# Patient Record
Sex: FEMALE | Race: BLACK OR AFRICAN AMERICAN | NOT HISPANIC OR LATINO
[De-identification: names, ages, dates, MRNs, and addresses within clinical notes are randomized per-mention and may not be internally consistent; named-entity substitution may affect disease eponyms.]

---

## 2017-02-28 ENCOUNTER — APPOINTMENT (OUTPATIENT)
Dept: CARDIOLOGY | Facility: CLINIC | Age: 72
End: 2017-02-28

## 2017-02-28 ENCOUNTER — NON-APPOINTMENT (OUTPATIENT)
Age: 72
End: 2017-02-28

## 2017-02-28 VITALS
SYSTOLIC BLOOD PRESSURE: 128 MMHG | DIASTOLIC BLOOD PRESSURE: 80 MMHG | BODY MASS INDEX: 34.23 KG/M2 | HEIGHT: 62 IN | HEART RATE: 64 BPM | WEIGHT: 186 LBS

## 2017-06-29 ENCOUNTER — APPOINTMENT (OUTPATIENT)
Dept: CARDIOLOGY | Facility: CLINIC | Age: 72
End: 2017-06-29
Payer: MEDICARE

## 2017-06-29 ENCOUNTER — NON-APPOINTMENT (OUTPATIENT)
Age: 72
End: 2017-06-29

## 2017-06-29 VITALS
BODY MASS INDEX: 34.57 KG/M2 | DIASTOLIC BLOOD PRESSURE: 80 MMHG | HEART RATE: 73 BPM | OXYGEN SATURATION: 99 % | WEIGHT: 189 LBS | SYSTOLIC BLOOD PRESSURE: 122 MMHG

## 2017-06-29 PROCEDURE — 93000 ELECTROCARDIOGRAM COMPLETE: CPT

## 2017-06-29 PROCEDURE — 99214 OFFICE O/P EST MOD 30 MIN: CPT

## 2017-06-29 PROCEDURE — 93306 TTE W/DOPPLER COMPLETE: CPT

## 2017-10-19 ENCOUNTER — OUTPATIENT (OUTPATIENT)
Dept: OUTPATIENT SERVICES | Facility: HOSPITAL | Age: 72
LOS: 1 days | End: 2017-10-19
Payer: COMMERCIAL

## 2017-10-19 VITALS
HEIGHT: 62 IN | RESPIRATION RATE: 17 BRPM | OXYGEN SATURATION: 98 % | DIASTOLIC BLOOD PRESSURE: 80 MMHG | WEIGHT: 186.07 LBS | TEMPERATURE: 98 F | SYSTOLIC BLOOD PRESSURE: 144 MMHG | HEART RATE: 80 BPM

## 2017-10-19 DIAGNOSIS — I10 ESSENTIAL (PRIMARY) HYPERTENSION: ICD-10-CM

## 2017-10-19 DIAGNOSIS — Z01.818 ENCOUNTER FOR OTHER PREPROCEDURAL EXAMINATION: ICD-10-CM

## 2017-10-19 DIAGNOSIS — E11.9 TYPE 2 DIABETES MELLITUS WITHOUT COMPLICATIONS: ICD-10-CM

## 2017-10-19 DIAGNOSIS — M75.122 COMPLETE ROTATOR CUFF TEAR OR RUPTURE OF LEFT SHOULDER, NOT SPECIFIED AS TRAUMATIC: ICD-10-CM

## 2017-10-19 DIAGNOSIS — M75.102 UNSPECIFIED ROTATOR CUFF TEAR OR RUPTURE OF LEFT SHOULDER, NOT SPECIFIED AS TRAUMATIC: ICD-10-CM

## 2017-10-19 DIAGNOSIS — Z90.710 ACQUIRED ABSENCE OF BOTH CERVIX AND UTERUS: Chronic | ICD-10-CM

## 2017-10-19 PROCEDURE — G0463: CPT

## 2017-10-19 RX ORDER — SODIUM CHLORIDE 9 MG/ML
3 INJECTION INTRAMUSCULAR; INTRAVENOUS; SUBCUTANEOUS EVERY 8 HOURS
Qty: 0 | Refills: 0 | Status: DISCONTINUED | OUTPATIENT
Start: 2017-10-31 | End: 2017-11-10

## 2017-10-19 NOTE — H&P PST ADULT - HISTORY OF PRESENT ILLNESS
72 yr old black female with history of HTN, DM, HLD, osteoporosis presents with complete tear of left rotator cuff. Pt states about 4 months ago she was unable to extend her left arm up and had pain. She received injection into the shoulder with some relief and than started with physical therapy but not too much improvement. Presents here for left shoulder arthroscopy and rotator cuff repair

## 2017-10-19 NOTE — H&P PST ADULT - NSANTHOSAYNRD_GEN_A_CORE
No. INDER screening performed.  STOP BANG Legend: 0-2 = LOW Risk; 3-4 = INTERMEDIATE Risk; 5-8 = HIGH Risk

## 2017-10-19 NOTE — H&P PST ADULT - PMH
DM (Diabetes Mellitus)    Generalized Osteoarthritis    Hypercholesteremia    Hypertension    Rotator cuff tear, non-traumatic, left

## 2017-10-19 NOTE — H&P PST ADULT - FAMILY HISTORY
Father  Still living? Unknown  History of hypertension, Age at diagnosis: Age Unknown  DM (diabetes mellitus), Age at diagnosis: Age Unknown     Mother  Still living? No  History of hypertension, Age at diagnosis: Age Unknown  DM (diabetes mellitus), Age at diagnosis: Age Unknown

## 2017-10-24 ENCOUNTER — APPOINTMENT (OUTPATIENT)
Dept: CARDIOLOGY | Facility: CLINIC | Age: 72
End: 2017-10-24
Payer: MEDICARE

## 2017-10-24 VITALS
HEIGHT: 62 IN | HEART RATE: 70 BPM | BODY MASS INDEX: 34.78 KG/M2 | WEIGHT: 189 LBS | SYSTOLIC BLOOD PRESSURE: 120 MMHG | DIASTOLIC BLOOD PRESSURE: 70 MMHG

## 2017-10-24 PROCEDURE — 99214 OFFICE O/P EST MOD 30 MIN: CPT

## 2017-10-31 ENCOUNTER — RESULT REVIEW (OUTPATIENT)
Age: 72
End: 2017-10-31

## 2017-10-31 ENCOUNTER — TRANSCRIPTION ENCOUNTER (OUTPATIENT)
Age: 72
End: 2017-10-31

## 2017-10-31 ENCOUNTER — OUTPATIENT (OUTPATIENT)
Dept: OUTPATIENT SERVICES | Facility: HOSPITAL | Age: 72
LOS: 1 days | Discharge: HOME | End: 2017-10-31
Payer: COMMERCIAL

## 2017-10-31 VITALS
SYSTOLIC BLOOD PRESSURE: 115 MMHG | TEMPERATURE: 98 F | RESPIRATION RATE: 16 BRPM | DIASTOLIC BLOOD PRESSURE: 57 MMHG | OXYGEN SATURATION: 100 % | HEART RATE: 74 BPM

## 2017-10-31 VITALS
TEMPERATURE: 98 F | HEART RATE: 65 BPM | RESPIRATION RATE: 14 BRPM | OXYGEN SATURATION: 100 % | DIASTOLIC BLOOD PRESSURE: 65 MMHG | WEIGHT: 186.07 LBS | SYSTOLIC BLOOD PRESSURE: 133 MMHG | HEIGHT: 62 IN

## 2017-10-31 DIAGNOSIS — Z90.710 ACQUIRED ABSENCE OF BOTH CERVIX AND UTERUS: Chronic | ICD-10-CM

## 2017-10-31 DIAGNOSIS — M75.122 COMPLETE ROTATOR CUFF TEAR OR RUPTURE OF LEFT SHOULDER, NOT SPECIFIED AS TRAUMATIC: ICD-10-CM

## 2017-10-31 LAB
GLUCOSE BLDC GLUCOMTR-MCNC: 139 MG/DL — HIGH (ref 70–99)
GLUCOSE BLDC GLUCOMTR-MCNC: 227 MG/DL — HIGH (ref 70–99)

## 2017-10-31 PROCEDURE — 29828 SHO ARTHRS SRG BICP TENODSIS: CPT | Mod: LT

## 2017-10-31 PROCEDURE — 29826 SHO ARTHRS SRG DECOMPRESSION: CPT | Mod: AS,59

## 2017-10-31 PROCEDURE — 88304 TISSUE EXAM BY PATHOLOGIST: CPT

## 2017-10-31 PROCEDURE — 29807 SHO ARTHRS SRG RPR SLAP LES: CPT | Mod: LT

## 2017-10-31 PROCEDURE — 29826 SHO ARTHRS SRG DECOMPRESSION: CPT | Mod: LT

## 2017-10-31 PROCEDURE — 23440 REMOVE/TRANSPLANT TENDON: CPT | Mod: AS,59

## 2017-10-31 PROCEDURE — 82962 GLUCOSE BLOOD TEST: CPT

## 2017-10-31 PROCEDURE — 29827 SHO ARTHRS SRG RT8TR CUF RPR: CPT | Mod: AS,LT

## 2017-10-31 PROCEDURE — 29824 SHO ARTHRS SRG DSTL CLAVICLC: CPT | Mod: AS,59

## 2017-10-31 PROCEDURE — 29823 SHO ARTHRS SRG XTNSV DBRDMT: CPT | Mod: AS,59

## 2017-10-31 PROCEDURE — 29824 SHO ARTHRS SRG DSTL CLAVICLC: CPT | Mod: LT

## 2017-10-31 PROCEDURE — 29827 SHO ARTHRS SRG RT8TR CUF RPR: CPT | Mod: LT

## 2017-10-31 PROCEDURE — C1713: CPT

## 2017-10-31 PROCEDURE — 88304 TISSUE EXAM BY PATHOLOGIST: CPT | Mod: 26

## 2017-10-31 RX ORDER — ONDANSETRON 8 MG/1
4 TABLET, FILM COATED ORAL EVERY 6 HOURS
Qty: 0 | Refills: 0 | Status: DISCONTINUED | OUTPATIENT
Start: 2017-10-31 | End: 2017-11-10

## 2017-10-31 RX ORDER — OXYCODONE AND ACETAMINOPHEN 5; 325 MG/1; MG/1
2 TABLET ORAL EVERY 6 HOURS
Qty: 0 | Refills: 0 | Status: DISCONTINUED | OUTPATIENT
Start: 2017-10-31 | End: 2017-10-31

## 2017-10-31 RX ORDER — OXYCODONE AND ACETAMINOPHEN 5; 325 MG/1; MG/1
1 TABLET ORAL EVERY 4 HOURS
Qty: 0 | Refills: 0 | Status: DISCONTINUED | OUTPATIENT
Start: 2017-10-31 | End: 2017-10-31

## 2017-10-31 RX ORDER — SODIUM CHLORIDE 9 MG/ML
1000 INJECTION, SOLUTION INTRAVENOUS
Qty: 0 | Refills: 0 | Status: DISCONTINUED | OUTPATIENT
Start: 2017-10-31 | End: 2017-11-10

## 2017-10-31 RX ORDER — HYDROMORPHONE HYDROCHLORIDE 2 MG/ML
0.3 INJECTION INTRAMUSCULAR; INTRAVENOUS; SUBCUTANEOUS
Qty: 0 | Refills: 0 | Status: DISCONTINUED | OUTPATIENT
Start: 2017-10-31 | End: 2017-10-31

## 2017-10-31 RX ADMIN — SODIUM CHLORIDE 3 MILLILITER(S): 9 INJECTION INTRAMUSCULAR; INTRAVENOUS; SUBCUTANEOUS at 07:29

## 2017-10-31 NOTE — ASU DISCHARGE PLAN (ADULT/PEDIATRIC). - NOTIFY
Unable to Urinate/Persistent Nausea and Vomiting/Pain not relieved by Medications/Swelling that continues/Bleeding that does not stop/Fever greater than 101/Numbness, tingling/Numbness, color, or temperature change to extremity

## 2017-10-31 NOTE — ASU DISCHARGE PLAN (ADULT/PEDIATRIC). - YOU WERE IN THE HOSPITAL FOR:
s/p left shoulder arthroscopy with PRP injection s/p left shoulder arthroscopy Rotator cuff repair, with PRP injection

## 2017-10-31 NOTE — BRIEF OPERATIVE NOTE - OPERATION/FINDINGS
PROCEDURE:  LEFT SHOULDER ARTHROSCOPIC RTC REPAIR, DEBRIDEMENT OF LABRUM,  SYNOVECTOMY, CHONDROPLASTY, SUBACROMIAL DECOMPRESSION, ACROMIOPLASTY  DISTAL CLAVICLE RESECTION, WITH INJECTION OF AUTOLOGOUS PRP INJECTION

## 2017-10-31 NOTE — ASU PREOP CHECKLIST - HEIGHT IN FEET
Spinal Block    Patient location during procedure: pre-op  Start Time: 4/14/2017 9:54 AM  Preanesthetic Checklist  Completed: patient identified, surgical consent, pre-op evaluation, timeout performed, IV checked, risks and benefits discussed and monitors and equipment checked  Spinal Block Prep:  Patient Position:left lateral decubitus  Sterile Tech:cap, gloves, mask and sterile barriers  Prep:Betadine  Patient Monitoring:blood pressure monitoring, continuous pulse oximetry and EKG  Spinal Block Procedure  Approach:midline  Guidance:landmark technique  Location:L2-L3  Needle Type:Pencan  Needle Gauge:27 G  Placement of Spinal needle event:cerebrospinal fluid aspirated  Paresthesia: no  Fluid Appearance:clear  Post Assessment  Patient Tolerance:patient tolerated the procedure well with no apparent complications  Complications no            
5

## 2017-10-31 NOTE — ASU DISCHARGE PLAN (ADULT/PEDIATRIC). - SPECIAL INSTRUCTIONS
Keep wound/bandage clean, dry and intact. Return to ER if Fever of 101 F or greater develops Keep wound/bandage clean, dry and intact. Return to ER if Fever of 101 F or greater develops    Please use ice packs for 30 minutes on then 30 minutes off as much as possible until post-operative appointment. Make sure to protect your skin by placing a towel between the ice pack and your skin.

## 2017-10-31 NOTE — ASU DISCHARGE PLAN (ADULT/PEDIATRIC). - MEDICATION SUMMARY - MEDICATIONS TO TAKE
I will START or STAY ON the medications listed below when I get home from the hospital:    Percocet 5/325 oral tablet  -- 1-2  tab(s) by mouth every 6 hours, As Needed -for moderate pain MDD:6   -- Caution federal law prohibits the transfer of this drug to any person other  than the person for whom it was prescribed.  May cause drowsiness.  Alcohol may intensify this effect.  Use care when operating dangerous machinery.  This prescription cannot be refilled.  This product contains acetaminophen.  Do not use  with any other product containing acetaminophen to prevent possible liver damage.  Using more of this medication than prescribed may cause serious breathing problems.    -- Indication: For pain    metFORMIN 1000 mg oral tablet  -- 1 tab(s) by mouth 2 times a day  -- Indication: For dm    Lantus 100 units/mL subcutaneous solution  -- 20 unit(s) subcutaneous once (at bedtime)  -- Indication: For dm    NovoLOG 100 units/mL subcutaneous solution  -- 15  subcutaneous 2 times a day  -- Indication: For dm    pravastatin 20 mg oral tablet  -- 1 tab(s) by mouth once a day  -- Indication: For hld    valsartan-hydrochlorothiazide 320mg-25mg oral tablet  -- 1 tab(s) by mouth once a day  -- Indication: For htn    raloxifene 60 mg oral tablet  -- 1 tab(s) by mouth once a day  -- Indication: For as per md    atenolol 50 mg oral tablet  -- 1 tab(s) by mouth once a day  -- Indication: For htn    timolol hemihydrate 0.5% ophthalmic solution  -- 1  to each affected eye once a day  -- Indication: For as per md

## 2017-10-31 NOTE — BRIEF OPERATIVE NOTE - PROCEDURE
<<-----Click on this checkbox to enter Procedure Rotator cuff repair  10/31/2017    Active  LILIANA  Shoulder arthroscopy  10/31/2017    Active  LILIANA

## 2017-11-03 LAB — SURGICAL PATHOLOGY FINAL REPORT - CH: SIGNIFICANT CHANGE UP

## 2017-11-08 DIAGNOSIS — S43.432A SUPERIOR GLENOID LABRUM LESION OF LEFT SHOULDER, INITIAL ENCOUNTER: ICD-10-CM
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
## 2017-11-08 DIAGNOSIS — M24.012 LOOSE BODY IN LEFT SHOULDER: ICD-10-CM
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
## 2017-11-08 DIAGNOSIS — M75.42 IMPINGEMENT SYNDROME OF LEFT SHOULDER: ICD-10-CM

## 2017-11-08 DIAGNOSIS — M75.102 UNSPECIFIED ROTATOR CUFF TEAR OR RUPTURE OF LEFT SHOULDER, NOT SPECIFIED AS TRAUMATIC: ICD-10-CM
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
## 2018-01-23 ENCOUNTER — APPOINTMENT (OUTPATIENT)
Dept: CARDIOLOGY | Facility: CLINIC | Age: 73
End: 2018-01-23
Payer: MEDICARE

## 2018-01-23 ENCOUNTER — NON-APPOINTMENT (OUTPATIENT)
Age: 73
End: 2018-01-23

## 2018-01-23 VITALS
OXYGEN SATURATION: 98 % | BODY MASS INDEX: 34.6 KG/M2 | HEART RATE: 82 BPM | SYSTOLIC BLOOD PRESSURE: 136 MMHG | DIASTOLIC BLOOD PRESSURE: 78 MMHG | WEIGHT: 188 LBS | HEIGHT: 62 IN | RESPIRATION RATE: 16 BRPM

## 2018-01-23 PROCEDURE — 99214 OFFICE O/P EST MOD 30 MIN: CPT

## 2018-01-23 PROCEDURE — 93000 ELECTROCARDIOGRAM COMPLETE: CPT

## 2018-04-18 ENCOUNTER — APPOINTMENT (OUTPATIENT)
Dept: CARDIOLOGY | Facility: CLINIC | Age: 73
End: 2018-04-18
Payer: MEDICARE

## 2018-04-18 VITALS
HEIGHT: 62 IN | DIASTOLIC BLOOD PRESSURE: 60 MMHG | BODY MASS INDEX: 34.41 KG/M2 | HEART RATE: 73 BPM | SYSTOLIC BLOOD PRESSURE: 102 MMHG | WEIGHT: 187 LBS | OXYGEN SATURATION: 98 %

## 2018-04-18 PROCEDURE — 99214 OFFICE O/P EST MOD 30 MIN: CPT

## 2018-06-05 ENCOUNTER — APPOINTMENT (OUTPATIENT)
Dept: CARDIOLOGY | Facility: CLINIC | Age: 73
End: 2018-06-05
Payer: MEDICARE

## 2018-06-05 ENCOUNTER — NON-APPOINTMENT (OUTPATIENT)
Age: 73
End: 2018-06-05

## 2018-06-05 VITALS
HEIGHT: 62 IN | HEART RATE: 81 BPM | BODY MASS INDEX: 34.96 KG/M2 | WEIGHT: 190 LBS | OXYGEN SATURATION: 99 % | DIASTOLIC BLOOD PRESSURE: 68 MMHG | SYSTOLIC BLOOD PRESSURE: 110 MMHG

## 2018-06-05 PROCEDURE — 93000 ELECTROCARDIOGRAM COMPLETE: CPT

## 2018-06-05 PROCEDURE — 99214 OFFICE O/P EST MOD 30 MIN: CPT

## 2018-06-21 ENCOUNTER — OUTPATIENT (OUTPATIENT)
Dept: OUTPATIENT SERVICES | Facility: HOSPITAL | Age: 73
LOS: 1 days | End: 2018-06-21
Payer: COMMERCIAL

## 2018-06-21 VITALS
TEMPERATURE: 98 F | WEIGHT: 190.04 LBS | DIASTOLIC BLOOD PRESSURE: 60 MMHG | RESPIRATION RATE: 18 BRPM | HEART RATE: 64 BPM | SYSTOLIC BLOOD PRESSURE: 120 MMHG | HEIGHT: 59 IN

## 2018-06-21 DIAGNOSIS — M75.122 COMPLETE ROTATOR CUFF TEAR OR RUPTURE OF LEFT SHOULDER, NOT SPECIFIED AS TRAUMATIC: ICD-10-CM

## 2018-06-21 DIAGNOSIS — Z01.818 ENCOUNTER FOR OTHER PREPROCEDURAL EXAMINATION: ICD-10-CM

## 2018-06-21 DIAGNOSIS — Z98.890 OTHER SPECIFIED POSTPROCEDURAL STATES: Chronic | ICD-10-CM

## 2018-06-21 DIAGNOSIS — M75.102 UNSPECIFIED ROTATOR CUFF TEAR OR RUPTURE OF LEFT SHOULDER, NOT SPECIFIED AS TRAUMATIC: ICD-10-CM

## 2018-06-21 DIAGNOSIS — Z90.710 ACQUIRED ABSENCE OF BOTH CERVIX AND UTERUS: Chronic | ICD-10-CM

## 2018-06-21 LAB
HBA1C BLD-MCNC: 7.5 % — HIGH (ref 4–5.6)
MRSA PCR RESULT.: SIGNIFICANT CHANGE UP
S AUREUS DNA NOSE QL NAA+PROBE: SIGNIFICANT CHANGE UP

## 2018-06-21 PROCEDURE — G0463: CPT

## 2018-06-21 PROCEDURE — 86901 BLOOD TYPING SEROLOGIC RH(D): CPT

## 2018-06-21 PROCEDURE — 87640 STAPH A DNA AMP PROBE: CPT

## 2018-06-21 PROCEDURE — 86850 RBC ANTIBODY SCREEN: CPT

## 2018-06-21 PROCEDURE — 83036 HEMOGLOBIN GLYCOSYLATED A1C: CPT

## 2018-06-21 PROCEDURE — 86900 BLOOD TYPING SEROLOGIC ABO: CPT

## 2018-06-21 PROCEDURE — 87641 MR-STAPH DNA AMP PROBE: CPT

## 2018-06-21 RX ORDER — GABAPENTIN 400 MG/1
100 CAPSULE ORAL ONCE
Qty: 0 | Refills: 0 | Status: COMPLETED | OUTPATIENT
Start: 2018-06-26 | End: 2018-06-26

## 2018-06-21 RX ORDER — RALOXIFENE HYDROCHLORIDE 60 MG/1
1 TABLET, COATED ORAL
Qty: 0 | Refills: 0 | COMMUNITY

## 2018-06-21 RX ORDER — TRAMADOL HYDROCHLORIDE 50 MG/1
50 TABLET ORAL ONCE
Qty: 0 | Refills: 0 | Status: DISCONTINUED | OUTPATIENT
Start: 2018-06-26 | End: 2018-06-26

## 2018-06-21 NOTE — H&P PST ADULT - ANESTHESIA, PREVIOUS REACTION, PROFILE
Left detailed message for pt that no referral is needed    She will need to check to be sure she can be seen by [facility]
Pt is HMO and GYN is in network but with Practice: [facility]   No referrals are needed if insurance approves  Pt last saw in 2015, don't know what insurance was at that time    Pt needs to call and ask insurance, otherwis
Reason for the order/referral: Dr. Brian Thorne   PCP: [email or web address] Dr. Kenrick Allen   Refer to Provider (first and last name): Dr. Josr Dinero   Patient Insurance: Payor: P [id number]/ADVCAP / Plan: [id number] / Product Type: HMO /   Duration/Summar
Shouldn't need a referral for routine gyne.
none

## 2018-06-21 NOTE — H&P PST ADULT - HISTORY OF PRESENT ILLNESS
Patient reports left shoulder pain increasing over the years interfering  with activities of daily living.

## 2018-06-21 NOTE — H&P PST ADULT - ASSESSMENT
73-year-old female with history of hypertension, hyperlipidemia, osteoarthritis, obstructive  sleep apnea, not using CPAP comes to PST with reports of increasing left shoulder pain.

## 2018-06-25 ENCOUNTER — TRANSCRIPTION ENCOUNTER (OUTPATIENT)
Age: 73
End: 2018-06-25

## 2018-06-26 ENCOUNTER — RESULT REVIEW (OUTPATIENT)
Age: 73
End: 2018-06-26

## 2018-06-26 ENCOUNTER — INPATIENT (INPATIENT)
Facility: HOSPITAL | Age: 73
LOS: 2 days | Discharge: HOME | DRG: 483 | End: 2018-06-29
Attending: ORTHOPAEDIC SURGERY | Admitting: ORTHOPAEDIC SURGERY
Payer: COMMERCIAL

## 2018-06-26 VITALS
TEMPERATURE: 98 F | WEIGHT: 190.04 LBS | HEART RATE: 70 BPM | SYSTOLIC BLOOD PRESSURE: 135 MMHG | HEIGHT: 59 IN | DIASTOLIC BLOOD PRESSURE: 76 MMHG | RESPIRATION RATE: 15 BRPM | OXYGEN SATURATION: 100 %

## 2018-06-26 DIAGNOSIS — Z01.818 ENCOUNTER FOR OTHER PREPROCEDURAL EXAMINATION: ICD-10-CM

## 2018-06-26 DIAGNOSIS — Z90.710 ACQUIRED ABSENCE OF BOTH CERVIX AND UTERUS: Chronic | ICD-10-CM

## 2018-06-26 DIAGNOSIS — Z98.890 OTHER SPECIFIED POSTPROCEDURAL STATES: Chronic | ICD-10-CM

## 2018-06-26 DIAGNOSIS — M75.122 COMPLETE ROTATOR CUFF TEAR OR RUPTURE OF LEFT SHOULDER, NOT SPECIFIED AS TRAUMATIC: ICD-10-CM

## 2018-06-26 LAB
ANION GAP SERPL CALC-SCNC: 8 MMOL/L — SIGNIFICANT CHANGE UP (ref 5–17)
BUN SERPL-MCNC: 15 MG/DL — SIGNIFICANT CHANGE UP (ref 7–18)
CALCIUM SERPL-MCNC: 8.9 MG/DL — SIGNIFICANT CHANGE UP (ref 8.4–10.5)
CHLORIDE SERPL-SCNC: 105 MMOL/L — SIGNIFICANT CHANGE UP (ref 96–108)
CO2 SERPL-SCNC: 27 MMOL/L — SIGNIFICANT CHANGE UP (ref 22–31)
CREAT SERPL-MCNC: 1.11 MG/DL — SIGNIFICANT CHANGE UP (ref 0.5–1.3)
GLUCOSE BLDC GLUCOMTR-MCNC: 131 MG/DL — HIGH (ref 70–99)
GLUCOSE BLDC GLUCOMTR-MCNC: 227 MG/DL — HIGH (ref 70–99)
GLUCOSE BLDC GLUCOMTR-MCNC: 237 MG/DL — HIGH (ref 70–99)
GLUCOSE BLDC GLUCOMTR-MCNC: 262 MG/DL — HIGH (ref 70–99)
GLUCOSE SERPL-MCNC: 231 MG/DL — HIGH (ref 70–99)
HCT VFR BLD CALC: 37 % — SIGNIFICANT CHANGE UP (ref 34.5–45)
HGB BLD-MCNC: 12.2 G/DL — SIGNIFICANT CHANGE UP (ref 11.5–15.5)
MCHC RBC-ENTMCNC: 30.4 PG — SIGNIFICANT CHANGE UP (ref 27–34)
MCHC RBC-ENTMCNC: 32.9 GM/DL — SIGNIFICANT CHANGE UP (ref 32–36)
MCV RBC AUTO: 92.5 FL — SIGNIFICANT CHANGE UP (ref 80–100)
PLATELET # BLD AUTO: 202 K/UL — SIGNIFICANT CHANGE UP (ref 150–400)
POTASSIUM SERPL-MCNC: 3.3 MMOL/L — LOW (ref 3.5–5.3)
POTASSIUM SERPL-SCNC: 3.3 MMOL/L — LOW (ref 3.5–5.3)
RBC # BLD: 4 M/UL — SIGNIFICANT CHANGE UP (ref 3.8–5.2)
RBC # FLD: 12.6 % — SIGNIFICANT CHANGE UP (ref 10.3–14.5)
SODIUM SERPL-SCNC: 140 MMOL/L — SIGNIFICANT CHANGE UP (ref 135–145)
WBC # BLD: 14.2 K/UL — HIGH (ref 3.8–10.5)
WBC # FLD AUTO: 14.2 K/UL — HIGH (ref 3.8–10.5)

## 2018-06-26 PROCEDURE — 73020 X-RAY EXAM OF SHOULDER: CPT | Mod: 26

## 2018-06-26 PROCEDURE — 88305 TISSUE EXAM BY PATHOLOGIST: CPT | Mod: 26

## 2018-06-26 PROCEDURE — 23412 REPAIR ROTATOR CUFF CHRONIC: CPT | Mod: AS,59,LT

## 2018-06-26 PROCEDURE — 88311 DECALCIFY TISSUE: CPT | Mod: 26

## 2018-06-26 PROCEDURE — 73030 X-RAY EXAM OF SHOULDER: CPT | Mod: 26,76,LT

## 2018-06-26 PROCEDURE — 23145 REMOVAL OF BONE LESION: CPT | Mod: AS,59,LT

## 2018-06-26 PROCEDURE — 23073 EXC SHOULDER TUM DEEP 5 CM/>: CPT | Mod: AS,59

## 2018-06-26 PROCEDURE — 23472 RECONSTRUCT SHOULDER JOINT: CPT | Mod: AS,LT

## 2018-06-26 RX ORDER — ATORVASTATIN CALCIUM 80 MG/1
10 TABLET, FILM COATED ORAL AT BEDTIME
Qty: 0 | Refills: 0 | Status: DISCONTINUED | OUTPATIENT
Start: 2018-06-26 | End: 2018-06-29

## 2018-06-26 RX ORDER — GLUCAGON INJECTION, SOLUTION 0.5 MG/.1ML
1 INJECTION, SOLUTION SUBCUTANEOUS ONCE
Qty: 0 | Refills: 0 | Status: DISCONTINUED | OUTPATIENT
Start: 2018-06-26 | End: 2018-06-29

## 2018-06-26 RX ORDER — DEXTROSE 50 % IN WATER 50 %
15 SYRINGE (ML) INTRAVENOUS ONCE
Qty: 0 | Refills: 0 | Status: DISCONTINUED | OUTPATIENT
Start: 2018-06-26 | End: 2018-06-29

## 2018-06-26 RX ORDER — ACETAMINOPHEN 500 MG
1000 TABLET ORAL ONCE
Qty: 0 | Refills: 0 | Status: COMPLETED | OUTPATIENT
Start: 2018-06-26 | End: 2018-06-26

## 2018-06-26 RX ORDER — ATENOLOL 25 MG/1
1 TABLET ORAL
Qty: 0 | Refills: 0 | COMMUNITY

## 2018-06-26 RX ORDER — DEXTROSE 50 % IN WATER 50 %
25 SYRINGE (ML) INTRAVENOUS ONCE
Qty: 0 | Refills: 0 | Status: DISCONTINUED | OUTPATIENT
Start: 2018-06-26 | End: 2018-06-29

## 2018-06-26 RX ORDER — AMLODIPINE BESYLATE 2.5 MG/1
1 TABLET ORAL
Qty: 0 | Refills: 0 | COMMUNITY

## 2018-06-26 RX ORDER — TIMOLOL 0.5 %
1 DROPS OPHTHALMIC (EYE) DAILY
Qty: 0 | Refills: 0 | Status: DISCONTINUED | OUTPATIENT
Start: 2018-06-26 | End: 2018-06-29

## 2018-06-26 RX ORDER — SODIUM CHLORIDE 9 MG/ML
1000 INJECTION, SOLUTION INTRAVENOUS
Qty: 0 | Refills: 0 | Status: DISCONTINUED | OUTPATIENT
Start: 2018-06-26 | End: 2018-06-29

## 2018-06-26 RX ORDER — FERROUS SULFATE 325(65) MG
325 TABLET ORAL
Qty: 0 | Refills: 0 | Status: DISCONTINUED | OUTPATIENT
Start: 2018-06-26 | End: 2018-06-29

## 2018-06-26 RX ORDER — RALOXIFENE HYDROCHLORIDE 60 MG/1
1 TABLET, COATED ORAL
Qty: 0 | Refills: 0 | COMMUNITY

## 2018-06-26 RX ORDER — INSULIN GLARGINE 100 [IU]/ML
20 INJECTION, SOLUTION SUBCUTANEOUS
Qty: 0 | Refills: 0 | COMMUNITY

## 2018-06-26 RX ORDER — CHOLECALCIFEROL (VITAMIN D3) 125 MCG
1 CAPSULE ORAL
Qty: 0 | Refills: 0 | COMMUNITY

## 2018-06-26 RX ORDER — ONDANSETRON 8 MG/1
4 TABLET, FILM COATED ORAL EVERY 6 HOURS
Qty: 0 | Refills: 0 | Status: DISCONTINUED | OUTPATIENT
Start: 2018-06-26 | End: 2018-06-29

## 2018-06-26 RX ORDER — ATENOLOL 25 MG/1
50 TABLET ORAL DAILY
Qty: 0 | Refills: 0 | Status: DISCONTINUED | OUTPATIENT
Start: 2018-06-26 | End: 2018-06-29

## 2018-06-26 RX ORDER — INSULIN GLARGINE 100 [IU]/ML
20 INJECTION, SOLUTION SUBCUTANEOUS AT BEDTIME
Qty: 0 | Refills: 0 | Status: DISCONTINUED | OUTPATIENT
Start: 2018-06-26 | End: 2018-06-28

## 2018-06-26 RX ORDER — BUPIVACAINE 13.3 MG/ML
20 INJECTION, SUSPENSION, LIPOSOMAL INFILTRATION ONCE
Qty: 0 | Refills: 0 | Status: DISCONTINUED | OUTPATIENT
Start: 2018-06-26 | End: 2018-06-26

## 2018-06-26 RX ORDER — POLYETHYLENE GLYCOL 3350 17 G/17G
17 POWDER, FOR SOLUTION ORAL DAILY
Qty: 0 | Refills: 0 | Status: DISCONTINUED | OUTPATIENT
Start: 2018-06-26 | End: 2018-06-29

## 2018-06-26 RX ORDER — FAMOTIDINE 10 MG/ML
20 INJECTION INTRAVENOUS EVERY 12 HOURS
Qty: 0 | Refills: 0 | Status: DISCONTINUED | OUTPATIENT
Start: 2018-06-26 | End: 2018-06-29

## 2018-06-26 RX ORDER — VALSARTAN 80 MG/1
320 TABLET ORAL DAILY
Qty: 0 | Refills: 0 | Status: DISCONTINUED | OUTPATIENT
Start: 2018-06-26 | End: 2018-06-29

## 2018-06-26 RX ORDER — OXYCODONE HYDROCHLORIDE 5 MG/1
5 TABLET ORAL EVERY 6 HOURS
Qty: 0 | Refills: 0 | Status: DISCONTINUED | OUTPATIENT
Start: 2018-06-26 | End: 2018-06-27

## 2018-06-26 RX ORDER — ASCORBIC ACID 60 MG
500 TABLET,CHEWABLE ORAL
Qty: 0 | Refills: 0 | Status: DISCONTINUED | OUTPATIENT
Start: 2018-06-26 | End: 2018-06-29

## 2018-06-26 RX ORDER — METFORMIN HYDROCHLORIDE 850 MG/1
1 TABLET ORAL
Qty: 0 | Refills: 0 | COMMUNITY

## 2018-06-26 RX ORDER — DOCUSATE SODIUM 100 MG
100 CAPSULE ORAL THREE TIMES A DAY
Qty: 0 | Refills: 0 | Status: DISCONTINUED | OUTPATIENT
Start: 2018-06-26 | End: 2018-06-29

## 2018-06-26 RX ORDER — AMLODIPINE BESYLATE 2.5 MG/1
5 TABLET ORAL DAILY
Qty: 0 | Refills: 0 | Status: DISCONTINUED | OUTPATIENT
Start: 2018-06-26 | End: 2018-06-29

## 2018-06-26 RX ORDER — TRANEXAMIC ACID 100 MG/ML
1000 INJECTION, SOLUTION INTRAVENOUS ONCE
Qty: 0 | Refills: 0 | Status: DISCONTINUED | OUTPATIENT
Start: 2018-06-26 | End: 2018-06-26

## 2018-06-26 RX ORDER — INSULIN LISPRO 100/ML
15 VIAL (ML) SUBCUTANEOUS
Qty: 0 | Refills: 0 | Status: DISCONTINUED | OUTPATIENT
Start: 2018-06-26 | End: 2018-06-27

## 2018-06-26 RX ORDER — RALOXIFENE HYDROCHLORIDE 60 MG/1
60 TABLET, COATED ORAL DAILY
Qty: 0 | Refills: 0 | Status: DISCONTINUED | OUTPATIENT
Start: 2018-06-29 | End: 2018-06-29

## 2018-06-26 RX ORDER — INSULIN LISPRO 100/ML
VIAL (ML) SUBCUTANEOUS
Qty: 0 | Refills: 0 | Status: DISCONTINUED | OUTPATIENT
Start: 2018-06-26 | End: 2018-06-29

## 2018-06-26 RX ORDER — SODIUM CHLORIDE 9 MG/ML
1000 INJECTION, SOLUTION INTRAVENOUS
Qty: 0 | Refills: 0 | Status: DISCONTINUED | OUTPATIENT
Start: 2018-06-26 | End: 2018-06-26

## 2018-06-26 RX ORDER — HYDROMORPHONE HYDROCHLORIDE 2 MG/ML
0.5 INJECTION INTRAMUSCULAR; INTRAVENOUS; SUBCUTANEOUS
Qty: 0 | Refills: 0 | Status: DISCONTINUED | OUTPATIENT
Start: 2018-06-26 | End: 2018-06-26

## 2018-06-26 RX ORDER — GABAPENTIN 400 MG/1
1 CAPSULE ORAL
Qty: 0 | Refills: 0 | COMMUNITY

## 2018-06-26 RX ORDER — ENOXAPARIN SODIUM 100 MG/ML
40 INJECTION SUBCUTANEOUS DAILY
Qty: 0 | Refills: 0 | Status: DISCONTINUED | OUTPATIENT
Start: 2018-06-27 | End: 2018-06-29

## 2018-06-26 RX ORDER — GABAPENTIN 400 MG/1
300 CAPSULE ORAL THREE TIMES A DAY
Qty: 0 | Refills: 0 | Status: DISCONTINUED | OUTPATIENT
Start: 2018-06-26 | End: 2018-06-29

## 2018-06-26 RX ORDER — TRAMADOL HYDROCHLORIDE 50 MG/1
50 TABLET ORAL EVERY 8 HOURS
Qty: 0 | Refills: 0 | Status: DISCONTINUED | OUTPATIENT
Start: 2018-06-26 | End: 2018-06-27

## 2018-06-26 RX ORDER — ACETAMINOPHEN 500 MG
1000 TABLET ORAL ONCE
Qty: 0 | Refills: 0 | Status: COMPLETED | OUTPATIENT
Start: 2018-06-27 | End: 2018-06-27

## 2018-06-26 RX ORDER — INSULIN ASPART 100 [IU]/ML
15 INJECTION, SOLUTION SUBCUTANEOUS
Qty: 0 | Refills: 0 | COMMUNITY

## 2018-06-26 RX ORDER — SODIUM CHLORIDE 9 MG/ML
1000 INJECTION INTRAMUSCULAR; INTRAVENOUS; SUBCUTANEOUS
Qty: 0 | Refills: 0 | Status: DISCONTINUED | OUTPATIENT
Start: 2018-06-26 | End: 2018-06-29

## 2018-06-26 RX ORDER — TIMOLOL 0.5 %
1 DROPS OPHTHALMIC (EYE) DAILY
Qty: 0 | Refills: 0 | Status: DISCONTINUED | OUTPATIENT
Start: 2018-06-26 | End: 2018-06-26

## 2018-06-26 RX ORDER — KETOROLAC TROMETHAMINE 30 MG/ML
15 SYRINGE (ML) INJECTION EVERY 6 HOURS
Qty: 0 | Refills: 0 | Status: DISCONTINUED | OUTPATIENT
Start: 2018-06-26 | End: 2018-06-28

## 2018-06-26 RX ORDER — SODIUM CHLORIDE 9 MG/ML
3 INJECTION INTRAMUSCULAR; INTRAVENOUS; SUBCUTANEOUS EVERY 8 HOURS
Qty: 0 | Refills: 0 | Status: DISCONTINUED | OUTPATIENT
Start: 2018-06-26 | End: 2018-06-26

## 2018-06-26 RX ORDER — CEFAZOLIN SODIUM 1 G
1000 VIAL (EA) INJECTION EVERY 8 HOURS
Qty: 0 | Refills: 0 | Status: COMPLETED | OUTPATIENT
Start: 2018-06-26 | End: 2018-06-27

## 2018-06-26 RX ORDER — HYDROCHLOROTHIAZIDE 25 MG
25 TABLET ORAL DAILY
Qty: 0 | Refills: 0 | Status: DISCONTINUED | OUTPATIENT
Start: 2018-06-26 | End: 2018-06-29

## 2018-06-26 RX ORDER — PREGABALIN 225 MG/1
2000 CAPSULE ORAL
Qty: 0 | Refills: 0 | COMMUNITY

## 2018-06-26 RX ORDER — ACETAMINOPHEN 500 MG
650 TABLET ORAL EVERY 6 HOURS
Qty: 0 | Refills: 0 | Status: DISCONTINUED | OUTPATIENT
Start: 2018-06-26 | End: 2018-06-29

## 2018-06-26 RX ORDER — SENNA PLUS 8.6 MG/1
2 TABLET ORAL AT BEDTIME
Qty: 0 | Refills: 0 | Status: DISCONTINUED | OUTPATIENT
Start: 2018-06-26 | End: 2018-06-29

## 2018-06-26 RX ORDER — ASPIRIN/CALCIUM CARB/MAGNESIUM 324 MG
81 TABLET ORAL DAILY
Qty: 0 | Refills: 0 | Status: DISCONTINUED | OUTPATIENT
Start: 2018-06-27 | End: 2018-06-29

## 2018-06-26 RX ORDER — MAGNESIUM HYDROXIDE 400 MG/1
30 TABLET, CHEWABLE ORAL DAILY
Qty: 0 | Refills: 0 | Status: DISCONTINUED | OUTPATIENT
Start: 2018-06-26 | End: 2018-06-29

## 2018-06-26 RX ORDER — GABAPENTIN 400 MG/1
100 CAPSULE ORAL EVERY 8 HOURS
Qty: 0 | Refills: 0 | Status: DISCONTINUED | OUTPATIENT
Start: 2018-06-26 | End: 2018-06-28

## 2018-06-26 RX ORDER — FOLIC ACID 0.8 MG
1 TABLET ORAL DAILY
Qty: 0 | Refills: 0 | Status: DISCONTINUED | OUTPATIENT
Start: 2018-06-26 | End: 2018-06-29

## 2018-06-26 RX ORDER — TIMOLOL 0.5 %
1 DROPS OPHTHALMIC (EYE)
Qty: 0 | Refills: 0 | COMMUNITY

## 2018-06-26 RX ADMIN — Medication 100 MILLIGRAM(S): at 22:15

## 2018-06-26 RX ADMIN — ONDANSETRON 4 MILLIGRAM(S): 8 TABLET, FILM COATED ORAL at 18:05

## 2018-06-26 RX ADMIN — GABAPENTIN 300 MILLIGRAM(S): 400 CAPSULE ORAL at 22:16

## 2018-06-26 RX ADMIN — Medication 15 UNIT(S): at 18:05

## 2018-06-26 RX ADMIN — FAMOTIDINE 20 MILLIGRAM(S): 10 INJECTION INTRAVENOUS at 22:15

## 2018-06-26 RX ADMIN — Medication 100 MILLIGRAM(S): at 22:55

## 2018-06-26 RX ADMIN — ATORVASTATIN CALCIUM 10 MILLIGRAM(S): 80 TABLET, FILM COATED ORAL at 22:15

## 2018-06-26 RX ADMIN — Medication 400 MILLIGRAM(S): at 22:14

## 2018-06-26 RX ADMIN — GABAPENTIN 100 MILLIGRAM(S): 400 CAPSULE ORAL at 22:15

## 2018-06-26 RX ADMIN — TRAMADOL HYDROCHLORIDE 50 MILLIGRAM(S): 50 TABLET ORAL at 22:15

## 2018-06-26 RX ADMIN — TRAMADOL HYDROCHLORIDE 50 MILLIGRAM(S): 50 TABLET ORAL at 08:18

## 2018-06-26 RX ADMIN — Medication 6: at 18:04

## 2018-06-26 RX ADMIN — Medication 1000 MILLIGRAM(S): at 16:34

## 2018-06-26 RX ADMIN — GABAPENTIN 100 MILLIGRAM(S): 400 CAPSULE ORAL at 08:18

## 2018-06-26 RX ADMIN — Medication 1000 MILLIGRAM(S): at 22:30

## 2018-06-26 RX ADMIN — Medication 500 MILLIGRAM(S): at 22:15

## 2018-06-26 RX ADMIN — Medication 400 MILLIGRAM(S): at 15:33

## 2018-06-26 RX ADMIN — INSULIN GLARGINE 20 UNIT(S): 100 INJECTION, SOLUTION SUBCUTANEOUS at 22:14

## 2018-06-26 RX ADMIN — TRAMADOL HYDROCHLORIDE 50 MILLIGRAM(S): 50 TABLET ORAL at 23:15

## 2018-06-27 DIAGNOSIS — M75.102 UNSPECIFIED ROTATOR CUFF TEAR OR RUPTURE OF LEFT SHOULDER, NOT SPECIFIED AS TRAUMATIC: ICD-10-CM

## 2018-06-27 DIAGNOSIS — Z96.612 PRESENCE OF LEFT ARTIFICIAL SHOULDER JOINT: ICD-10-CM

## 2018-06-27 DIAGNOSIS — M25.512 PAIN IN LEFT SHOULDER: ICD-10-CM

## 2018-06-27 DIAGNOSIS — I10 ESSENTIAL (PRIMARY) HYPERTENSION: ICD-10-CM

## 2018-06-27 DIAGNOSIS — E78.00 PURE HYPERCHOLESTEROLEMIA, UNSPECIFIED: ICD-10-CM

## 2018-06-27 DIAGNOSIS — E11.9 TYPE 2 DIABETES MELLITUS WITHOUT COMPLICATIONS: ICD-10-CM

## 2018-06-27 LAB
ANION GAP SERPL CALC-SCNC: 9 MMOL/L — SIGNIFICANT CHANGE UP (ref 5–17)
BUN SERPL-MCNC: 15 MG/DL — SIGNIFICANT CHANGE UP (ref 7–18)
CALCIUM SERPL-MCNC: 8.4 MG/DL — SIGNIFICANT CHANGE UP (ref 8.4–10.5)
CHLORIDE SERPL-SCNC: 103 MMOL/L — SIGNIFICANT CHANGE UP (ref 96–108)
CO2 SERPL-SCNC: 27 MMOL/L — SIGNIFICANT CHANGE UP (ref 22–31)
CREAT SERPL-MCNC: 1.04 MG/DL — SIGNIFICANT CHANGE UP (ref 0.5–1.3)
GLUCOSE BLDC GLUCOMTR-MCNC: 144 MG/DL — HIGH (ref 70–99)
GLUCOSE BLDC GLUCOMTR-MCNC: 180 MG/DL — HIGH (ref 70–99)
GLUCOSE BLDC GLUCOMTR-MCNC: 261 MG/DL — HIGH (ref 70–99)
GLUCOSE BLDC GLUCOMTR-MCNC: 294 MG/DL — HIGH (ref 70–99)
GLUCOSE SERPL-MCNC: 255 MG/DL — HIGH (ref 70–99)
HCT VFR BLD CALC: 32.7 % — LOW (ref 34.5–45)
HGB BLD-MCNC: 11 G/DL — LOW (ref 11.5–15.5)
MCHC RBC-ENTMCNC: 30.8 PG — SIGNIFICANT CHANGE UP (ref 27–34)
MCHC RBC-ENTMCNC: 33.6 GM/DL — SIGNIFICANT CHANGE UP (ref 32–36)
MCV RBC AUTO: 91.8 FL — SIGNIFICANT CHANGE UP (ref 80–100)
PLATELET # BLD AUTO: 172 K/UL — SIGNIFICANT CHANGE UP (ref 150–400)
POTASSIUM SERPL-MCNC: 3.7 MMOL/L — SIGNIFICANT CHANGE UP (ref 3.5–5.3)
POTASSIUM SERPL-SCNC: 3.7 MMOL/L — SIGNIFICANT CHANGE UP (ref 3.5–5.3)
RBC # BLD: 3.56 M/UL — LOW (ref 3.8–5.2)
RBC # FLD: 12.7 % — SIGNIFICANT CHANGE UP (ref 10.3–14.5)
SODIUM SERPL-SCNC: 139 MMOL/L — SIGNIFICANT CHANGE UP (ref 135–145)
WBC # BLD: 12 K/UL — HIGH (ref 3.8–10.5)
WBC # FLD AUTO: 12 K/UL — HIGH (ref 3.8–10.5)

## 2018-06-27 PROCEDURE — 99223 1ST HOSP IP/OBS HIGH 75: CPT

## 2018-06-27 RX ORDER — INSULIN LISPRO 100/ML
15 VIAL (ML) SUBCUTANEOUS
Qty: 0 | Refills: 0 | Status: DISCONTINUED | OUTPATIENT
Start: 2018-06-27 | End: 2018-06-29

## 2018-06-27 RX ORDER — OXYCODONE HYDROCHLORIDE 5 MG/1
10 TABLET ORAL EVERY 4 HOURS
Qty: 0 | Refills: 0 | Status: DISCONTINUED | OUTPATIENT
Start: 2018-06-27 | End: 2018-06-28

## 2018-06-27 RX ADMIN — Medication 500 MILLIGRAM(S): at 06:05

## 2018-06-27 RX ADMIN — OXYCODONE HYDROCHLORIDE 10 MILLIGRAM(S): 5 TABLET ORAL at 13:30

## 2018-06-27 RX ADMIN — Medication 2: at 17:25

## 2018-06-27 RX ADMIN — Medication 1000 MILLIGRAM(S): at 09:38

## 2018-06-27 RX ADMIN — AMLODIPINE BESYLATE 5 MILLIGRAM(S): 2.5 TABLET ORAL at 05:39

## 2018-06-27 RX ADMIN — Medication 100 MILLIGRAM(S): at 15:01

## 2018-06-27 RX ADMIN — Medication 100 MILLIGRAM(S): at 22:31

## 2018-06-27 RX ADMIN — Medication 400 MILLIGRAM(S): at 08:25

## 2018-06-27 RX ADMIN — Medication 81 MILLIGRAM(S): at 12:37

## 2018-06-27 RX ADMIN — SODIUM CHLORIDE 126 MILLILITER(S): 9 INJECTION INTRAMUSCULAR; INTRAVENOUS; SUBCUTANEOUS at 07:58

## 2018-06-27 RX ADMIN — Medication 15 UNIT(S): at 12:37

## 2018-06-27 RX ADMIN — ENOXAPARIN SODIUM 40 MILLIGRAM(S): 100 INJECTION SUBCUTANEOUS at 12:37

## 2018-06-27 RX ADMIN — Medication 325 MILLIGRAM(S): at 12:37

## 2018-06-27 RX ADMIN — Medication 15 UNIT(S): at 07:57

## 2018-06-27 RX ADMIN — OXYCODONE HYDROCHLORIDE 10 MILLIGRAM(S): 5 TABLET ORAL at 12:44

## 2018-06-27 RX ADMIN — Medication 15 UNIT(S): at 17:26

## 2018-06-27 RX ADMIN — Medication 325 MILLIGRAM(S): at 17:26

## 2018-06-27 RX ADMIN — Medication 500 MILLIGRAM(S): at 17:26

## 2018-06-27 RX ADMIN — Medication 6: at 07:57

## 2018-06-27 RX ADMIN — FAMOTIDINE 20 MILLIGRAM(S): 10 INJECTION INTRAVENOUS at 17:35

## 2018-06-27 RX ADMIN — Medication 100 MILLIGRAM(S): at 05:40

## 2018-06-27 RX ADMIN — Medication 325 MILLIGRAM(S): at 07:57

## 2018-06-27 RX ADMIN — GABAPENTIN 100 MILLIGRAM(S): 400 CAPSULE ORAL at 05:40

## 2018-06-27 RX ADMIN — Medication 25 MILLIGRAM(S): at 05:40

## 2018-06-27 RX ADMIN — Medication 1 TABLET(S): at 12:37

## 2018-06-27 RX ADMIN — GABAPENTIN 300 MILLIGRAM(S): 400 CAPSULE ORAL at 05:39

## 2018-06-27 RX ADMIN — VALSARTAN 320 MILLIGRAM(S): 80 TABLET ORAL at 05:40

## 2018-06-27 RX ADMIN — ATENOLOL 50 MILLIGRAM(S): 25 TABLET ORAL at 05:40

## 2018-06-27 RX ADMIN — ONDANSETRON 4 MILLIGRAM(S): 8 TABLET, FILM COATED ORAL at 09:38

## 2018-06-27 RX ADMIN — Medication 6: at 12:37

## 2018-06-27 RX ADMIN — FAMOTIDINE 20 MILLIGRAM(S): 10 INJECTION INTRAVENOUS at 05:40

## 2018-06-27 RX ADMIN — TRAMADOL HYDROCHLORIDE 50 MILLIGRAM(S): 50 TABLET ORAL at 05:39

## 2018-06-27 RX ADMIN — ATORVASTATIN CALCIUM 10 MILLIGRAM(S): 80 TABLET, FILM COATED ORAL at 22:31

## 2018-06-27 RX ADMIN — POLYETHYLENE GLYCOL 3350 17 GRAM(S): 17 POWDER, FOR SOLUTION ORAL at 12:38

## 2018-06-27 RX ADMIN — Medication 1 MILLIGRAM(S): at 12:37

## 2018-06-27 NOTE — CONSULT NOTE ADULT - SUBJECTIVE AND OBJECTIVE BOX
LUIS MANUELJANEMACK  73y  Female      Patient is a 73y old  Female who presents with a chief complaint of left shoulder pain. Pt is s/p left shoulder total replacement, pod#1.  Pt lying in bed, complaining of left shoulder pain.  Pain is 8/10.  Pain radiates down left arm.  No chest pain or sob.        PAST MEDICAL/SURGICAL HISTORY  PAST MEDICAL & SURGICAL HISTORY:  Rotator cuff tear, non-traumatic, left  Generalized Osteoarthritis  DM (Diabetes Mellitus)  Hypertension  Hypercholesteremia  History of arthroplasty of left shoulder  H/O total hysterectomy  S/P Cholecystectomy      REVIEW OF SYSTEMS:  CONSTITUTIONAL: No fever, weight loss, or fatigue  EYES: No eye pain, visual disturbances, or discharge  ENMT:  No difficulty hearing, tinnitus, vertigo; No sinus or throat pain  NECK: No pain or stiffness  BREASTS: No pain, masses, or nipple discharge  RESPIRATORY: No cough, wheezing, chills or hemoptysis; No shortness of breath  CARDIOVASCULAR: No chest pain, palpitations, dizziness, or leg swelling  GASTROINTESTINAL: No abdominal or epigastric pain. No nausea, vomiting, or hematemesis; No diarrhea or constipation. No melena or hematochezia.  GENITOURINARY: No dysuria, frequency, hematuria, or incontinence  NEUROLOGICAL: No headaches, memory loss, loss of strength, numbness, or tremors  SKIN: No itching, burning, rashes, or lesions   LYMPH NODES: No enlarged glands  MUSCULOSKELETAL: +left shoulder pain, decreased rom    T(C): 36.9 (06-27-18 @ 04:48), Max: 37.1 (06-27-18 @ 00:06)  HR: 85 (06-27-18 @ 04:48) (67 - 97)  BP: 125/57 (06-27-18 @ 04:48) (117/71 - 142/65)  RR: 16 (06-27-18 @ 04:48) (11 - 19)  SpO2: 100% (06-27-18 @ 04:48) (94% - 100%)  Wt(kg): --Vital Signs Last 24 Hrs  T(C): 36.9 (27 Jun 2018 04:48), Max: 37.1 (27 Jun 2018 00:06)  T(F): 98.5 (27 Jun 2018 04:48), Max: 98.8 (27 Jun 2018 00:06)  HR: 85 (27 Jun 2018 04:48) (67 - 97)  BP: 125/57 (27 Jun 2018 04:48) (117/71 - 142/65)  BP(mean): 86 (26 Jun 2018 16:30) (86 - 91)  RR: 16 (27 Jun 2018 04:48) (11 - 19)  SpO2: 100% (27 Jun 2018 04:48) (94% - 100%)    PHYSICAL EXAM:  GENERAL: NAD, well-groomed, well-developed  HEAD:  Atraumatic, Normocephalic  NECK: Supple, No JVD, Normal thyroid  NERVOUS SYSTEM:  Alert & Oriented X3, Good concentration  CHEST/LUNG: Clear to percussion bilaterally; No rales, rhonchi, wheezing, or rubs  HEART: Regular rate and rhythm; No murmurs, rubs, or gallops  ABDOMEN: Soft, Nontender, Nondistended; Bowel sounds present  EXTREMITIES:  2+ Peripheral Pulses, No clubbing, cyanosis, or edema  SKIN: No rashes or lesions  MUSCULOSKELETAL: + left shoulder pain, decreased rom    Consultant(s) Notes Reviewed:  [x ] YES  [ ] NO  Care Discussed with Consultants/Other Providers [ x] YES  [ ] NO    LABS:  CBC   06-27-18 @ 08:21  Hematcorit 32.7  Hemoglobin 11.0  Mean Cell Hemoglobin 30.8  Platelet Count-Automated 172  RBC Count 3.56  Red Cell Distrib Width 12.7  Wbc Count 12.0  06-26-18 @ 16:09  Hematcorit 37.0  Hemoglobin 12.2  Mean Cell Hemoglobin 30.4  Platelet Count-Automated 202  RBC Count 4.00  Red Cell Distrib Width 12.6  Wbc Count 14.2      BMP  06-27-18 @ 08:21  Anion Gap. Serum 9  Blood Urea Nitrogen,Serm 15  Calcium, Total Serum 8.4  Carbon Dioxide, Serum 27  Chloride, Serum 103  Creatinine, Serum 1.04  eGFR in  61  eGFR in Non Afican American 53  Gloucose, serum 255  Potassium, Serum 3.7  Sodium, Serum 139              06-26-18 @ 16:09  Anion Gap. Serum 8  Blood Urea Nitrogen,Serm 15  Calcium, Total Serum 8.9  Carbon Dioxide, Serum 27  Chloride, Serum 105  Creatinine, Serum 1.11  eGFR in  56  eGFR in Non Afican American 49  Gloucose, serum 231  Potassium, Serum 3.3  Sodium, Serum 140                  CMP  06-27-18 @ 08:21  Elsa Aminotransferase(ALT/SGPT)--  Albumin, Serum --  Alkaline Phosphatase, Serum --  Anion Gap, Serum 9  Aspartate Aminotransferase (AST/SGOT)--  Bilirubin Total, Serum --  Blood Urea Nitrogen, Serum 15  Calcium,Total Serum 8.4  Carbon Dioxide, Serum 27  Chloride, Serum 103  Creatinine, Serum 1.04  eGFR if  61  eGFR if Non African American 53  Glucose, Serum 255  Potassium, Serum 3.7  Protein Total, Serum --  Sodium, Serum 139                      06-26-18 @ 16:09  Elsa Aminotransferase(ALT/SGPT)--  Albumin, Serum --  Alkaline Phosphatase, Serum --  Anion Gap, Serum 8  Aspartate Aminotransferase (AST/SGOT)--  Bilirubin Total, Serum --  Blood Urea Nitrogen, Serum 15  Calcium,Total Serum 8.9  Carbon Dioxide, Serum 27  Chloride, Serum 105  Creatinine, Serum 1.11  eGFR if  56  eGFR if Non African American 49  Glucose, Serum 231  Potassium, Serum 3.3  Protein Total, Serum --  Sodium, Serum 140                          PT/INR      Amylase/Lipase            RADIOLOGY & ADDITIONAL TESTS:    Imaging Personally Reviewed:  [ ] YES  [ ] NO

## 2018-06-27 NOTE — PHYSICAL THERAPY INITIAL EVALUATION ADULT - RANGE OF MOTION EXAMINATION, REHAB EVAL
no ROM deficits were identified/no ROM on left shoulder; keep NWB and inside arm sling/deficits as listed below

## 2018-06-27 NOTE — PHYSICAL THERAPY INITIAL EVALUATION ADULT - PASSIVE RANGE OF MOTION EXAMINATION, REHAB EVAL
no Passive ROM deficits were identified/deficits as listed below/no ROM on left shoulder; keep NWB and inside arm sling

## 2018-06-27 NOTE — PROGRESS NOTE ADULT - SUBJECTIVE AND OBJECTIVE BOX
73yFemale    Diagnosis:  S/p L Reverse Total Shoulder Replacement POD# 1    Patient was seen and evaluated at bedside. Patient with no overnight events or acute complaints.   Pain is controlled.   Denies CP/SOB, dyspnea, N/V/D, palpitations. Pt c/o intermittent tingling to L fingers although states this is her baseline due to a hx of cervical disc herniation.     Vital Signs Last 24 Hrs  T(C): 36.9 (27 Jun 2018 04:48), Max: 37.1 (27 Jun 2018 00:06)  T(F): 98.5 (27 Jun 2018 04:48), Max: 98.8 (27 Jun 2018 00:06)  HR: 85 (27 Jun 2018 04:48) (67 - 97)  BP: 125/57 (27 Jun 2018 04:48) (117/71 - 142/65)  BP(mean): 86 (26 Jun 2018 16:30) (86 - 91)  RR: 16 (27 Jun 2018 04:48) (11 - 19)  SpO2: 100% (27 Jun 2018 04:48) (94% - 100%)  I&O's Detail    26 Jun 2018 07:01  -  27 Jun 2018 07:00  --------------------------------------------------------  IN:    Lactated Ringers IV Bolus: 2000 mL  Total IN: 2000 mL    OUT:  Total OUT: 0 mL    Total NET: 2000 mL    Physical Exam:    General: AAOx3, NAD, resting comfortably in bed.    L Shoulder:  Sling intact- Dressing is C/D/I. Skin is pink and warm. No erythema. SILT.    Upper extremity:  Compartments soft and NT in forearm B/L. 2+pulses. NVI. 4/5  strength to LUE.  Good capillary refill. (+) AIN/PIN/M/R/U intact. SILT to all fingers and arm. Moves all fingers.                           11.0   12.0  )-----------( 172      ( 27 Jun 2018 08:21 )             32.7     06-27    139  |  103  |  15  ----------------------------<  255<H>  3.7   |  27  |  1.04    Ca    8.4      27 Jun 2018 08:21        Impression:  73yFemale S/p L Reverse Total Shoulder Replacement POD# 1  Plan:  -  Pain management  -  Dvt prophylaxis  -  Daily Physical Therapy:  NWB to LUE   -  Keep sling intact to LUE at all times  -  Discharge planning: Home Vs. Rehab pending Physical therapy eval.  -  Incentive spirometer  -  Continue with Post-op Antibiotics x 24hrs  -  Case d/w DR. Bryant

## 2018-06-27 NOTE — PHYSICAL THERAPY INITIAL EVALUATION ADULT - DISCHARGE DISPOSITION, PT EVAL
home/outpatient PT/follow-up with Dr. Bryant in clinic; begin outpatient PT once cleared by orthopedic surgeon

## 2018-06-28 ENCOUNTER — TRANSCRIPTION ENCOUNTER (OUTPATIENT)
Age: 73
End: 2018-06-28

## 2018-06-28 LAB
ANION GAP SERPL CALC-SCNC: 7 MMOL/L — SIGNIFICANT CHANGE UP (ref 5–17)
BUN SERPL-MCNC: 18 MG/DL — SIGNIFICANT CHANGE UP (ref 7–18)
CALCIUM SERPL-MCNC: 8.6 MG/DL — SIGNIFICANT CHANGE UP (ref 8.4–10.5)
CHLORIDE SERPL-SCNC: 100 MMOL/L — SIGNIFICANT CHANGE UP (ref 96–108)
CO2 SERPL-SCNC: 32 MMOL/L — HIGH (ref 22–31)
CREAT SERPL-MCNC: 1.48 MG/DL — HIGH (ref 0.5–1.3)
GLUCOSE BLDC GLUCOMTR-MCNC: 133 MG/DL — HIGH (ref 70–99)
GLUCOSE BLDC GLUCOMTR-MCNC: 229 MG/DL — HIGH (ref 70–99)
GLUCOSE BLDC GLUCOMTR-MCNC: 295 MG/DL — HIGH (ref 70–99)
GLUCOSE BLDC GLUCOMTR-MCNC: 370 MG/DL — HIGH (ref 70–99)
GLUCOSE SERPL-MCNC: 335 MG/DL — HIGH (ref 70–99)
HCT VFR BLD CALC: 32.7 % — LOW (ref 34.5–45)
HGB BLD-MCNC: 10.8 G/DL — LOW (ref 11.5–15.5)
MCHC RBC-ENTMCNC: 30.4 PG — SIGNIFICANT CHANGE UP (ref 27–34)
MCHC RBC-ENTMCNC: 33.1 GM/DL — SIGNIFICANT CHANGE UP (ref 32–36)
MCV RBC AUTO: 92 FL — SIGNIFICANT CHANGE UP (ref 80–100)
PLATELET # BLD AUTO: 181 K/UL — SIGNIFICANT CHANGE UP (ref 150–400)
POTASSIUM SERPL-MCNC: 3.5 MMOL/L — SIGNIFICANT CHANGE UP (ref 3.5–5.3)
POTASSIUM SERPL-SCNC: 3.5 MMOL/L — SIGNIFICANT CHANGE UP (ref 3.5–5.3)
RBC # BLD: 3.56 M/UL — LOW (ref 3.8–5.2)
RBC # FLD: 13 % — SIGNIFICANT CHANGE UP (ref 10.3–14.5)
SODIUM SERPL-SCNC: 139 MMOL/L — SIGNIFICANT CHANGE UP (ref 135–145)
WBC # BLD: 12.3 K/UL — HIGH (ref 3.8–10.5)
WBC # FLD AUTO: 12.3 K/UL — HIGH (ref 3.8–10.5)

## 2018-06-28 PROCEDURE — 99233 SBSQ HOSP IP/OBS HIGH 50: CPT

## 2018-06-28 RX ORDER — INSULIN GLARGINE 100 [IU]/ML
10 INJECTION, SOLUTION SUBCUTANEOUS AT BEDTIME
Qty: 0 | Refills: 0 | Status: DISCONTINUED | OUTPATIENT
Start: 2018-06-28 | End: 2018-06-28

## 2018-06-28 RX ORDER — ASPIRIN/CALCIUM CARB/MAGNESIUM 324 MG
1 TABLET ORAL
Qty: 0 | Refills: 0 | COMMUNITY

## 2018-06-28 RX ORDER — INSULIN GLARGINE 100 [IU]/ML
10 INJECTION, SOLUTION SUBCUTANEOUS AT BEDTIME
Qty: 0 | Refills: 0 | Status: DISCONTINUED | OUTPATIENT
Start: 2018-06-28 | End: 2018-06-29

## 2018-06-28 RX ORDER — FERROUS SULFATE 325(65) MG
1 TABLET ORAL
Qty: 25 | Refills: 0 | OUTPATIENT
Start: 2018-06-28 | End: 2018-07-20

## 2018-06-28 RX ORDER — FERROUS SULFATE 325(65) MG
1 TABLET ORAL
Qty: 30 | Refills: 0 | OUTPATIENT
Start: 2018-06-28

## 2018-06-28 RX ORDER — SODIUM CHLORIDE 9 MG/ML
1000 INJECTION INTRAMUSCULAR; INTRAVENOUS; SUBCUTANEOUS
Qty: 0 | Refills: 0 | Status: DISCONTINUED | OUTPATIENT
Start: 2018-06-28 | End: 2018-06-29

## 2018-06-28 RX ORDER — ASPIRIN/CALCIUM CARB/MAGNESIUM 324 MG
1 TABLET ORAL
Qty: 60 | Refills: 0 | OUTPATIENT
Start: 2018-06-28 | End: 2018-07-27

## 2018-06-28 RX ORDER — TIZANIDINE 4 MG/1
2 TABLET ORAL
Qty: 0 | Refills: 0 | COMMUNITY

## 2018-06-28 RX ORDER — DOCUSATE SODIUM 100 MG
1 CAPSULE ORAL
Qty: 30 | Refills: 0 | OUTPATIENT
Start: 2018-06-28

## 2018-06-28 RX ORDER — TRAMADOL HYDROCHLORIDE 50 MG/1
1 TABLET ORAL
Qty: 40 | Refills: 0 | OUTPATIENT
Start: 2018-06-28

## 2018-06-28 RX ORDER — DOCUSATE SODIUM 100 MG
1 CAPSULE ORAL
Qty: 32 | Refills: 0 | OUTPATIENT
Start: 2018-06-28 | End: 2018-07-13

## 2018-06-28 RX ORDER — TRAMADOL HYDROCHLORIDE 50 MG/1
50 TABLET ORAL EVERY 6 HOURS
Qty: 0 | Refills: 0 | Status: DISCONTINUED | OUTPATIENT
Start: 2018-06-28 | End: 2018-06-29

## 2018-06-28 RX ADMIN — FAMOTIDINE 20 MILLIGRAM(S): 10 INJECTION INTRAVENOUS at 05:23

## 2018-06-28 RX ADMIN — Medication 25 MILLIGRAM(S): at 05:22

## 2018-06-28 RX ADMIN — Medication 100 MILLIGRAM(S): at 05:23

## 2018-06-28 RX ADMIN — Medication 325 MILLIGRAM(S): at 17:21

## 2018-06-28 RX ADMIN — FAMOTIDINE 20 MILLIGRAM(S): 10 INJECTION INTRAVENOUS at 17:22

## 2018-06-28 RX ADMIN — SODIUM CHLORIDE 75 MILLILITER(S): 9 INJECTION INTRAMUSCULAR; INTRAVENOUS; SUBCUTANEOUS at 22:38

## 2018-06-28 RX ADMIN — ENOXAPARIN SODIUM 40 MILLIGRAM(S): 100 INJECTION SUBCUTANEOUS at 12:03

## 2018-06-28 RX ADMIN — INSULIN GLARGINE 10 UNIT(S): 100 INJECTION, SOLUTION SUBCUTANEOUS at 22:34

## 2018-06-28 RX ADMIN — GABAPENTIN 300 MILLIGRAM(S): 400 CAPSULE ORAL at 14:33

## 2018-06-28 RX ADMIN — Medication 10: at 12:01

## 2018-06-28 RX ADMIN — Medication 15 MILLIGRAM(S): at 08:35

## 2018-06-28 RX ADMIN — Medication 500 MILLIGRAM(S): at 05:23

## 2018-06-28 RX ADMIN — ATORVASTATIN CALCIUM 10 MILLIGRAM(S): 80 TABLET, FILM COATED ORAL at 22:34

## 2018-06-28 RX ADMIN — Medication 500 MILLIGRAM(S): at 17:19

## 2018-06-28 RX ADMIN — Medication 81 MILLIGRAM(S): at 12:02

## 2018-06-28 RX ADMIN — GABAPENTIN 300 MILLIGRAM(S): 400 CAPSULE ORAL at 22:35

## 2018-06-28 RX ADMIN — Medication 100 MILLIGRAM(S): at 14:33

## 2018-06-28 RX ADMIN — Medication 325 MILLIGRAM(S): at 12:03

## 2018-06-28 RX ADMIN — Medication 1 MILLIGRAM(S): at 12:02

## 2018-06-28 RX ADMIN — Medication 1 DROP(S): at 12:05

## 2018-06-28 RX ADMIN — Medication 1 TABLET(S): at 12:03

## 2018-06-28 RX ADMIN — Medication 15 MILLIGRAM(S): at 08:20

## 2018-06-28 RX ADMIN — Medication 6: at 08:08

## 2018-06-28 RX ADMIN — POLYETHYLENE GLYCOL 3350 17 GRAM(S): 17 POWDER, FOR SOLUTION ORAL at 12:04

## 2018-06-28 RX ADMIN — Medication 15 UNIT(S): at 08:09

## 2018-06-28 RX ADMIN — ATENOLOL 50 MILLIGRAM(S): 25 TABLET ORAL at 05:23

## 2018-06-28 RX ADMIN — Medication 15 UNIT(S): at 12:01

## 2018-06-28 RX ADMIN — Medication 100 MILLIGRAM(S): at 22:34

## 2018-06-28 RX ADMIN — AMLODIPINE BESYLATE 5 MILLIGRAM(S): 2.5 TABLET ORAL at 05:23

## 2018-06-28 RX ADMIN — Medication 325 MILLIGRAM(S): at 08:09

## 2018-06-28 RX ADMIN — VALSARTAN 320 MILLIGRAM(S): 80 TABLET ORAL at 05:23

## 2018-06-28 RX ADMIN — SODIUM CHLORIDE 126 MILLILITER(S): 9 INJECTION INTRAMUSCULAR; INTRAVENOUS; SUBCUTANEOUS at 17:25

## 2018-06-28 NOTE — PROGRESS NOTE ADULT - PROBLEM SELECTOR PLAN 1
- dc toradol  - tramadol 50mg po q 6 hours prn  - dc oxycodone  - gabapentin 300mg po q 8 - pt on this at home  - stool softeners
73yFemale S/p L Reverse Total Shoulder Replacement POD# 1  - Pain control  - DVT ppx  - NWB to LUE  - Sling to LUE at all times  - C/w 24 Hr IV antibx postop

## 2018-06-28 NOTE — PROGRESS NOTE ADULT - SUBJECTIVE AND OBJECTIVE BOX
NP Note discussed with  Primary Attending    Patient is a 73y old  Female who presents with a chief complaint of S/p Reverse Lt total shoulder replacement (28 Jun 2018 11:40).  Pt s/p reverse Left tsr, pod#2.  Pt complaining of left shoulder pain.  Pain radiates down left arm.  Pt states that oxycodone makes her nauseous.  Toradol dc due to increase in creatinine. Pt for discharge tomorrow.      INTERVAL HPI/OVERNIGHT EVENTS: no new complaints    MEDICATIONS  (STANDING):  amLODIPine   Tablet 5 milliGRAM(s) Oral daily  ascorbic acid 500 milliGRAM(s) Oral two times a day  aspirin  chewable 81 milliGRAM(s) Oral daily  ATENolol  Tablet 50 milliGRAM(s) Oral daily  atorvastatin 10 milliGRAM(s) Oral at bedtime  dextrose 5%. 1000 milliLiter(s) (50 mL/Hr) IV Continuous <Continuous>  dextrose 50% Injectable 25 Gram(s) IV Push once  dextrose 50% Injectable 25 Gram(s) IV Push once  docusate sodium 100 milliGRAM(s) Oral three times a day  enoxaparin Injectable 40 milliGRAM(s) SubCutaneous daily  famotidine    Tablet 20 milliGRAM(s) Oral every 12 hours  ferrous    sulfate 325 milliGRAM(s) Oral three times a day with meals  folic acid 1 milliGRAM(s) Oral daily  gabapentin 300 milliGRAM(s) Oral three times a day  hydrochlorothiazide 25 milliGRAM(s) Oral daily  insulin glargine Injectable (LANTUS) 10 Unit(s) SubCutaneous at bedtime  insulin lispro (HumaLOG) corrective regimen sliding scale   SubCutaneous three times a day before meals  insulin lispro Injectable (HumaLOG) 15 Unit(s) SubCutaneous three times a day before meals  multivitamin 1 Tablet(s) Oral daily  polyethylene glycol 3350 17 Gram(s) Oral daily  sodium chloride 0.9%. 1000 milliLiter(s) (126 mL/Hr) IV Continuous <Continuous>  sodium chloride 0.9%. 1000 milliLiter(s) (75 mL/Hr) IV Continuous <Continuous>  timolol XE 0.25% Solution 1 Drop(s) Both EYES daily  valsartan 320 milliGRAM(s) Oral daily    MEDICATIONS  (PRN):  acetaminophen   Tablet 650 milliGRAM(s) Oral every 6 hours PRN For Temp over 38.3 C (100.94 F)  aluminum hydroxide/magnesium hydroxide/simethicone Suspension 30 milliLiter(s) Oral four times a day PRN Indigestion  bisacodyl Suppository 10 milliGRAM(s) Rectal daily PRN If no bowel movement by postoperative day #2  dextrose 40% Gel 15 Gram(s) Oral once PRN Blood Glucose LESS THAN 70 milliGRAM(s)/deciliter  glucagon  Injectable 1 milliGRAM(s) IntraMuscular once PRN Glucose LESS THAN 70 milligrams/deciliter  magnesium hydroxide Suspension 30 milliLiter(s) Oral daily PRN Constipation  ondansetron Injectable 4 milliGRAM(s) IV Push every 6 hours PRN Nausea and/or Vomiting  senna 2 Tablet(s) Oral at bedtime PRN Constipation  traMADol 50 milliGRAM(s) Oral every 6 hours PRN Severe Pain (7 - 10)      __________________________________________________  REVIEW OF SYSTEMS:    CONSTITUTIONAL: No fever,   NECK: No pain or stiffness  RESPIRATORY: No cough; No shortness of breath  CARDIOVASCULAR: No chest pain, no palpitations  GASTROINTESTINAL: No pain. No nausea or vomiting; No diarrhea   NEUROLOGICAL: No headache or numbness, no tremors  MUSCULOSKELETAL: + left shoulder pian  GENITOURINARY: no dysuria, no frequency, no hesitancy  PSYCHIATRY: no depression , no anxiety  ALL OTHER  ROS negative        Vital Signs Last 24 Hrs  T(C): 36.9 (28 Jun 2018 13:18), Max: 37.3 (28 Jun 2018 05:01)  T(F): 98.4 (28 Jun 2018 13:18), Max: 99.1 (28 Jun 2018 05:01)  HR: 72 (28 Jun 2018 13:18) (72 - 79)  BP: 112/53 (28 Jun 2018 13:18) (105/51 - 126/60)  BP(mean): --  RR: 16 (28 Jun 2018 13:18) (16 - 16)  SpO2: 98% (28 Jun 2018 13:18) (97% - 98%)    ________________________________________________  PHYSICAL EXAM:  GENERAL: NAD  HEENT: Normocephalic;  conjunctivae and sclerae clear; moist mucous membranes;   NECK : supple  CHEST/LUNG: Clear to auscultation bilaterally with good air entry   HEART: S1 S2  regular; no murmurs, gallops or rubs  ABDOMEN: Soft, Nontender, Nondistended; Bowel sounds present  EXTREMITIES: no cyanosis; no edema; no calf tenderness  SKIN: warm and dry; no rash  NERVOUS SYSTEM:  Awake and alert; Oriented  to place, person and time ; no new deficits  MUSCULOSKELETAL: + left shoulder tenderness, decreased rom due to pain    _________________________________________________  LABS:                        10.8   12.3  )-----------( 181      ( 28 Jun 2018 12:55 )             32.7     06-28    139  |  100  |  18  ----------------------------<  335<H>  3.5   |  32<H>  |  1.48<H>    Ca    8.6      28 Jun 2018 12:55          CAPILLARY BLOOD GLUCOSE      POCT Blood Glucose.: 133 mg/dL (28 Jun 2018 16:47)  POCT Blood Glucose.: 370 mg/dL (28 Jun 2018 11:10)  POCT Blood Glucose.: 295 mg/dL (28 Jun 2018 07:32)  POCT Blood Glucose.: 144 mg/dL (27 Jun 2018 22:02)        RADIOLOGY & ADDITIONAL TESTS:    Imaging Personally Reviewed:  YES/NO    Consultant(s) Notes Reviewed:   YES/ No    Care Discussed with Consultants :     Plan of care was discussed with patient and /or primary care giver; all questions and concerns were addressed and care was aligned with patient's wishes.

## 2018-06-28 NOTE — CONSULT NOTE ADULT - ASSESSMENT
73y Female with history of DM, HTN admitted for complete tear of left rotator cuff s/p left shoulder replacement POD 2. Patient is hyperglycemic, BS is 335 medicine consult was called.     Plan:  -Hyperglycemia likely secondary to missed dose of lantus  -Give 10 Lantus now and 10 U at bedtime  -C./w HSS moderate scale  -c/w pre-meal 15 U bid  -continue pain management  -Lovenox for DVT prophylaxis.  -Check HbA1C  -Give gentle hydration for 24 hours  -Discharge on home dose of lantus, pre meal and metformin

## 2018-06-28 NOTE — PROGRESS NOTE ADULT - SUBJECTIVE AND OBJECTIVE BOX
73yFemale    Diagnosis:  S/p Left Total Shoulder Replacement POD#2    Patient was seen and evaluated at bedside. Patient with no acute complaints.   Pain is mild, controlled with PO analgesics.  Denies CP/SOB, dyspnea, paresthesias, N/V/D, palpitations.     Vital Signs Last 24 Hrs  T(C): 37.3 (28 Jun 2018 05:01), Max: 37.3 (28 Jun 2018 05:01)  T(F): 99.1 (28 Jun 2018 05:01), Max: 99.1 (28 Jun 2018 05:01)  HR: 76 (28 Jun 2018 05:01) (72 - 79)  BP: 126/60 (28 Jun 2018 05:01) (90/46 - 126/60)  BP(mean): 73 (27 Jun 2018 11:54) (73 - 73)  RR: 16 (28 Jun 2018 05:01) (16 - 16)  SpO2: 98% (28 Jun 2018 05:01) (96% - 100%)  I&O's Detail      Physical Exam:    General: AAOx3, NAD, resting comfortably in bed.    Left Shoulder:  Dressing is C/D/I. Skin is pink and warm. Staples intact. No erythema. SILT.  Wound with no drainage, healing well.   Upper extremity:  Compartments soft and NT in forearm B/L. 2+pulses. NVI. 5/5  strength B/L.  Good capillary refill.                           11.0   12.0  )-----------( 172      ( 27 Jun 2018 08:21 )             32.7     06-27    139  |  103  |  15  ----------------------------<  255<H>  3.7   |  27  |  1.04    Ca    8.4      27 Jun 2018 08:21        Impression:  73yFemale S/p Left Total Shoulder Replacement POD#2  Plan:  -  Shoulder immobilizer Left Arm.  -  Pain management PRN  -  Daily Physical Theapy:  NWB  of the Left upper extremity. ROM exercises of Left hand encouraged.  -  Discharge planning: Home today.  -  Follow up with Dr. Julio in 2 weeks.  -  Incentive spirometer encouraged  -  Case d/w Dr. Bryant

## 2018-06-28 NOTE — DISCHARGE NOTE ADULT - CARE PLAN
Principal Discharge DX:	Acute pain of left shoulder  Goal:	improve rom  Assessment and plan of treatment:	improve pain  Secondary Diagnosis:	DM (Diabetes Mellitus)  Secondary Diagnosis:	Hypertension

## 2018-06-28 NOTE — CHART NOTE - NSCHARTNOTEFT_GEN_A_CORE
Ortho addendum:    s/p Reverse Left TSA POD#2    Discharge post-poned until tomorrow for elevated Creatinine level.  Pt is asymptomatic with no acute complaints.   Toradol IV was discontinued. Pain well controlled.  Pt denies Chest pain, SOB, dyspnea, paresthesias, N/V/D, abdominal pain, syncope, or pain anywhere else.     Vital Signs Last 24 Hrs  T(C): 36.9 (28 Jun 2018 13:18), Max: 37.3 (28 Jun 2018 05:01)  T(F): 98.4 (28 Jun 2018 13:18), Max: 99.1 (28 Jun 2018 05:01)  HR: 72 (28 Jun 2018 13:18) (72 - 79)  BP: 112/53 (28 Jun 2018 13:18) (105/51 - 126/60)  BP(mean): --  RR: 16 (28 Jun 2018 13:18) (16 - 16)  SpO2: 98% (28 Jun 2018 13:18) (96% - 98%)    PE:   Lt shoulder: dressing c/d/i. no drainage. silt. skin pink.warm.  NVI  LUE: rad/uln/med nerves intact. no edema. NVI SILT.    A: 74 y/o F s/p Reverse Lt TSA POD#2 with elevated Creatinine level.  P:   - Medical consult called. Dr Heredia made aware.   - pain control. d/c toradol  - daily pt: nwb of the LUE  - case d/w dr thomas.  - c/w dvt ppx

## 2018-06-28 NOTE — CONSULT NOTE ADULT - SUBJECTIVE AND OBJECTIVE BOX
Hematology Oncology Consult Note ( )  Discussed with  and recommendations reviewed with the primary team.    The patient was seen and examined    MACK MARAVILLA is a 73y Female with history of Complete tear of left rotator cuff  .      The patient denies chest pain or SOB.  No nausea/vomiting/fevers/chills/night sweats.  No fatigue, headaches/dizziness.  Appetite is stable without weight loss.  No abdominal pain/diarrhea/constipation.  No melena or hematochezia.    No dysuria/hematuria.  No history of easy bruising/bleeding.  No gingival bleeding or epistaxis.  No leg pain or leg swelling.    ROS is otherwise negative.    HEALTH MAINTENANCE:  Breast: last mammogram was performed on ....    Cervical: last pap smear was performed on ....  Colon: last colonoscopy was performed on ....  Prostate: last PSA was ....  Lung: a low dose helical CT was performed on....for RF's including x ppy cigs and current smoker/quit <15 yrs ago       PAST MEDICAL & SURGICAL HISTORY:  Rotator cuff tear, non-traumatic, left  Generalized Osteoarthritis  DM (Diabetes Mellitus)  Hypertension  Hypercholesteremia  History of arthroplasty of left shoulder  H/O total hysterectomy  S/P Cholecystectomy      Allergies:      Medications:  aspirin  chewable 81 milliGRAM(s) Oral daily  enoxaparin Injectable 40 milliGRAM(s) SubCutaneous daily        Social History:    FAMILY HISTORY:  DM (diabetes mellitus)  History of hypertension      PHYSICAL EXAM:    T(F): 98.4 (06-28-18 @ 13:18), Max: 99.1 (06-28-18 @ 05:01)  HR: 72 (06-28-18 @ 13:18) (72 - 79)  BP: 112/53 (06-28-18 @ 13:18) (105/51 - 126/60)  RR: 16 (06-28-18 @ 13:18) (16 - 16)  SpO2: 98% (06-28-18 @ 13:18) (96% - 98%)  Wt(kg): --    Daily     Daily     Gen: well developed, well nourished, comfortable  HEENT: normocephalic/atraumatic, no conjunctival pallor, no scleral icterus, no oral thrush/mucosal bleeding/mucositis  Neck: supple, no masses, no JVD  Breasts: deferred  Back: nontender  Cardiovascular: RR, nl S1S2, no murmurs/rubs/gallops  Respiratory: clear air entry b/l  Gastrointestinal: BS+, soft, NT/ND, no masses, no splenomegaly, no hepatomegaly, no evidence for ascites  Genitourinary: deferred  Rectal: deferred  Extremities: no clubbing/cyanosis, no edema, no calf tenderness  Vascular:  DP/PT 2+ b/l  Neurological: CN 2-12 grossly intact, no focal deficits  Skin: no rash on visible skin  Lymph Nodes:  no cervical/supraclavicular LAD, no axillary/groin LAD  Musculoskeletal:  full ROM  Psychiatric:  mood stable            Labs:                          10.8   12.3  )-----------( 181      ( 28 Jun 2018 12:55 )             32.7     CBC Full  -  ( 28 Jun 2018 12:55 )  WBC Count : 12.3 K/uL  Hemoglobin : 10.8 g/dL  Hematocrit : 32.7 %  Platelet Count - Automated : 181 K/uL  Mean Cell Volume : 92.0 fl  Mean Cell Hemoglobin : 30.4 pg  Mean Cell Hemoglobin Concentration : 33.1 gm/dL  Auto Neutrophil # : x  Auto Lymphocyte # : x  Auto Monocyte # : x  Auto Eosinophil # : x  Auto Basophil # : x  Auto Neutrophil % : x  Auto Lymphocyte % : x  Auto Monocyte % : x  Auto Eosinophil % : x  Auto Basophil % : x        06-28    139  |  100  |  18  ----------------------------<  335<H>  3.5   |  32<H>  |  1.48<H>    Ca    8.6      28 Jun 2018 12:55            Other Labs:    Cultures:    Pathology:    Imaging Studies: PGY 3    MACK MARAVILLA is a 73y Female with history of DM, HTN admitted for complete tear of left rotator cuff s/p left shoulder replacement POD 2. Patient BS is 335 and medicine consult was called.    Patient seen at the bedside. She is still complaining of left shoulder pain and decreased ROM. She missed her lantus yesterday as FS was in 100s. Patient received 30 Humalog in last 24 hours.    The patient denies chest pain or SOB.  No nausea/vomiting/fevers/chills/night sweats. Appetite is stable without weight loss. No abdominal pain/diarrhea/constipation.  No melena or hematochezia.  No dysuria/hematuria. No history of easy bruising/bleeding.  No gingival bleeding or epistaxis. No leg pain or leg swelling.    PAST MEDICAL & SURGICAL HISTORY:  Rotator cuff tear, non-traumatic, left  Generalized Osteoarthritis  DM (Diabetes Mellitus)  Hypertension  Hypercholesteremia  History of arthroplasty of left shoulder  H/O total hysterectomy  S/P Cholecystectomy      Allergies: NKA      MEDICATIONS  (STANDING):  amLODIPine   Tablet 5 milliGRAM(s) Oral daily  ascorbic acid 500 milliGRAM(s) Oral two times a day  aspirin  chewable 81 milliGRAM(s) Oral daily  ATENolol  Tablet 50 milliGRAM(s) Oral daily  atorvastatin 10 milliGRAM(s) Oral at bedtime  dextrose 5%. 1000 milliLiter(s) (50 mL/Hr) IV Continuous <Continuous>  dextrose 50% Injectable 25 Gram(s) IV Push once  dextrose 50% Injectable 25 Gram(s) IV Push once  docusate sodium 100 milliGRAM(s) Oral three times a day  enoxaparin Injectable 40 milliGRAM(s) SubCutaneous daily  famotidine    Tablet 20 milliGRAM(s) Oral every 12 hours  ferrous    sulfate 325 milliGRAM(s) Oral three times a day with meals  folic acid 1 milliGRAM(s) Oral daily  gabapentin 300 milliGRAM(s) Oral three times a day  hydrochlorothiazide 25 milliGRAM(s) Oral daily  insulin glargine Injectable (LANTUS) 20 Unit(s) SubCutaneous at bedtime  insulin lispro (HumaLOG) corrective regimen sliding scale   SubCutaneous three times a day before meals  insulin lispro Injectable (HumaLOG) 15 Unit(s) SubCutaneous three times a day before meals  multivitamin 1 Tablet(s) Oral daily  polyethylene glycol 3350 17 Gram(s) Oral daily  sodium chloride 0.9%. 1000 milliLiter(s) (126 mL/Hr) IV Continuous <Continuous>  timolol XE 0.25% Solution 1 Drop(s) Both EYES daily  valsartan 320 milliGRAM(s) Oral daily    MEDICATIONS  (PRN):  acetaminophen   Tablet 650 milliGRAM(s) Oral every 6 hours PRN For Temp over 38.3 C (100.94 F)  aluminum hydroxide/magnesium hydroxide/simethicone Suspension 30 milliLiter(s) Oral four times a day PRN Indigestion  bisacodyl Suppository 10 milliGRAM(s) Rectal daily PRN If no bowel movement by postoperative day #2  dextrose 40% Gel 15 Gram(s) Oral once PRN Blood Glucose LESS THAN 70 milliGRAM(s)/deciliter  glucagon  Injectable 1 milliGRAM(s) IntraMuscular once PRN Glucose LESS THAN 70 milligrams/deciliter  magnesium hydroxide Suspension 30 milliLiter(s) Oral daily PRN Constipation  ondansetron Injectable 4 milliGRAM(s) IV Push every 6 hours PRN Nausea and/or Vomiting  senna 2 Tablet(s) Oral at bedtime PRN Constipation  traMADol 50 milliGRAM(s) Oral every 6 hours PRN Severe Pain (7 - 10)    Social History:    FAMILY HISTORY:  DM (diabetes mellitus)  History of hypertension      PHYSICAL EXAM:    T(F): 98.4 (06-28-18 @ 13:18), Max: 99.1 (06-28-18 @ 05:01)  HR: 72 (06-28-18 @ 13:18) (72 - 79)  BP: 112/53 (06-28-18 @ 13:18) (105/51 - 126/60)  RR: 16 (06-28-18 @ 13:18) (16 - 16)  SpO2: 98% (06-28-18 @ 13:18) (96% - 98%)  Wt(kg): --    Daily     Daily     Gen: well developed, well nourished, comfortable  HEENT: normocephalic/atraumatic, no conjunctival pallor, no scleral icterus, no oral thrush/mucosal bleeding/mucositis  Neck: supple, no masses, no JVD  Cardiovascular: RR, nl S1S2, no murmurs/rubs/gallops  Respiratory: clear air entry b/l  Gastrointestinal: BS+, soft, NT/ND, no masses, no splenomegaly, no hepatomegaly, no evidence for ascites  Genitourinary: deferred  Rectal: deferred  Extremities: no clubbing/cyanosis, no edema, no calf tenderness  Vascular:  DP/PT 2+ b/l  Neurological: CN 2-12 grossly intact, no focal deficits  Skin: no rash on visible skin  Lymph Nodes:  no cervical/supraclavicular LAD, no axillary/groin LAD  Musculoskeletal:  decrease ROM left shoulder.      Labs:                          10.8   12.3  )-----------( 181      ( 28 Jun 2018 12:55 )             32.7     CBC Full  -  ( 28 Jun 2018 12:55 )  WBC Count : 12.3 K/uL  Hemoglobin : 10.8 g/dL  Hematocrit : 32.7 %  Platelet Count - Automated : 181 K/uL  Mean Cell Volume : 92.0 fl  Mean Cell Hemoglobin : 30.4 pg  Mean Cell Hemoglobin Concentration : 33.1 gm/dL  Auto Neutrophil # : x  Auto Lymphocyte # : x  Auto Monocyte # : x  Auto Eosinophil # : x  Auto Basophil # : x  Auto Neutrophil % : x  Auto Lymphocyte % : x  Auto Monocyte % : x  Auto Eosinophil % : x  Auto Basophil % : x        06-28    139  |  100  |  18  ----------------------------<  335<H>  3.5   |  32<H>  |  1.48<H>    Ca    8.6      28 Jun 2018 12:55

## 2018-06-28 NOTE — DISCHARGE NOTE ADULT - ADDITIONAL INSTRUCTIONS
Pain Management- See Attached Medication Reconciliation  Weight Bearing Status: NWB of the Lt shoulder in sling  Equipment needs: Commode, Walker  Dressing: Please keep bandage/dressing Clean, Dry, and Intact.  Incision Site: REMOVE STAPLES on 7/12/2018  STAPLES AND DRESSING TO BE REMOVED BY NURSE  NURSE TO APPLY STERI-STRIPS TO THE WOUND AFTER STAPLE REMOVAL   Dvt prophylaxis: Take Aspirin 325mg po bid x 30days as prescribed  PT/Occupational Therapy are Activities of Daily Living as appropriate  Follow up with Orthopedic Surgeon after STAPLES ARE REMOVED at: dr thomas [phone]

## 2018-06-28 NOTE — DISCHARGE NOTE ADULT - PATIENT PORTAL LINK FT
You can access the [facility] Portal, offered by [facility], by registering with the following website: [email or web address]

## 2018-06-28 NOTE — DISCHARGE NOTE ADULT - CARE PROVIDER_API CALL
Austen Bryant (MD), Orthopaedic Surgery; Sports Medicine  [address] [place] [address]  Phone: [phone]  Fax: [phone]

## 2018-06-28 NOTE — DISCHARGE NOTE ADULT - MEDICATION SUMMARY - MEDICATIONS TO TAKE
I will START or STAY ON the medications listed below when I get home from the hospital:    aspirin 325 mg oral tablet  -- 1 tab(s) by mouth 2 times a day MDD:2  -- Take with food or milk.    -- Indication: For Dvt ppx    Percocet 5/325 oral tablet  -- 1-2  tab(s) by mouth every 6 hours, As Needed MDD:6 - for moderate pain Supervising MD: Dr. Austen Bryant. Lic#[id number] [facility] [id number]   -- Caution federal law prohibits the transfer of this drug to any person other  than the person for whom it was prescribed.  May cause drowsiness.  Alcohol may intensify this effect.  Use care when operating dangerous machinery.  This prescription cannot be refilled.  This product contains acetaminophen.  Do not use  with any other product containing acetaminophen to prevent possible liver damage.  Using more of this medication than prescribed may cause serious breathing problems.    -- Indication: For pain    gabapentin 300 mg oral capsule  -- 1 cap(s) by mouth 3 times a day  -- Indication: For pain    Lantus 100 units/mL subcutaneous solution  -- 20 unit(s) subcutaneous once (at bedtime)  -- Indication: For As per md    NovoLOG 100 units/mL subcutaneous solution  -- 15  subcutaneous 2 times a day  -- Indication: For As per md    metFORMIN 1000 mg oral tablet  -- 1 tab(s) by mouth 2 times a day  -- Indication: For As per md    pravastatin 20 mg oral tablet  -- 1 tab(s) by mouth once a day  -- Indication: For As per md    valsartan-hydrochlorothiazide 320mg-25mg oral tablet  -- 1 tab(s) by mouth once a day  -- Indication: For As per md    Evista 60 mg oral tablet  -- 1 tab(s) by mouth once a day  -- Indication: For As per md    atenolol 50 mg oral tablet  -- 1 tab(s) by mouth once a day  -- Indication: For As per md    amLODIPine 5 mg oral tablet  -- 1 tab(s) by mouth once a day  -- Indication: For Htn    ferrous sulfate 325 mg (65 mg elemental iron) oral tablet  -- 1 tab(s) by mouth 2 times a day MDD:2  -- Check with your doctor before becoming pregnant.  Do not chew, break, or crush.  May discolor urine or feces.    -- Indication: For Anemia    Colace 100 mg oral capsule  -- 1 cap(s) by mouth 3 times a day, As Needed -for constipation MDD:3   -- Medication should be taken with plenty of water.    -- Indication: For Constipation    timolol hemihydrate 0.5% ophthalmic solution  -- 1  to each affected eye once a day  -- Indication: For As per md    Vitamin D3 1000 intl units oral tablet  -- 1 tab(s) by mouth once a day  -- Indication: For As per md    Vitamin B12  -- 2000 microgram(s) by mouth once a day  -- Indication: For As per md I will START or STAY ON the medications listed below when I get home from the hospital:    aspirin 325 mg oral tablet  -- 1 tab(s) by mouth 2 times a day MDD:2  -- Take with food or milk.    -- Indication: For Dvt ppx    traMADol 50 mg oral tablet  -- 1 tab(s) by mouth every 6 hours, As Needed -for anxiety- for moderate pain MDD:4 Supervising MD: Dr. Austen Bryant. Lic#[id number] [facility] [id number]  -- Caution federal law prohibits the transfer of this drug to any person other  than the person for whom it was prescribed.  May cause drowsiness.  Alcohol may intensify this effect.  Use care when operating dangerous machinery.  Obtain medical advice before taking any non-prescription drugs as some may affect the action of this medication.    -- Indication: For prn pain    gabapentin 300 mg oral capsule  -- 1 cap(s) by mouth 3 times a day  -- Indication: For pain    NovoLOG 100 units/mL subcutaneous solution  -- 15  subcutaneous 2 times a day  -- Indication: For As per md    Lantus 100 units/mL subcutaneous solution  -- 20 unit(s) subcutaneous once (at bedtime)  -- Indication: For As per md    metFORMIN 1000 mg oral tablet  -- 1 tab(s) by mouth 2 times a day  -- Indication: For As per md    pravastatin 20 mg oral tablet  -- 1 tab(s) by mouth once a day  -- Indication: For As per md    valsartan-hydrochlorothiazide 320mg-25mg oral tablet  -- 1 tab(s) by mouth once a day  -- Indication: For As per md    Evista 60 mg oral tablet  -- 1 tab(s) by mouth once a day  -- Indication: For As per md    atenolol 50 mg oral tablet  -- 1 tab(s) by mouth once a day  -- Indication: For As per md    amLODIPine 5 mg oral tablet  -- 1 tab(s) by mouth once a day  -- Indication: For Htn    ferrous sulfate 325 mg (65 mg elemental iron) oral tablet  -- 1 tab(s) by mouth 2 times a day MDD:2  -- Check with your doctor before becoming pregnant.  Do not chew, break, or crush.  May discolor urine or feces.    -- Indication: For Anemia    Colace 100 mg oral capsule  -- 1 cap(s) by mouth 3 times a day, As Needed -for constipation MDD:3   -- Medication should be taken with plenty of water.    -- Indication: For Constipation    timolol hemihydrate 0.5% ophthalmic solution  -- 1  to each affected eye once a day  -- Indication: For As per md    Vitamin D3 1000 intl units oral tablet  -- 1 tab(s) by mouth once a day  -- Indication: For As per md    Vitamin B12  -- 2000 microgram(s) by mouth once a day  -- Indication: For As per md I will START or STAY ON the medications listed below when I get home from the hospital:    aspirin 325 mg oral tablet  -- 1 tab(s) by mouth 2 times a day MDD:2  -- Take with food or milk.    -- Indication: For Dvt ppx    traMADol 50 mg oral tablet  -- 1 tab(s) by mouth every 6 hours, As Needed -for anxiety- for moderate pain MDD:4 Supervising MD: Dr. Austen Bryant. Lic#[id number] [facility] [id number]  -- Caution federal law prohibits the transfer of this drug to any person other  than the person for whom it was prescribed.  May cause drowsiness.  Alcohol may intensify this effect.  Use care when operating dangerous machinery.  Obtain medical advice before taking any non-prescription drugs as some may affect the action of this medication.    -- Indication: For prn pain    gabapentin 300 mg oral capsule  -- 1 cap(s) by mouth 3 times a day  -- Indication: For pain    NovoLOG 100 units/mL subcutaneous solution  -- 15  subcutaneous 2 times a day  -- Indication: For As per md    Lantus 100 units/mL subcutaneous solution  -- 20 unit(s) subcutaneous once (at bedtime)  -- Indication: For As per md    metFORMIN 1000 mg oral tablet  -- 1 tab(s) by mouth 2 times a day  -- Indication: For As per md    pravastatin 20 mg oral tablet  -- 1 tab(s) by mouth once a day  -- Indication: For As per md    valsartan-hydrochlorothiazide 320mg-25mg oral tablet  -- 1 tab(s) by mouth once a day  -- Indication: For As per md    Evista 60 mg oral tablet  -- 1 tab(s) by mouth once a day  -- Indication: For As per md    atenolol 50 mg oral tablet  -- 1 tab(s) by mouth once a day  -- Indication: For As per md    amLODIPine 5 mg oral tablet  -- 1 tab(s) by mouth once a day  -- Indication: For Htn    ferrous sulfate 325 mg (65 mg elemental iron) oral tablet  -- 1 tab(s) by mouth 2 times a day MDD:2  -- Check with your doctor before becoming pregnant.  Do not chew, break, or crush.  May discolor urine or feces.    -- Indication: For Anemia    Colace 100 mg oral capsule  -- 1 cap(s) by mouth 3 times a day, As Needed -for constipation MDD:3   -- Medication should be taken with plenty of water.    -- Indication: For Constipation    timolol hemihydrate 0.5% ophthalmic solution  -- 1  to each affected eye once a day  -- Indication: For As per md    Vitamin D3 1000 intl units oral tablet  -- 1 tab(s) by mouth once a day  -- Indication: For As per md    Vitamin B12  -- 2000 microgram(s) by mouth once a day  -- Indication: For As per md

## 2018-06-29 VITALS — TEMPERATURE: 100 F

## 2018-06-29 LAB
ANION GAP SERPL CALC-SCNC: 7 MMOL/L — SIGNIFICANT CHANGE UP (ref 5–17)
BUN SERPL-MCNC: 18 MG/DL — SIGNIFICANT CHANGE UP (ref 7–18)
CALCIUM SERPL-MCNC: 8.3 MG/DL — LOW (ref 8.4–10.5)
CHLORIDE SERPL-SCNC: 103 MMOL/L — SIGNIFICANT CHANGE UP (ref 96–108)
CO2 SERPL-SCNC: 29 MMOL/L — SIGNIFICANT CHANGE UP (ref 22–31)
CREAT SERPL-MCNC: 1.22 MG/DL — SIGNIFICANT CHANGE UP (ref 0.5–1.3)
GLUCOSE BLDC GLUCOMTR-MCNC: 196 MG/DL — HIGH (ref 70–99)
GLUCOSE BLDC GLUCOMTR-MCNC: 228 MG/DL — HIGH (ref 70–99)
GLUCOSE BLDC GLUCOMTR-MCNC: 246 MG/DL — HIGH (ref 70–99)
GLUCOSE SERPL-MCNC: 209 MG/DL — HIGH (ref 70–99)
HCT VFR BLD CALC: 30.1 % — LOW (ref 34.5–45)
HGB BLD-MCNC: 9.7 G/DL — LOW (ref 11.5–15.5)
MCHC RBC-ENTMCNC: 29.7 PG — SIGNIFICANT CHANGE UP (ref 27–34)
MCHC RBC-ENTMCNC: 32.3 GM/DL — SIGNIFICANT CHANGE UP (ref 32–36)
MCV RBC AUTO: 91.9 FL — SIGNIFICANT CHANGE UP (ref 80–100)
PLATELET # BLD AUTO: 156 K/UL — SIGNIFICANT CHANGE UP (ref 150–400)
POTASSIUM SERPL-MCNC: 3.3 MMOL/L — LOW (ref 3.5–5.3)
POTASSIUM SERPL-SCNC: 3.3 MMOL/L — LOW (ref 3.5–5.3)
RBC # BLD: 3.28 M/UL — LOW (ref 3.8–5.2)
RBC # FLD: 12.6 % — SIGNIFICANT CHANGE UP (ref 10.3–14.5)
SODIUM SERPL-SCNC: 139 MMOL/L — SIGNIFICANT CHANGE UP (ref 135–145)
SURGICAL PATHOLOGY FINAL REPORT - CH: SIGNIFICANT CHANGE UP
WBC # BLD: 9.7 K/UL — SIGNIFICANT CHANGE UP (ref 3.8–10.5)
WBC # FLD AUTO: 9.7 K/UL — SIGNIFICANT CHANGE UP (ref 3.8–10.5)

## 2018-06-29 PROCEDURE — C1776: CPT

## 2018-06-29 PROCEDURE — 73030 X-RAY EXAM OF SHOULDER: CPT

## 2018-06-29 PROCEDURE — 86850 RBC ANTIBODY SCREEN: CPT

## 2018-06-29 PROCEDURE — 80048 BASIC METABOLIC PNL TOTAL CA: CPT

## 2018-06-29 PROCEDURE — C1713: CPT

## 2018-06-29 PROCEDURE — 88311 DECALCIFY TISSUE: CPT

## 2018-06-29 PROCEDURE — 85027 COMPLETE CBC AUTOMATED: CPT

## 2018-06-29 PROCEDURE — 86900 BLOOD TYPING SEROLOGIC ABO: CPT

## 2018-06-29 PROCEDURE — 82962 GLUCOSE BLOOD TEST: CPT

## 2018-06-29 PROCEDURE — 88305 TISSUE EXAM BY PATHOLOGIST: CPT

## 2018-06-29 PROCEDURE — 86901 BLOOD TYPING SEROLOGIC RH(D): CPT

## 2018-06-29 RX ORDER — POTASSIUM CHLORIDE 20 MEQ
40 PACKET (EA) ORAL ONCE
Qty: 0 | Refills: 0 | Status: DISCONTINUED | OUTPATIENT
Start: 2018-06-29 | End: 2018-06-29

## 2018-06-29 RX ORDER — POTASSIUM CHLORIDE 20 MEQ
40 PACKET (EA) ORAL ONCE
Qty: 0 | Refills: 0 | Status: COMPLETED | OUTPATIENT
Start: 2018-06-29 | End: 2018-06-29

## 2018-06-29 RX ORDER — INSULIN GLARGINE 100 [IU]/ML
20 INJECTION, SOLUTION SUBCUTANEOUS AT BEDTIME
Qty: 0 | Refills: 0 | Status: DISCONTINUED | OUTPATIENT
Start: 2018-06-29 | End: 2018-06-29

## 2018-06-29 RX ADMIN — Medication 100 MILLIGRAM(S): at 15:37

## 2018-06-29 RX ADMIN — Medication 1 TABLET(S): at 12:18

## 2018-06-29 RX ADMIN — Medication 4: at 12:19

## 2018-06-29 RX ADMIN — Medication 2: at 17:05

## 2018-06-29 RX ADMIN — POLYETHYLENE GLYCOL 3350 17 GRAM(S): 17 POWDER, FOR SOLUTION ORAL at 12:14

## 2018-06-29 RX ADMIN — GABAPENTIN 300 MILLIGRAM(S): 400 CAPSULE ORAL at 06:31

## 2018-06-29 RX ADMIN — Medication 500 MILLIGRAM(S): at 06:31

## 2018-06-29 RX ADMIN — Medication 325 MILLIGRAM(S): at 08:15

## 2018-06-29 RX ADMIN — Medication 1 MILLIGRAM(S): at 12:18

## 2018-06-29 RX ADMIN — Medication 500 MILLIGRAM(S): at 17:17

## 2018-06-29 RX ADMIN — Medication 40 MILLIEQUIVALENT(S): at 12:13

## 2018-06-29 RX ADMIN — Medication 1 DROP(S): at 12:14

## 2018-06-29 RX ADMIN — Medication 4: at 08:15

## 2018-06-29 RX ADMIN — ENOXAPARIN SODIUM 40 MILLIGRAM(S): 100 INJECTION SUBCUTANEOUS at 12:14

## 2018-06-29 RX ADMIN — GABAPENTIN 300 MILLIGRAM(S): 400 CAPSULE ORAL at 15:37

## 2018-06-29 RX ADMIN — Medication 100 MILLIGRAM(S): at 06:31

## 2018-06-29 RX ADMIN — Medication 15 UNIT(S): at 17:07

## 2018-06-29 RX ADMIN — Medication 81 MILLIGRAM(S): at 12:14

## 2018-06-29 RX ADMIN — FAMOTIDINE 20 MILLIGRAM(S): 10 INJECTION INTRAVENOUS at 06:31

## 2018-06-29 RX ADMIN — Medication 15 UNIT(S): at 12:18

## 2018-06-29 RX ADMIN — RALOXIFENE HYDROCHLORIDE 60 MILLIGRAM(S): 60 TABLET, COATED ORAL at 12:13

## 2018-06-29 RX ADMIN — Medication 15 UNIT(S): at 08:15

## 2018-06-29 RX ADMIN — Medication 1 ENEMA: at 15:53

## 2018-06-29 RX ADMIN — FAMOTIDINE 20 MILLIGRAM(S): 10 INJECTION INTRAVENOUS at 17:17

## 2018-06-29 NOTE — PROGRESS NOTE ADULT - SUBJECTIVE AND OBJECTIVE BOX
Patient is a 73y old  Female who presents with a chief complaint of S/p Reverse Lt total shoulder replacement (28 Jun 2018 11:40)    PATIENT IS SEEN AND EXAMINED IN SURGICAL FLOOR.    ALLERGIES:  No Known Allergies      VITALS:    Vital Signs Last 24 Hrs  T(C): 37.4 (29 Jun 2018 12:07), Max: 37.8 (28 Jun 2018 22:19)  T(F): 99.4 (29 Jun 2018 12:07), Max: 100.1 (29 Jun 2018 05:05)  HR: 82 (29 Jun 2018 12:07) (72 - 84)  BP: 103/52 (29 Jun 2018 12:07) (103/52 - 112/53)  BP(mean): --  RR: 17 (29 Jun 2018 05:05) (16 - 17)  SpO2: 96% (29 Jun 2018 05:05) (96% - 98%)    LABS:  CBC Full  -  ( 29 Jun 2018 06:35 )  WBC Count : 9.7 K/uL  Hemoglobin : 9.7 g/dL  Hematocrit : 30.1 %  Platelet Count - Automated : 156 K/uL  Mean Cell Volume : 91.9 fl  Mean Cell Hemoglobin : 29.7 pg  Mean Cell Hemoglobin Concentration : 32.3 gm/dL  Auto Neutrophil # : x  Auto Lymphocyte # : x  Auto Monocyte # : x  Auto Eosinophil # : x  Auto Basophil # : x  Auto Neutrophil % : x  Auto Lymphocyte % : x  Auto Monocyte % : x  Auto Eosinophil % : x  Auto Basophil % : x      06-29    139  |  103  |  18  ----------------------------<  209<H>  3.3<L>   |  29  |  1.22    Ca    8.3<L>      29 Jun 2018 06:35      CAPILLARY BLOOD GLUCOSE    POCT Blood Glucose.: 228 mg/dL (29 Jun 2018 11:43)  POCT Blood Glucose.: 246 mg/dL (29 Jun 2018 07:27)  POCT Blood Glucose.: 229 mg/dL (28 Jun 2018 22:06)  POCT Blood Glucose.: 133 mg/dL (28 Jun 2018 16:47)    MEDICATIONS:    MEDICATIONS  (STANDING):  amLODIPine   Tablet 5 milliGRAM(s) Oral daily  ascorbic acid 500 milliGRAM(s) Oral two times a day  aspirin  chewable 81 milliGRAM(s) Oral daily  ATENolol  Tablet 50 milliGRAM(s) Oral daily  atorvastatin 10 milliGRAM(s) Oral at bedtime  dextrose 5%. 1000 milliLiter(s) (50 mL/Hr) IV Continuous <Continuous>  dextrose 50% Injectable 25 Gram(s) IV Push once  dextrose 50% Injectable 25 Gram(s) IV Push once  docusate sodium 100 milliGRAM(s) Oral three times a day  enoxaparin Injectable 40 milliGRAM(s) SubCutaneous daily  famotidine    Tablet 20 milliGRAM(s) Oral every 12 hours  ferrous    sulfate 325 milliGRAM(s) Oral three times a day with meals  folic acid 1 milliGRAM(s) Oral daily  gabapentin 300 milliGRAM(s) Oral three times a day  hydrochlorothiazide 25 milliGRAM(s) Oral daily  insulin glargine Injectable (LANTUS) 20 Unit(s) SubCutaneous at bedtime  insulin lispro (HumaLOG) corrective regimen sliding scale   SubCutaneous three times a day before meals  insulin lispro Injectable (HumaLOG) 15 Unit(s) SubCutaneous three times a day before meals  multivitamin 1 Tablet(s) Oral daily  polyethylene glycol 3350 17 Gram(s) Oral daily  raloxifene 60 milliGRAM(s) Oral daily  sodium chloride 0.9%. 1000 milliLiter(s) (126 mL/Hr) IV Continuous <Continuous>  sodium chloride 0.9%. 1000 milliLiter(s) (75 mL/Hr) IV Continuous <Continuous>  timolol XE 0.25% Solution 1 Drop(s) Both EYES daily  valsartan 320 milliGRAM(s) Oral daily      MEDICATIONS  (PRN):  acetaminophen   Tablet 650 milliGRAM(s) Oral every 6 hours PRN For Temp over 38.3 C (100.94 F)  aluminum hydroxide/magnesium hydroxide/simethicone Suspension 30 milliLiter(s) Oral four times a day PRN Indigestion  bisacodyl Suppository 10 milliGRAM(s) Rectal daily PRN If no bowel movement by postoperative day #2  dextrose 40% Gel 15 Gram(s) Oral once PRN Blood Glucose LESS THAN 70 milliGRAM(s)/deciliter  glucagon  Injectable 1 milliGRAM(s) IntraMuscular once PRN Glucose LESS THAN 70 milligrams/deciliter  magnesium hydroxide Suspension 30 milliLiter(s) Oral daily PRN Constipation  ondansetron Injectable 4 milliGRAM(s) IV Push every 6 hours PRN Nausea and/or Vomiting  senna 2 Tablet(s) Oral at bedtime PRN Constipation  traMADol 50 milliGRAM(s) Oral every 6 hours PRN Severe Pain (7 - 10)      REVIEW OF SYSTEMS:                           ALL ROS DONE [ X   ]    CONSTITUTIONAL:  LETHARGIC [   ], FEVER [   ], UNRESPONSIVE [   ]  CVS:  CP  [   ], SOB, [   ], PALPITATIONS [   ], DIZZYNESS [   ]  RS: COUGH [   ], SPUTUM [   ]  GI: ABDOMINAL PAIN [   ], NAUSEA [   ], VOMITINGS [   ], DIARRHEA [   ], CONSTIPATION [   ]  :  DYSURIA [   ], NOCTURIA [   ], INCREASED FREQUENCY [   ], DRIBLING [   ],  SKELETAL: PAINFUL JOINTS [   ], SWOLLEN JOINTS [   ], NECK ACHE [   ], LOW BACK ACHE [   ],  SKIN : ULCERS [   ], RASH [   ], ITCHING [   ]  CNS: HEAD ACHE [   ], DOUBLE VISION [   ], BLURRED VISION [   ], AMS / CONFUSION [   ], SEIZURES [   ], WEAKNESS [   ],TINGLING / NUMBNESS [   ]    PHYSICAL EXAMINATION:  GENERAL APPEARANCE: NO DISTRESS  HEENT:  NO PALLOR, NO  JVD,  NO   NODES, NECK SUPPLE  CVS: S1 +, S2 +,   RS: AEEB,  OCCASIONAL  RALES +,   NO RONCHI  ABD: SOFT, NT, NO, BS +  EXT: NO PE  SKIN: WARM,   SKELETAL:  ROM ACCEPTABLE  CNS:  AAO X  3  ,  NO DEFICITS    RADIOLOGY :    < from: Xray Shoulder 2 Views, Left (06.26.18 @ 15:30) >  MPRESSION:   S/P left shoulder replacement.  No fracture-subluxation demonstrated.  No periprosthetic fracture.    < end of copied text >      ASSESSMENT :     Encounter for other preprocedural examination  Complete tear of left rotator cuff  Rotator cuff tear, non-traumatic, left  Generalized Osteoarthritis  S/P Hysterectomy  DM (Diabetes Mellitus)  Hypertension  S/P Hysterectomy  Hypercholesteremia  History of arthroplasty of left shoulder  H/O total hysterectomy  S/P Cholecystectomy      PLAN:  HPI:    - UNCONTROLLED DM, MILD ARF - RESOLVED WITH IVF, TITRATION OF INSULIN. D/W PATIENT AND STAFF - DC PLAN WITH HOME MEDS AND DOSES SINCE PATIENT CLAIMS THAT HER SUGARS ARE CONTROLEED WELL WITH THE MEDS  - LEFT ROTATOR CUFF TEAR - S/P LEFT SHOULDER REPLACEMENT ON PAIN Rx AND DVT PROPHYLAXIS  - DC PLAN AS PER ORTHO  - DR. VELÁZQUEZ

## 2018-06-29 NOTE — PROGRESS NOTE ADULT - SUBJECTIVE AND OBJECTIVE BOX
73yFemale    Diagnosis:  S/p Left Total Shoulder Replacement POD#3    Patient was seen and evaluated at bedside. Patient with no acute complaints.   Pain is mild, controlled with PO analgesics.  Denies CP/SOB, dyspnea, paresthesias, N/V/D, palpitations.     Vital Signs Last 24 Hrs  T(C): 37.5 (29 Jun 2018 14:47), Max: 37.8 (28 Jun 2018 22:19)  T(F): 99.5 (29 Jun 2018 14:47), Max: 100.1 (29 Jun 2018 05:05)  HR: 69 (29 Jun 2018 14:47) (69 - 84)  BP: 113/55 (29 Jun 2018 14:47) (103/52 - 113/55)  BP(mean): --  RR: 16 (29 Jun 2018 14:47) (16 - 17)  SpO2: 99% (29 Jun 2018 14:47) (96% - 99%)      Physical Exam:    General: AAOx3, NAD, resting comfortably in bed.    Left Shoulder:  Dressing is C/D/I. Skin is pink and warm. Staples intact. No erythema. SILT.  Wound with no drainage, healing well.   Upper extremity:  Compartments soft and NT in forearm B/L. 2+pulses. NVI. 5/5  strength B/L.  Good capillary refill.                                              9.7    9.7   )-----------( 156      ( 29 Jun 2018 06:35 )             30.1       06-29    139  |  103  |  18  ----------------------------<  209<H>  3.3<L>   |  29  |  1.22    Ca    8.3<L>      29 Jun 2018 06:35            Impression:  73yFemale S/p Left Total Shoulder Replacement POD#3  Plan:  -  Shoulder immobilizer Left Arm.  -  Pain management PRN  -  Daily Physical Theapy:  NWB  of the Left upper extremity. ROM exercises of Left hand encouraged.  -  Discharge planning: Home today.  -  Dressing change  -  Follow up with Dr. Julio in 2 weeks.  -  Incentive spirometer encouraged  -  Case d/w Dr. Bryant 73yFemale    Diagnosis:  S/p Left Total Shoulder Replacement POD#3    Patient was seen and evaluated at bedside. Patient with no acute complaints.   Pain is mild, controlled with PO analgesics.  Denies CP/SOB, dyspnea, paresthesias, N/V/D, palpitations.     Vital Signs Last 24 Hrs  T(C): 37.5 (29 Jun 2018 14:47), Max: 37.8 (28 Jun 2018 22:19)  T(F): 99.5 (29 Jun 2018 14:47), Max: 100.1 (29 Jun 2018 05:05)  HR: 69 (29 Jun 2018 14:47) (69 - 84)  BP: 113/55 (29 Jun 2018 14:47) (103/52 - 113/55)  BP(mean): --  RR: 16 (29 Jun 2018 14:47) (16 - 17)  SpO2: 99% (29 Jun 2018 14:47) (96% - 99%)      Physical Exam:    General: AAOx3, NAD, resting comfortably in bed.    Left Shoulder:  Dressing is C/D/I. Skin is pink and warm. Staples intact. No erythema. SILT.  Wound with no drainage, healing well.   Upper extremity:  Compartments soft and NT in forearm B/L. 2+pulses. NVI. 5/5  strength B/L.  Good capillary refill.                                              9.7    9.7   )-----------( 156      ( 29 Jun 2018 06:35 )             30.1       06-29    139  |  103  |  18  ----------------------------<  209<H>  3.3<L>   |  29  |  1.22    Ca    8.3<L>      29 Jun 2018 06:35            Impression:  73yFemale S/p Left Total Shoulder Replacement POD#3  Plan:  -  Shoulder immobilizer Left Arm.  -  Pain management PRN  -  Daily Physical Theapy:  NWB  of the Left upper extremity. ROM exercises of Left hand encouraged.  -  Discharge planning: Home today.  -  Dressing change  -  Follow up with Dr. Julio in 2 weeks.  -  Incentive spirometer encouraged  -  Hypokalemia - Replace K  -  Case d/w Dr. Bryant

## 2018-07-23 PROBLEM — M75.102 UNSPECIFIED ROTATOR CUFF TEAR OR RUPTURE OF LEFT SHOULDER, NOT SPECIFIED AS TRAUMATIC: Chronic | Status: ACTIVE | Noted: 2017-10-19

## 2018-07-26 ENCOUNTER — APPOINTMENT (OUTPATIENT)
Dept: CARDIOLOGY | Facility: CLINIC | Age: 73
End: 2018-07-26
Payer: MEDICARE

## 2018-07-26 ENCOUNTER — NON-APPOINTMENT (OUTPATIENT)
Age: 73
End: 2018-07-26

## 2018-07-26 VITALS
BODY MASS INDEX: 35.15 KG/M2 | WEIGHT: 191 LBS | HEIGHT: 62 IN | SYSTOLIC BLOOD PRESSURE: 96 MMHG | DIASTOLIC BLOOD PRESSURE: 60 MMHG | OXYGEN SATURATION: 98 % | HEART RATE: 66 BPM

## 2018-07-26 DIAGNOSIS — Z01.818 ENCOUNTER FOR OTHER PREPROCEDURAL EXAMINATION: ICD-10-CM

## 2018-07-26 PROCEDURE — 93306 TTE W/DOPPLER COMPLETE: CPT

## 2018-07-26 PROCEDURE — 93000 ELECTROCARDIOGRAM COMPLETE: CPT

## 2018-07-26 PROCEDURE — 99214 OFFICE O/P EST MOD 30 MIN: CPT

## 2018-08-01 ENCOUNTER — OUTPATIENT (OUTPATIENT)
Dept: OUTPATIENT SERVICES | Facility: HOSPITAL | Age: 73
LOS: 1 days | End: 2018-08-01
Payer: MEDICARE

## 2018-08-01 DIAGNOSIS — Z98.890 OTHER SPECIFIED POSTPROCEDURAL STATES: Chronic | ICD-10-CM

## 2018-08-01 DIAGNOSIS — Z90.710 ACQUIRED ABSENCE OF BOTH CERVIX AND UTERUS: Chronic | ICD-10-CM

## 2018-08-01 PROCEDURE — G9001: CPT

## 2018-08-13 ENCOUNTER — EMERGENCY (EMERGENCY)
Facility: HOSPITAL | Age: 73
LOS: 1 days | Discharge: HOME | End: 2018-08-13
Attending: EMERGENCY MEDICINE | Admitting: EMERGENCY MEDICINE
Payer: MEDICARE

## 2018-08-13 VITALS
DIASTOLIC BLOOD PRESSURE: 59 MMHG | HEART RATE: 66 BPM | SYSTOLIC BLOOD PRESSURE: 112 MMHG | OXYGEN SATURATION: 100 % | TEMPERATURE: 99 F | RESPIRATION RATE: 16 BRPM

## 2018-08-13 VITALS
TEMPERATURE: 99 F | OXYGEN SATURATION: 100 % | RESPIRATION RATE: 18 BRPM | HEART RATE: 68 BPM | SYSTOLIC BLOOD PRESSURE: 158 MMHG | DIASTOLIC BLOOD PRESSURE: 85 MMHG

## 2018-08-13 DIAGNOSIS — Z98.890 OTHER SPECIFIED POSTPROCEDURAL STATES: Chronic | ICD-10-CM

## 2018-08-13 DIAGNOSIS — Z90.710 ACQUIRED ABSENCE OF BOTH CERVIX AND UTERUS: Chronic | ICD-10-CM

## 2018-08-13 LAB
ALBUMIN SERPL ELPH-MCNC: 3.5 G/DL — SIGNIFICANT CHANGE UP (ref 3.3–5)
ALP SERPL-CCNC: 64 U/L — SIGNIFICANT CHANGE UP (ref 40–120)
ALT FLD-CCNC: 11 U/L — SIGNIFICANT CHANGE UP (ref 4–33)
APPEARANCE UR: CLEAR — SIGNIFICANT CHANGE UP
AST SERPL-CCNC: 17 U/L — SIGNIFICANT CHANGE UP (ref 4–32)
BASE EXCESS BLDV CALC-SCNC: 6.5 MMOL/L — SIGNIFICANT CHANGE UP
BASOPHILS # BLD AUTO: 0.04 K/UL — SIGNIFICANT CHANGE UP (ref 0–0.2)
BASOPHILS NFR BLD AUTO: 0.5 % — SIGNIFICANT CHANGE UP (ref 0–2)
BILIRUB SERPL-MCNC: < 0.2 MG/DL — LOW (ref 0.2–1.2)
BILIRUB UR-MCNC: NEGATIVE — SIGNIFICANT CHANGE UP
BLOOD GAS VENOUS - CREATININE: 1.15 MG/DL — SIGNIFICANT CHANGE UP (ref 0.5–1.3)
BLOOD UR QL VISUAL: HIGH
BUN SERPL-MCNC: 14 MG/DL — SIGNIFICANT CHANGE UP (ref 7–23)
CALCIUM SERPL-MCNC: 9.5 MG/DL — SIGNIFICANT CHANGE UP (ref 8.4–10.5)
CHLORIDE BLDV-SCNC: 108 MMOL/L — SIGNIFICANT CHANGE UP (ref 96–108)
CHLORIDE SERPL-SCNC: 104 MMOL/L — SIGNIFICANT CHANGE UP (ref 98–107)
CO2 SERPL-SCNC: 26 MMOL/L — SIGNIFICANT CHANGE UP (ref 22–31)
COLOR SPEC: YELLOW — SIGNIFICANT CHANGE UP
CREAT SERPL-MCNC: 1.11 MG/DL — SIGNIFICANT CHANGE UP (ref 0.5–1.3)
EOSINOPHIL # BLD AUTO: 0.15 K/UL — SIGNIFICANT CHANGE UP (ref 0–0.5)
EOSINOPHIL NFR BLD AUTO: 1.9 % — SIGNIFICANT CHANGE UP (ref 0–6)
GAS PNL BLDV: 139 MMOL/L — SIGNIFICANT CHANGE UP (ref 136–146)
GLUCOSE BLDV-MCNC: 128 — HIGH (ref 70–99)
GLUCOSE SERPL-MCNC: 123 MG/DL — HIGH (ref 70–99)
GLUCOSE UR-MCNC: NEGATIVE — SIGNIFICANT CHANGE UP
HCO3 BLDV-SCNC: 28 MMOL/L — HIGH (ref 20–27)
HCT VFR BLD CALC: 35.8 % — SIGNIFICANT CHANGE UP (ref 34.5–45)
HCT VFR BLDV CALC: 36.1 % — SIGNIFICANT CHANGE UP (ref 34.5–45)
HGB BLD-MCNC: 11.7 G/DL — SIGNIFICANT CHANGE UP (ref 11.5–15.5)
HGB BLDV-MCNC: 11.7 G/DL — SIGNIFICANT CHANGE UP (ref 11.5–15.5)
IMM GRANULOCYTES # BLD AUTO: 0.03 # — SIGNIFICANT CHANGE UP
IMM GRANULOCYTES NFR BLD AUTO: 0.4 % — SIGNIFICANT CHANGE UP (ref 0–1.5)
KETONES UR-MCNC: NEGATIVE — SIGNIFICANT CHANGE UP
LACTATE BLDV-MCNC: 1.5 MMOL/L — SIGNIFICANT CHANGE UP (ref 0.5–2)
LEUKOCYTE ESTERASE UR-ACNC: HIGH
LYMPHOCYTES # BLD AUTO: 2.67 K/UL — SIGNIFICANT CHANGE UP (ref 1–3.3)
LYMPHOCYTES # BLD AUTO: 34.3 % — SIGNIFICANT CHANGE UP (ref 13–44)
MCHC RBC-ENTMCNC: 30.1 PG — SIGNIFICANT CHANGE UP (ref 27–34)
MCHC RBC-ENTMCNC: 32.7 % — SIGNIFICANT CHANGE UP (ref 32–36)
MCV RBC AUTO: 92 FL — SIGNIFICANT CHANGE UP (ref 80–100)
MONOCYTES # BLD AUTO: 0.65 K/UL — SIGNIFICANT CHANGE UP (ref 0–0.9)
MONOCYTES NFR BLD AUTO: 8.3 % — SIGNIFICANT CHANGE UP (ref 2–14)
NEUTROPHILS # BLD AUTO: 4.25 K/UL — SIGNIFICANT CHANGE UP (ref 1.8–7.4)
NEUTROPHILS NFR BLD AUTO: 54.6 % — SIGNIFICANT CHANGE UP (ref 43–77)
NITRITE UR-MCNC: NEGATIVE — SIGNIFICANT CHANGE UP
NRBC # FLD: 0 — SIGNIFICANT CHANGE UP
PCO2 BLDV: 57 MMHG — HIGH (ref 41–51)
PH BLDV: 7.36 PH — SIGNIFICANT CHANGE UP (ref 7.32–7.43)
PH UR: 6 — SIGNIFICANT CHANGE UP (ref 4.6–8)
PLATELET # BLD AUTO: 282 K/UL — SIGNIFICANT CHANGE UP (ref 150–400)
PMV BLD: 10.6 FL — SIGNIFICANT CHANGE UP (ref 7–13)
PO2 BLDV: 29 MMHG — LOW (ref 35–40)
POTASSIUM BLDV-SCNC: 3.9 MMOL/L — SIGNIFICANT CHANGE UP (ref 3.4–4.5)
POTASSIUM SERPL-MCNC: 4 MMOL/L — SIGNIFICANT CHANGE UP (ref 3.5–5.3)
POTASSIUM SERPL-SCNC: 4 MMOL/L — SIGNIFICANT CHANGE UP (ref 3.5–5.3)
PROT SERPL-MCNC: 6.8 G/DL — SIGNIFICANT CHANGE UP (ref 6–8.3)
PROT UR-MCNC: 20 MG/DL — SIGNIFICANT CHANGE UP
RBC # BLD: 3.89 M/UL — SIGNIFICANT CHANGE UP (ref 3.8–5.2)
RBC # FLD: 13.6 % — SIGNIFICANT CHANGE UP (ref 10.3–14.5)
RBC CASTS # UR COMP ASSIST: SIGNIFICANT CHANGE UP (ref 0–?)
SAO2 % BLDV: 47.8 % — LOW (ref 60–85)
SODIUM SERPL-SCNC: 143 MMOL/L — SIGNIFICANT CHANGE UP (ref 135–145)
SP GR SPEC: 1.02 — SIGNIFICANT CHANGE UP (ref 1–1.04)
SQUAMOUS # UR AUTO: SIGNIFICANT CHANGE UP
UROBILINOGEN FLD QL: NORMAL MG/DL — SIGNIFICANT CHANGE UP
WBC # BLD: 7.79 K/UL — SIGNIFICANT CHANGE UP (ref 3.8–10.5)
WBC # FLD AUTO: 7.79 K/UL — SIGNIFICANT CHANGE UP (ref 3.8–10.5)
WBC UR QL: SIGNIFICANT CHANGE UP (ref 0–?)

## 2018-08-13 PROCEDURE — 74177 CT ABD & PELVIS W/CONTRAST: CPT | Mod: 26

## 2018-08-13 PROCEDURE — 99284 EMERGENCY DEPT VISIT MOD MDM: CPT | Mod: GC

## 2018-08-13 RX ORDER — SODIUM CHLORIDE 9 MG/ML
1000 INJECTION INTRAMUSCULAR; INTRAVENOUS; SUBCUTANEOUS ONCE
Qty: 0 | Refills: 0 | Status: COMPLETED | OUTPATIENT
Start: 2018-08-13 | End: 2018-08-13

## 2018-08-13 RX ADMIN — SODIUM CHLORIDE 333.33 MILLILITER(S): 9 INJECTION INTRAMUSCULAR; INTRAVENOUS; SUBCUTANEOUS at 11:10

## 2018-08-13 NOTE — ED ADULT NURSE NOTE - OBJECTIVE STATEMENT
Patient reports abd pain in the LLQ for 2 weeks, non-radiating. Patient reports nausea, no vomiting, and decreased PO intake. Patient denies any abd surgery in the past, PMH of DM. Patient currently appears NAD, VSS. Patient denies any CP or SOB. Patient's labs drawn, pending further evaluation.

## 2018-08-13 NOTE — ED PROVIDER NOTE - OBJECTIVE STATEMENT
Pt c/o 2 weeks of generalized abd pain  no fever  change in bowel habits or vomiting   Notes some nausea   Pain may be related to food at times but not consistently   She denies h/o this pain in the past  She saw her PMD  but was told to come here if the pain became worse  She denies any bleeding   Was treated recently for UTI with resolution of sxs of dysuria Pt c/o 2 weeks of generalized abd pain  no fever  change in bowel habits or vomiting   Notes some nausea   Pain may be related to food at times but not consistently   She denies h/o this pain in the past  She saw her PMD  but was told to come here if the pain became worse  She denies any bleeding   Was treated recently for UTI with resolution of sxs of dysuria  ([facility])

## 2018-08-13 NOTE — ED ADULT NURSE NOTE - CHPI ED NUR SYMPTOMS NEG
no abdominal distension/no blood in stool/no burning urination/no fever/no dysuria/no hematuria/no vomiting/no chills/no diarrhea

## 2018-08-13 NOTE — ED PROVIDER NOTE - ABDOMINAL EXAM
soft/abd soft  not clinically distended  mild tenderness  left mid and low abd  no gurading or rebound  no CVAT

## 2018-08-13 NOTE — ED ADULT TRIAGE NOTE - CHIEF COMPLAINT QUOTE
Co intermittent mid abdominal pain x 1 week. Co nausea, denies vomiting or diarrhea. Went to pcp and is scheduled for an ultrasound on Wednesday, however states the pain is becoming worse.

## 2018-08-13 NOTE — ED PROVIDER NOTE - ATTENDING CONTRIBUTION TO CARE
[facility]  pt c/o 2 weeks of intermittent generalized abd pain  exam  with mild lt sided tenderness  no assoc fever or change in bowels   with age and underlying diabetes  CT performed   no acute findings  pt to follow up with PMD for further evaluation

## 2018-08-13 NOTE — ED PROVIDER NOTE - PROGRESS NOTE DETAILS
She is taking Metformin, she took it this morning. Due to the concern of her kidney function, contrast cannot be used. Plane CT abd scan is ordered. She is taking Metformin, she took it this morning. Due to the concern of her kidney function, after taking contrast CT abd scan, Metformin needs to be halted for 2 days. Pt was given instruction. PGY1/MD Yolanda. She is taking Metformin, she took it this morning. Due to the concern of her kidney function, after taking contrast CT abd scan, Metformin needs to be halted for 2 days. Pt was given instruction. ADOLPH/MD Yolanda. The result of CT scan was given. No solitary lesion was defined.

## 2018-08-13 NOTE — ED PROVIDER NOTE - MEDICAL DECISION MAKING DETAILS
PGY1/MD Yolanda. The result of CT scan was given to the pt. She has an appointment to see PCP. Endoscopy may be considered to identify the cause of epigastric pain but pt needs to discuss with her PCP.  Patient was given strict return and follow up precautions (hematemesis, worsening abd pain). The patient has been informed of all concerning signs and symptoms to return to Emergency Department, the necessity to follow up with PMD provided within 2-3 days was explained, and the patient reports understanding of above with capacity and insight. PGY1/MD Yolanda. The result of CT scan was given to the pt. Metformin needs to be halted for 2 days. The instruction was given to the patient. She has an appointment to see PCP. Endoscopy may be considered to identify the cause of epigastric pain but pt needs to discuss with her PCP.  Patient was given strict return and follow up precautions (hematemesis, worsening abd pain). The patient has been informed of all concerning signs and symptoms to return to Emergency Department, the necessity to follow up with PMD provided within 2-3 days was explained, and the patient reports understanding of above with capacity and insight.

## 2018-08-15 DIAGNOSIS — Z71.89 OTHER SPECIFIED COUNSELING: ICD-10-CM

## 2018-09-06 ENCOUNTER — EMERGENCY (EMERGENCY)
Facility: HOSPITAL | Age: 73
LOS: 1 days | Discharge: HOME | End: 2018-09-06
Attending: EMERGENCY MEDICINE | Admitting: EMERGENCY MEDICINE
Payer: MEDICARE

## 2018-09-06 VITALS
SYSTOLIC BLOOD PRESSURE: 110 MMHG | DIASTOLIC BLOOD PRESSURE: 60 MMHG | HEART RATE: 85 BPM | RESPIRATION RATE: 18 BRPM | TEMPERATURE: 99 F

## 2018-09-06 DIAGNOSIS — Z90.710 ACQUIRED ABSENCE OF BOTH CERVIX AND UTERUS: Chronic | ICD-10-CM

## 2018-09-06 DIAGNOSIS — Z98.890 OTHER SPECIFIED POSTPROCEDURAL STATES: Chronic | ICD-10-CM

## 2018-09-06 LAB
ALBUMIN SERPL ELPH-MCNC: 3.7 G/DL — SIGNIFICANT CHANGE UP (ref 3.3–5)
ALP SERPL-CCNC: 68 U/L — SIGNIFICANT CHANGE UP (ref 40–120)
ALT FLD-CCNC: 11 U/L — SIGNIFICANT CHANGE UP (ref 4–33)
AST SERPL-CCNC: 16 U/L — SIGNIFICANT CHANGE UP (ref 4–32)
BASOPHILS # BLD AUTO: 0.02 K/UL — SIGNIFICANT CHANGE UP (ref 0–0.2)
BASOPHILS NFR BLD AUTO: 0.2 % — SIGNIFICANT CHANGE UP (ref 0–2)
BILIRUB SERPL-MCNC: 0.3 MG/DL — SIGNIFICANT CHANGE UP (ref 0.2–1.2)
BUN SERPL-MCNC: 15 MG/DL — SIGNIFICANT CHANGE UP (ref 7–23)
CALCIUM SERPL-MCNC: 9.7 MG/DL — SIGNIFICANT CHANGE UP (ref 8.4–10.5)
CHLORIDE SERPL-SCNC: 102 MMOL/L — SIGNIFICANT CHANGE UP (ref 98–107)
CO2 SERPL-SCNC: 27 MMOL/L — SIGNIFICANT CHANGE UP (ref 22–31)
CREAT SERPL-MCNC: 1.14 MG/DL — SIGNIFICANT CHANGE UP (ref 0.5–1.3)
EOSINOPHIL # BLD AUTO: 0.03 K/UL — SIGNIFICANT CHANGE UP (ref 0–0.5)
EOSINOPHIL NFR BLD AUTO: 0.3 % — SIGNIFICANT CHANGE UP (ref 0–6)
GLUCOSE SERPL-MCNC: 159 MG/DL — HIGH (ref 70–99)
HCT VFR BLD CALC: 35.6 % — SIGNIFICANT CHANGE UP (ref 34.5–45)
HGB BLD-MCNC: 11.5 G/DL — SIGNIFICANT CHANGE UP (ref 11.5–15.5)
IMM GRANULOCYTES # BLD AUTO: 0.03 # — SIGNIFICANT CHANGE UP
IMM GRANULOCYTES NFR BLD AUTO: 0.3 % — SIGNIFICANT CHANGE UP (ref 0–1.5)
LYMPHOCYTES # BLD AUTO: 2.29 K/UL — SIGNIFICANT CHANGE UP (ref 1–3.3)
LYMPHOCYTES # BLD AUTO: 25 % — SIGNIFICANT CHANGE UP (ref 13–44)
MCHC RBC-ENTMCNC: 29.1 PG — SIGNIFICANT CHANGE UP (ref 27–34)
MCHC RBC-ENTMCNC: 32.3 % — SIGNIFICANT CHANGE UP (ref 32–36)
MCV RBC AUTO: 90.1 FL — SIGNIFICANT CHANGE UP (ref 80–100)
MONOCYTES # BLD AUTO: 0.73 K/UL — SIGNIFICANT CHANGE UP (ref 0–0.9)
MONOCYTES NFR BLD AUTO: 8 % — SIGNIFICANT CHANGE UP (ref 2–14)
NEUTROPHILS # BLD AUTO: 6.05 K/UL — SIGNIFICANT CHANGE UP (ref 1.8–7.4)
NEUTROPHILS NFR BLD AUTO: 66.2 % — SIGNIFICANT CHANGE UP (ref 43–77)
NRBC # FLD: 0 — SIGNIFICANT CHANGE UP
PLATELET # BLD AUTO: 239 K/UL — SIGNIFICANT CHANGE UP (ref 150–400)
PMV BLD: 10.5 FL — SIGNIFICANT CHANGE UP (ref 7–13)
POTASSIUM SERPL-MCNC: 3.5 MMOL/L — SIGNIFICANT CHANGE UP (ref 3.5–5.3)
POTASSIUM SERPL-SCNC: 3.5 MMOL/L — SIGNIFICANT CHANGE UP (ref 3.5–5.3)
PROT SERPL-MCNC: 7.4 G/DL — SIGNIFICANT CHANGE UP (ref 6–8.3)
RBC # BLD: 3.95 M/UL — SIGNIFICANT CHANGE UP (ref 3.8–5.2)
RBC # FLD: 13.8 % — SIGNIFICANT CHANGE UP (ref 10.3–14.5)
SODIUM SERPL-SCNC: 141 MMOL/L — SIGNIFICANT CHANGE UP (ref 135–145)
WBC # BLD: 9.15 K/UL — SIGNIFICANT CHANGE UP (ref 3.8–10.5)
WBC # FLD AUTO: 9.15 K/UL — SIGNIFICANT CHANGE UP (ref 3.8–10.5)

## 2018-09-06 PROCEDURE — 93971 EXTREMITY STUDY: CPT | Mod: 26

## 2018-09-06 PROCEDURE — 99284 EMERGENCY DEPT VISIT MOD MDM: CPT | Mod: 25

## 2018-09-06 PROCEDURE — 73564 X-RAY EXAM KNEE 4 OR MORE: CPT | Mod: 26,LT

## 2018-09-06 RX ADMIN — Medication 500 MILLIGRAM(S): at 11:32

## 2018-09-06 NOTE — ED PROVIDER NOTE - PROGRESS NOTE DETAILS
THEODORE Pardo- Pt feeling better after ER stay, xray neg for acute pathology. US neg for dvt. stable for dc.

## 2018-09-06 NOTE — ED PROCEDURE NOTE - PROCEDURE ADDITIONAL DETAILS
Focused ED Ultrasound of (   bilateral  )  lower extremity  [id number]    In both grey scale and color doppler from both common femoral vein into superficial and deep femoral veins, and again at the popliteal veins inferiorly past  the trifurcations:  full compression at all sites, with normal doppler flow.  No visualized thrombus.  Recommend repeat ultrasound in 5 - 7 days.     Impression: no proximal DVT (  bilateral   ) lower extremity.  Technically difficulty L tibial veins, no obvious DVT seen.  Camilos

## 2018-09-06 NOTE — ED PROVIDER NOTE - MEDICAL DECISION MAKING DETAILS
XR, US and labs given swelling. Pain control. B/l leg pain and swelling. No CP/SOB. No fever, no rash, no evidence of infection. Plan for XR, US and labs given swelling. Pain control.

## 2018-09-06 NOTE — ED ADULT TRIAGE NOTE - CHIEF COMPLAINT QUOTE
Pt c/o bilateral knee pain --lt worse than right--pt c/o knee pain which is slowly gettting progressively worse especially last few days

## 2018-09-06 NOTE — ED PROVIDER NOTE - ATTENDING CONTRIBUTION TO CARE
I performed the initial face to face bedside interview with this patient regarding history of present illness, review of symptoms and past medical, social and family history.  I completed an independent physical examination.  I was the initial provider who evaluated this patient.  The history, review of symptoms and examination was documented by the scribe in my presence and I attest to the accuracy of the documentation.  I have signed out the follow up of any pending tests (i.e. labs, radiological studies) to the PA.  I have discussed the patient’s plan of care and disposition with the PA. ÁLVARO Hardin MD

## 2018-09-06 NOTE — ED ADULT NURSE NOTE - OBJECTIVE STATEMENT
Pt received a&ox3, c/o acute on chronic bilateral knee pain worse on left side x 2 days w bilateral lower extremity swelling, pt denies chest pain/sob, breathing even and unlabored, denies recent travel/cardiac hx, appears to be in NAD, 20 gauge IV placed in right ac, labs drawn and sent, pt taken to US.

## 2018-09-06 NOTE — ED PROVIDER NOTE - OBJECTIVE STATEMENT
72 YO F with significant PMH of  HTN, DM , HLD, presents with b/l knee pain xs2 days. pain when walks. Both legs hurt but left knee more. The pain started about 2 days ago but has worsened. Pt notes swelling of both her legs since the pain has started. Pt takes tramadol for left shoulder. 1 every 6 hours takes 2 times per day, took this morning for knee pain. Does not take any other pain for medication. Pt does not have ortho doc for knees just for shoulder. Denies fever, nausea, vomit, diarrhea. Pt ambulates with cane, but hardly due to pain. No pain while sitting. No further complaints. 74 YO F with significant PMH of  HTN, DM , HLD, presents with b/l knee pain x2 days. pain when walks. Both legs hurt but left knee more. The pain started about 2 days ago but has worsened. Pt notes swelling of both her legs since the pain has started. Pt takes tramadol for left shoulder. 1 every 6 hours takes 2 times per day, took this morning for knee pain. Does not take any other pain for medication. Pt does not have ortho doc for knees just for shoulder. Denies fever, nausea, vomit, diarrhea. Pt ambulates with cane, but hardly due to pain. No pain while sitting. No further complaints.

## 2018-09-06 NOTE — ED ADULT NURSE NOTE - NSIMPLEMENTINTERV_GEN_ALL_ED
Implemented All Fall Risk Interventions:  [place] to call system. Call bell, personal items and telephone within reach. Instruct patient to call for assistance. Room bathroom lighting operational. Non-slip footwear when patient is off stretcher. Physically safe environment: no spills, clutter or unnecessary equipment. Stretcher in lowest position, wheels locked, appropriate side rails in place. Provide visual cue, wrist band, yellow gown, etc. Monitor gait and stability. Monitor for mental status changes and reorient to person, place, and time. Review medications for side effects contributing to fall risk. Reinforce activity limits and safety measures with patient and family.

## 2018-12-04 ENCOUNTER — NON-APPOINTMENT (OUTPATIENT)
Age: 73
End: 2018-12-04

## 2018-12-04 ENCOUNTER — APPOINTMENT (OUTPATIENT)
Dept: CARDIOLOGY | Facility: CLINIC | Age: 73
End: 2018-12-04
Payer: MEDICARE

## 2018-12-04 VITALS — SYSTOLIC BLOOD PRESSURE: 140 MMHG | DIASTOLIC BLOOD PRESSURE: 80 MMHG

## 2018-12-04 VITALS
HEIGHT: 62 IN | HEART RATE: 65 BPM | OXYGEN SATURATION: 98 % | SYSTOLIC BLOOD PRESSURE: 148 MMHG | DIASTOLIC BLOOD PRESSURE: 78 MMHG | BODY MASS INDEX: 34.96 KG/M2 | WEIGHT: 190 LBS

## 2018-12-04 PROCEDURE — 93000 ELECTROCARDIOGRAM COMPLETE: CPT

## 2018-12-04 PROCEDURE — 99214 OFFICE O/P EST MOD 30 MIN: CPT

## 2018-12-04 NOTE — PHYSICAL EXAM
[General Appearance - Well Developed] : well developed [Normal Appearance] : normal appearance [Well Groomed] : well groomed [General Appearance - Well Nourished] : well nourished [No Deformities] : no deformities [General Appearance - In No Acute Distress] : no acute distress [Normal Conjunctiva] : the conjunctiva exhibited no abnormalities [Eyelids - No Xanthelasma] : the eyelids demonstrated no xanthelasmas [Normal Oral Mucosa] : normal oral mucosa [No Oral Pallor] : no oral pallor [Normal Oropharynx] : normal oropharynx [Respiration, Rhythm And Depth] : normal respiratory rhythm and effort [Auscultation Breath Sounds / Voice Sounds] : lungs were clear to auscultation bilaterally [Heart Rate And Rhythm] : heart rate and rhythm were normal [Heart Sounds] : normal S1 and S2 [Arterial Pulses Normal] : the arterial pulses were normal [Bowel Sounds] : normal bowel sounds [Abdomen Soft] : soft [Abdomen Tenderness] : non-tender [Abnormal Walk] : normal gait [Nail Clubbing] : no clubbing of the fingernails [Cyanosis, Localized] : no localized cyanosis [Skin Color & Pigmentation] : normal skin color and pigmentation [Skin Turgor] : normal skin turgor [] : no rash [Oriented To Time, Place, And Person] : oriented to person, place, and time [Impaired Insight] : insight and judgment were intact [No Anxiety] : not feeling anxious [FreeTextEntry1] : trace bilateral leg edema.

## 2018-12-04 NOTE — HISTORY OF PRESENT ILLNESS
[FreeTextEntry1] : Ms. Lianna Patel is a 73 year old female with history of hypertension, hypercholesterolemia, diabetes and nonobstructive CAD comes for follow up visit. Denies any chest pain or shortness of breath. No palpitations. Compliant to medications and diet. Follows up with PCP. Follows up with PCP. PHysically active.

## 2018-12-04 NOTE — REVIEW OF SYSTEMS
[Headache] : headache [Joint Pain] : joint pain [Negative] : Endocrine [Fever] : no fever [Chills] : no chills [Feeling Fatigued] : not feeling fatigued [Muscle Cramps] : no muscle cramps [Limb Weakness (Paresis)] : no limb weakness [Easy Bleeding] : no tendency for easy bleeding [Easy Bruising] : no tendency for easy bruising

## 2018-12-04 NOTE — DISCUSSION/SUMMARY
[FreeTextEntry1] : In a summary Lianna Funk is an elderly female with hypertension, controlled usually. Continue current medications and 2 gm sodium diet. Hypercholesterolemia, on statins and low cholesterol diet. LDL goal less than 100 g/dL. Diabetes, on medications and low Crab diet. Nonobstructive CAD, on Aspirin. Healthy lifestyle. Follow up in 4 months.

## 2019-03-05 ENCOUNTER — APPOINTMENT (OUTPATIENT)
Dept: CARDIOLOGY | Facility: CLINIC | Age: 74
End: 2019-03-05
Payer: MEDICARE

## 2019-03-05 ENCOUNTER — NON-APPOINTMENT (OUTPATIENT)
Age: 74
End: 2019-03-05

## 2019-03-05 VITALS
DIASTOLIC BLOOD PRESSURE: 82 MMHG | WEIGHT: 192 LBS | HEIGHT: 62 IN | SYSTOLIC BLOOD PRESSURE: 150 MMHG | HEART RATE: 71 BPM | BODY MASS INDEX: 35.33 KG/M2

## 2019-03-05 PROCEDURE — 93000 ELECTROCARDIOGRAM COMPLETE: CPT

## 2019-03-05 PROCEDURE — 99214 OFFICE O/P EST MOD 30 MIN: CPT

## 2019-03-05 RX ORDER — LOSARTAN POTASSIUM AND HYDROCHLOROTHIAZIDE 25; 100 MG/1; MG/1
100-25 TABLET ORAL DAILY
Refills: 0 | Status: ACTIVE | COMMUNITY

## 2019-03-05 NOTE — DISCUSSION/SUMMARY
[FreeTextEntry1] : In a summary Lianna Funk is an elderly female with hypertension, monitors BP at home. Usually controlled. Continue current medications and cut down on salt intake. Hypercholesterolemia, on statins and low cholesterol diet. LDL goal less than 70 g/dL. Diabetes, on medications and low Carb diet. Nonobstructive CAD, continue aspirin and risk factor control. Healthy lifestyle. Follow up in 3 months.

## 2019-03-05 NOTE — REVIEW OF SYSTEMS
[Lower Ext Edema] : lower extremity edema [Joint Pain] : joint pain [Negative] : Endocrine [Fever] : no fever [Headache] : no headache [Chills] : no chills [Feeling Fatigued] : not feeling fatigued [Shortness Of Breath] : no shortness of breath [Chest Pain] : no chest pain [Palpitations] : no palpitations [Muscle Cramps] : no muscle cramps [Limb Weakness (Paresis)] : no limb weakness [Easy Bleeding] : no tendency for easy bleeding [Easy Bruising] : no tendency for easy bruising

## 2019-03-05 NOTE — HISTORY OF PRESENT ILLNESS
[FreeTextEntry1] : Lianna Funk is a 74 year old female with history of hypertension, hypercholesterolemia, diabetes and nonobstructive CAD comes for follow up visit. Denies any chest pain or palpitations. No shortness of breath on exertion. Compliant to medications and diet. Follows up with PCP.

## 2019-06-18 ENCOUNTER — APPOINTMENT (OUTPATIENT)
Dept: CARDIOLOGY | Facility: CLINIC | Age: 74
End: 2019-06-18
Payer: MEDICARE

## 2019-06-18 VITALS
HEART RATE: 73 BPM | BODY MASS INDEX: 36.07 KG/M2 | SYSTOLIC BLOOD PRESSURE: 114 MMHG | WEIGHT: 196 LBS | HEIGHT: 62 IN | OXYGEN SATURATION: 97 % | DIASTOLIC BLOOD PRESSURE: 72 MMHG

## 2019-06-18 PROCEDURE — 99214 OFFICE O/P EST MOD 30 MIN: CPT

## 2019-06-18 RX ORDER — AMLODIPINE BESYLATE 5 MG/1
5 TABLET ORAL EVERY OTHER DAY
Refills: 0 | Status: ACTIVE | COMMUNITY

## 2019-06-18 NOTE — DISCUSSION/SUMMARY
[FreeTextEntry1] : In a summary Lianna Funk is an elderly female with hypertension, controlled. Bilateral leg edema may be secondary to Amlodipine which is changed to every other day. if BP is controlled will discontinue Amlodipine. Cut down on salt intake and elevate the legs. Hypercholesterolemia, on statins and low cholesterol diet. Diabetes, on medications and low Carb diet. Non obstructive CAD, on Aspirin. Follow up in 3 months.

## 2019-06-18 NOTE — PHYSICAL EXAM
[General Appearance - Well Developed] : well developed [Normal Appearance] : normal appearance [Well Groomed] : well groomed [General Appearance - Well Nourished] : well nourished [No Deformities] : no deformities [General Appearance - In No Acute Distress] : no acute distress [Normal Conjunctiva] : the conjunctiva exhibited no abnormalities [Eyelids - No Xanthelasma] : the eyelids demonstrated no xanthelasmas [Normal Oral Mucosa] : normal oral mucosa [No Oral Pallor] : no oral pallor [Normal Oropharynx] : normal oropharynx [Respiration, Rhythm And Depth] : normal respiratory rhythm and effort [Auscultation Breath Sounds / Voice Sounds] : lungs were clear to auscultation bilaterally [Heart Rate And Rhythm] : heart rate and rhythm were normal [Heart Sounds] : normal S1 and S2 [Arterial Pulses Normal] : the arterial pulses were normal [Bowel Sounds] : normal bowel sounds [Abdomen Soft] : soft [Abdomen Tenderness] : non-tender [Abnormal Walk] : normal gait [Nail Clubbing] : no clubbing of the fingernails [Cyanosis, Localized] : no localized cyanosis [Skin Color & Pigmentation] : normal skin color and pigmentation [Skin Turgor] : normal skin turgor [] : no rash [Oriented To Time, Place, And Person] : oriented to person, place, and time [Impaired Insight] : insight and judgment were intact [No Anxiety] : not feeling anxious [FreeTextEntry1] : 1-2+ bilateral leg edema.

## 2019-06-18 NOTE — HISTORY OF PRESENT ILLNESS
[FreeTextEntry1] : Lianna Funk is a 74 year old female with history of hypertension, hypercholesterolemia, diabetes and non obstructive CAD comes for follow up visit. Denies any chest pain or shortness of breath. No palpitations. Increasing bilateral leg edema. Compliant to medications and diet. Follows up with PCP.

## 2019-08-26 NOTE — ED ADULT NURSE NOTE - EXTENSIONS OF SELF_ADULT
Chief Complaint   Patient presents with    Coronary Artery Disease     1. Have you been to the ER, urgent care clinic since your last visit? Hospitalized since your last visit? No    2. Have you seen or consulted any other health care providers outside of the [address] [place] [address] since your last visit? Include any pap smears or colon screening. No    3) Do you have an Advance Directive on file?  no clothes

## 2019-09-17 ENCOUNTER — APPOINTMENT (OUTPATIENT)
Dept: CARDIOLOGY | Facility: CLINIC | Age: 74
End: 2019-09-17
Payer: MEDICARE

## 2019-09-17 ENCOUNTER — NON-APPOINTMENT (OUTPATIENT)
Age: 74
End: 2019-09-17

## 2019-09-17 VITALS
HEART RATE: 65 BPM | OXYGEN SATURATION: 100 % | SYSTOLIC BLOOD PRESSURE: 146 MMHG | HEIGHT: 62 IN | BODY MASS INDEX: 35.33 KG/M2 | WEIGHT: 192 LBS | DIASTOLIC BLOOD PRESSURE: 78 MMHG

## 2019-09-17 PROCEDURE — 99214 OFFICE O/P EST MOD 30 MIN: CPT

## 2019-09-17 PROCEDURE — 93000 ELECTROCARDIOGRAM COMPLETE: CPT

## 2019-09-17 PROCEDURE — 93306 TTE W/DOPPLER COMPLETE: CPT

## 2019-09-17 NOTE — PHYSICAL EXAM
[General Appearance - Well Developed] : well developed [Normal Appearance] : normal appearance [Well Groomed] : well groomed [No Deformities] : no deformities [General Appearance - Well Nourished] : well nourished [Normal Conjunctiva] : the conjunctiva exhibited no abnormalities [General Appearance - In No Acute Distress] : no acute distress [Eyelids - No Xanthelasma] : the eyelids demonstrated no xanthelasmas [Normal Oral Mucosa] : normal oral mucosa [No Oral Pallor] : no oral pallor [Normal Oropharynx] : normal oropharynx [Respiration, Rhythm And Depth] : normal respiratory rhythm and effort [Auscultation Breath Sounds / Voice Sounds] : lungs were clear to auscultation bilaterally [Heart Rate And Rhythm] : heart rate and rhythm were normal [Heart Sounds] : normal S1 and S2 [Arterial Pulses Normal] : the arterial pulses were normal [Bowel Sounds] : normal bowel sounds [Abdomen Soft] : soft [Abdomen Tenderness] : non-tender [Abnormal Walk] : normal gait [Nail Clubbing] : no clubbing of the fingernails [Cyanosis, Localized] : no localized cyanosis [Skin Color & Pigmentation] : normal skin color and pigmentation [Skin Turgor] : normal skin turgor [] : no rash [Oriented To Time, Place, And Person] : oriented to person, place, and time [No Anxiety] : not feeling anxious [Impaired Insight] : insight and judgment were intact [FreeTextEntry1] : 1-2+ bilateral leg edema.

## 2019-09-17 NOTE — DISCUSSION/SUMMARY
[FreeTextEntry1] : In a summary Lianna Funk is an elderly female with hypertension and leg edema, may be secondary to Amlodipine. Discontinue amlodipine. Continue other medications. Monitor BP, if needed will adjust medications. 2 gm sodium diet. Hypercholesterolemia, on statins and low cholesterol diet. Diabetes, on medications and low Carb diet. Echo done showed normal LV systolic function. Healthy lifestyle. Follow up in 3 months.

## 2019-09-17 NOTE — HISTORY OF PRESENT ILLNESS
[FreeTextEntry1] : Lianna Storm is a 74 year old female with history of hypertension, hypercholesterolemia, diabetes and  leg edema comes for follow up visit. Denies any chest pain or palpitations. No shortness of breath on exertion. Leg edema persists. Compliant to medications and diet. Follows up with PCP.

## 2019-09-17 NOTE — REVIEW OF SYSTEMS
[Lower Ext Edema] : lower extremity edema [Joint Pain] : joint pain [Negative] : Psychiatric [Fever] : no fever [Headache] : no headache [Chills] : no chills [Feeling Fatigued] : not feeling fatigued [Shortness Of Breath] : no shortness of breath [Chest Pain] : no chest pain [Palpitations] : no palpitations [Muscle Cramps] : no muscle cramps [Limb Weakness (Paresis)] : no limb weakness [Easy Bleeding] : no tendency for easy bleeding [Easy Bruising] : no tendency for easy bruising

## 2019-09-26 NOTE — ED ADULT NURSE NOTE - NSIMPLEMENTINTERV_GEN_ALL_ED
assisted living facility
Implemented All Universal Safety Interventions:  [place] to call system. Call bell, personal items and telephone within reach. Instruct patient to call for assistance. Room bathroom lighting operational. Non-slip footwear when patient is off stretcher. Physically safe environment: no spills, clutter or unnecessary equipment. Stretcher in lowest position, wheels locked, appropriate side rails in place.

## 2019-12-19 ENCOUNTER — NON-APPOINTMENT (OUTPATIENT)
Age: 74
End: 2019-12-19

## 2019-12-19 ENCOUNTER — APPOINTMENT (OUTPATIENT)
Dept: CARDIOLOGY | Facility: CLINIC | Age: 74
End: 2019-12-19
Payer: MEDICARE

## 2019-12-19 VITALS
OXYGEN SATURATION: 97 % | HEIGHT: 62 IN | SYSTOLIC BLOOD PRESSURE: 144 MMHG | BODY MASS INDEX: 34.6 KG/M2 | HEART RATE: 73 BPM | WEIGHT: 188 LBS | DIASTOLIC BLOOD PRESSURE: 80 MMHG

## 2019-12-19 PROCEDURE — 93000 ELECTROCARDIOGRAM COMPLETE: CPT

## 2019-12-19 PROCEDURE — 99214 OFFICE O/P EST MOD 30 MIN: CPT

## 2019-12-19 NOTE — HISTORY OF PRESENT ILLNESS
[FreeTextEntry1] : Lianna Funk is a 74 year old female with history of hypertension, hypercholesterolemia and diabetes comes for follow up visit. Denies any chest pain or palpitations. No shortness of breath on exertion. Compliant to medications and diet. Compliant to medications and diet. Follows up with PCP.

## 2019-12-19 NOTE — DISCUSSION/SUMMARY
[FreeTextEntry1] : In a summary Lianna Funk is an elderly female with hypertension, controlled. Continue current medications and 2 gm sodium diet. Hypercholesterolemia, on statins and low cholesterol diet. Diabetes, on medications and low Carb diet. Healthy lifestyle. Follow up in 4 months.

## 2019-12-19 NOTE — PHYSICAL EXAM
[General Appearance - Well Developed] : well developed [Normal Appearance] : normal appearance [Well Groomed] : well groomed [No Deformities] : no deformities [General Appearance - Well Nourished] : well nourished [General Appearance - In No Acute Distress] : no acute distress [Normal Conjunctiva] : the conjunctiva exhibited no abnormalities [Eyelids - No Xanthelasma] : the eyelids demonstrated no xanthelasmas [Normal Oral Mucosa] : normal oral mucosa [Normal Oropharynx] : normal oropharynx [No Oral Pallor] : no oral pallor [Auscultation Breath Sounds / Voice Sounds] : lungs were clear to auscultation bilaterally [Respiration, Rhythm And Depth] : normal respiratory rhythm and effort [Heart Sounds] : normal S1 and S2 [Heart Rate And Rhythm] : heart rate and rhythm were normal [Arterial Pulses Normal] : the arterial pulses were normal [Bowel Sounds] : normal bowel sounds [Abdomen Soft] : soft [Abnormal Walk] : normal gait [Abdomen Tenderness] : non-tender [Cyanosis, Localized] : no localized cyanosis [Nail Clubbing] : no clubbing of the fingernails [] : no rash [Skin Turgor] : normal skin turgor [Skin Color & Pigmentation] : normal skin color and pigmentation [Oriented To Time, Place, And Person] : oriented to person, place, and time [No Anxiety] : not feeling anxious [Impaired Insight] : insight and judgment were intact [FreeTextEntry1] : 1+ bilateral leg edema.

## 2019-12-19 NOTE — REVIEW OF SYSTEMS
[Lower Ext Edema] : lower extremity edema [Joint Pain] : joint pain [Negative] : Endocrine [Fever] : no fever [Headache] : no headache [Chills] : no chills [Feeling Fatigued] : not feeling fatigued [Chest Pain] : no chest pain [Shortness Of Breath] : no shortness of breath [Palpitations] : no palpitations [Muscle Cramps] : no muscle cramps [Easy Bleeding] : no tendency for easy bleeding [Limb Weakness (Paresis)] : no limb weakness [Easy Bruising] : no tendency for easy bruising

## 2020-04-16 ENCOUNTER — APPOINTMENT (OUTPATIENT)
Dept: CARDIOLOGY | Facility: CLINIC | Age: 75
End: 2020-04-16

## 2020-05-12 ENCOUNTER — APPOINTMENT (OUTPATIENT)
Dept: CARDIOLOGY | Facility: CLINIC | Age: 75
End: 2020-05-12
Payer: MEDICARE

## 2020-05-12 ENCOUNTER — NON-APPOINTMENT (OUTPATIENT)
Age: 75
End: 2020-05-12

## 2020-05-12 VITALS
OXYGEN SATURATION: 98 % | BODY MASS INDEX: 33.13 KG/M2 | DIASTOLIC BLOOD PRESSURE: 68 MMHG | HEIGHT: 62 IN | SYSTOLIC BLOOD PRESSURE: 130 MMHG | WEIGHT: 180 LBS | HEART RATE: 70 BPM

## 2020-05-12 PROCEDURE — 99214 OFFICE O/P EST MOD 30 MIN: CPT

## 2020-05-12 PROCEDURE — 93000 ELECTROCARDIOGRAM COMPLETE: CPT

## 2020-05-12 NOTE — PHYSICAL EXAM
[General Appearance - Well Developed] : well developed [Normal Appearance] : normal appearance [Well Groomed] : well groomed [General Appearance - Well Nourished] : well nourished [No Deformities] : no deformities [Normal Conjunctiva] : the conjunctiva exhibited no abnormalities [General Appearance - In No Acute Distress] : no acute distress [Eyelids - No Xanthelasma] : the eyelids demonstrated no xanthelasmas [Normal Oral Mucosa] : normal oral mucosa [No Oral Pallor] : no oral pallor [Normal Oropharynx] : normal oropharynx [Auscultation Breath Sounds / Voice Sounds] : lungs were clear to auscultation bilaterally [Respiration, Rhythm And Depth] : normal respiratory rhythm and effort [Heart Rate And Rhythm] : heart rate and rhythm were normal [Heart Sounds] : normal S1 and S2 [Arterial Pulses Normal] : the arterial pulses were normal [Bowel Sounds] : normal bowel sounds [Abdomen Soft] : soft [Abdomen Tenderness] : non-tender [Abnormal Walk] : normal gait [Nail Clubbing] : no clubbing of the fingernails [Cyanosis, Localized] : no localized cyanosis [Skin Color & Pigmentation] : normal skin color and pigmentation [Skin Turgor] : normal skin turgor [] : no rash [Oriented To Time, Place, And Person] : oriented to person, place, and time [No Anxiety] : not feeling anxious [Impaired Insight] : insight and judgment were intact [FreeTextEntry1] : 1+ bilateral leg edema.

## 2020-05-12 NOTE — REVIEW OF SYSTEMS
[Lower Ext Edema] : lower extremity edema [Joint Pain] : joint pain [Negative] : Endocrine [Fever] : no fever [Headache] : no headache [Feeling Fatigued] : not feeling fatigued [Chills] : no chills [Shortness Of Breath] : no shortness of breath [Chest Pain] : no chest pain [Palpitations] : no palpitations [Muscle Cramps] : no muscle cramps [Limb Weakness (Paresis)] : no limb weakness [Easy Bleeding] : no tendency for easy bleeding [Easy Bruising] : no tendency for easy bruising

## 2020-05-12 NOTE — HISTORY OF PRESENT ILLNESS
[FreeTextEntry1] : Lianna Funk is a 75 year old female with history of hypertension, hypercholesterolemia and diabetes comes for follow up visit. Denies any chest pain or shortness of breath on exertion. No palpitations. Compliant to medications and diet.

## 2020-05-12 NOTE — DISCUSSION/SUMMARY
[FreeTextEntry1] : In a summary Lianna Funk is an elderly female with hypertension, controlled. Continue current medications and 2 gm sodium  diet. Hypercholesterolemia, on statins and low cholesterol diet. Diabetes, on medications and low Carb diet. Leg edema, chronic, on HCTZ. Elevate legs. Healthy life style. Follow up in 4 months.

## 2020-09-15 ENCOUNTER — NON-APPOINTMENT (OUTPATIENT)
Age: 75
End: 2020-09-15

## 2020-09-15 ENCOUNTER — APPOINTMENT (OUTPATIENT)
Dept: CARDIOLOGY | Facility: CLINIC | Age: 75
End: 2020-09-15
Payer: MEDICARE

## 2020-09-15 VITALS
OXYGEN SATURATION: 98 % | DIASTOLIC BLOOD PRESSURE: 82 MMHG | HEART RATE: 68 BPM | HEIGHT: 62 IN | TEMPERATURE: 97.5 F | SYSTOLIC BLOOD PRESSURE: 146 MMHG

## 2020-09-15 VITALS — BODY MASS INDEX: 33.65 KG/M2 | WEIGHT: 184 LBS

## 2020-09-15 PROCEDURE — 99214 OFFICE O/P EST MOD 30 MIN: CPT

## 2020-09-15 PROCEDURE — 93306 TTE W/DOPPLER COMPLETE: CPT

## 2020-09-15 PROCEDURE — 93000 ELECTROCARDIOGRAM COMPLETE: CPT

## 2020-09-15 NOTE — REVIEW OF SYSTEMS
[Lower Ext Edema] : lower extremity edema [Joint Pain] : joint pain [Negative] : Endocrine [Fever] : no fever [Headache] : no headache [Chills] : no chills [Shortness Of Breath] : no shortness of breath [Feeling Fatigued] : not feeling fatigued [Palpitations] : no palpitations [Chest Pain] : no chest pain [Limb Weakness (Paresis)] : no limb weakness [Muscle Cramps] : no muscle cramps [Easy Bleeding] : no tendency for easy bleeding [Easy Bruising] : no tendency for easy bruising

## 2020-09-15 NOTE — PHYSICAL EXAM
[General Appearance - Well Developed] : well developed [Well Groomed] : well groomed [General Appearance - Well Nourished] : well nourished [Normal Appearance] : normal appearance [Normal Conjunctiva] : the conjunctiva exhibited no abnormalities [No Deformities] : no deformities [General Appearance - In No Acute Distress] : no acute distress [Eyelids - No Xanthelasma] : the eyelids demonstrated no xanthelasmas [No Oral Pallor] : no oral pallor [Normal Oral Mucosa] : normal oral mucosa [Normal Oropharynx] : normal oropharynx [Auscultation Breath Sounds / Voice Sounds] : lungs were clear to auscultation bilaterally [Respiration, Rhythm And Depth] : normal respiratory rhythm and effort [Heart Rate And Rhythm] : heart rate and rhythm were normal [Arterial Pulses Normal] : the arterial pulses were normal [Heart Sounds] : normal S1 and S2 [Bowel Sounds] : normal bowel sounds [Abdomen Tenderness] : non-tender [Abdomen Soft] : soft [Abnormal Walk] : normal gait [Skin Color & Pigmentation] : normal skin color and pigmentation [Nail Clubbing] : no clubbing of the fingernails [Cyanosis, Localized] : no localized cyanosis [] : no rash [Oriented To Time, Place, And Person] : oriented to person, place, and time [Skin Turgor] : normal skin turgor [Impaired Insight] : insight and judgment were intact [No Anxiety] : not feeling anxious [FreeTextEntry1] : trace bilateral leg edema.

## 2020-09-15 NOTE — HISTORY OF PRESENT ILLNESS
[FreeTextEntry1] : Lianna Funk is a 75 year old female with history of hypertension, hypercholesterolemia, diabetes and nonobstructive CAD comes for follow up visit. Denies any chest pain or palpitations. No shortness of breath on exertion. Leg edema improved . Compliant to medications and diet. Follows up wit PCP.

## 2020-09-15 NOTE — REASON FOR VISIT
[Follow-Up - Clinic] : a clinic follow-up of [Hyperlipidemia] : hyperlipidemia [Hypertension] : hypertension [FreeTextEntry1] : leg edema.

## 2020-09-15 NOTE — DISCUSSION/SUMMARY
[FreeTextEntry1] : In a summary Lianna Funk is an elderly female with hypertension, controlled. Continue current medications and 2 gm sodium diet. Leg edema significantly improved. Echo done showed normal LV systolic function. Hypercholesterolemia, on statins and low cholesterol diet. Diabetes, on medications and low Crab diet. Nonobstructive CAD, on Aspirin. Continue healthy lifestyle. Follow up in 4 months.

## 2021-01-13 NOTE — H&P PST ADULT - ENDOCRINE
Is This A New Presentation, Or A Follow-Up?: Skin Lesions
Has Your Skin Lesion Been Treated?: not been treated
negative

## 2021-01-21 ENCOUNTER — APPOINTMENT (OUTPATIENT)
Dept: CARDIOLOGY | Facility: CLINIC | Age: 76
End: 2021-01-21
Payer: MEDICARE

## 2021-01-21 VITALS
WEIGHT: 191 LBS | SYSTOLIC BLOOD PRESSURE: 134 MMHG | BODY MASS INDEX: 35.15 KG/M2 | HEIGHT: 62 IN | OXYGEN SATURATION: 98 % | TEMPERATURE: 98 F | HEART RATE: 74 BPM | DIASTOLIC BLOOD PRESSURE: 78 MMHG

## 2021-01-21 DIAGNOSIS — R60.0 LOCALIZED EDEMA: ICD-10-CM

## 2021-01-21 PROCEDURE — 99214 OFFICE O/P EST MOD 30 MIN: CPT

## 2021-01-21 PROCEDURE — 99072 ADDL SUPL MATRL&STAF TM PHE: CPT

## 2021-01-21 NOTE — HISTORY OF PRESENT ILLNESS
Onset: 2 months   Location / description: Gave birth in January and did not have any stomach pain but now for the past months she is having increasing pain below the belly button. Pain comes and goes . Denies any fever, no vomiting or diarrhea. Denies any urinary symptoms. Continues breast feeding.   Precipitating Factors:   Pain Scale (1 - 10),  moderate to severe when she gets it.  Associated Symptoms:   What improves/worsens symptoms: nothing  Symptom specific medications: none  LMP : No LMP recorded. Post Partum 01/7/18  Are you pregnant or breast feeding: breast feeding  Recent Care: none    Reason for Disposition  • Abdominal pain is a chronic symptom (recurrent or ongoing AND present > 4 weeks)    Protocols used: ABDOMINAL PAIN - FEMALE-A-    Care advice reviewed and patient verbalizes understanding and agrees with plan of care.  
Regardinyr/stomach cramping  ----- Message from Sanaz Calles sent at 5/3/2018  5:46 PM CDT -----  Patient Name: Serena Barrera  Specialist or PCP:[facility]  Pregnant (If Yes, how long?):No  Symptoms:stomach cramping  Call Back #:[phone]  Is the patient’s permanent residence located in [place], [place], or a [facility]? Yes [place] [address]  Call Center Account #:[id number]    
[FreeTextEntry1] : Lianna Funk is a 76 year old female with history of hypertension, hypercholesterolemia, diabetes and non obstructive CAD comes for follow up visit. Denies any chest pain or shortness of breath. No palpitations. Compliant to medications and diet. Follows up with PCP. Physically active.

## 2021-01-21 NOTE — DISCUSSION/SUMMARY
[FreeTextEntry1] : In a summary Lianna Funk is an elderly female with hypertension, controlled. Continue current medications and 2 gm sodium diet. Hypercholesterolemia, on statins and low cholesterol diet. LDL goal less than 70 g/dL. Diabetes, on medications and low Carb diet. Nonobstructive CAD, healthy diet and risk factor control. Continue physical activity. On Aspirin. Follow up in 4 months.

## 2021-02-14 ENCOUNTER — EMERGENCY (EMERGENCY)
Facility: HOSPITAL | Age: 76
LOS: 1 days | Discharge: HOME | End: 2021-02-14
Attending: STUDENT IN AN ORGANIZED HEALTH CARE EDUCATION/TRAINING PROGRAM | Admitting: STUDENT IN AN ORGANIZED HEALTH CARE EDUCATION/TRAINING PROGRAM
Payer: MEDICARE

## 2021-02-14 VITALS
HEIGHT: 59 IN | OXYGEN SATURATION: 100 % | RESPIRATION RATE: 16 BRPM | SYSTOLIC BLOOD PRESSURE: 168 MMHG | TEMPERATURE: 98 F | DIASTOLIC BLOOD PRESSURE: 74 MMHG | HEART RATE: 72 BPM

## 2021-02-14 DIAGNOSIS — Z90.710 ACQUIRED ABSENCE OF BOTH CERVIX AND UTERUS: Chronic | ICD-10-CM

## 2021-02-14 DIAGNOSIS — Z98.890 OTHER SPECIFIED POSTPROCEDURAL STATES: Chronic | ICD-10-CM

## 2021-02-14 PROCEDURE — 99283 EMERGENCY DEPT VISIT LOW MDM: CPT | Mod: GC

## 2021-02-14 RX ORDER — LIDOCAINE 4 G/100G
1 CREAM TOPICAL
Qty: 14 | Refills: 0
Start: 2021-02-14 | End: 2021-02-20

## 2021-02-14 RX ORDER — LIDOCAINE 4 G/100G
1 CREAM TOPICAL
Qty: 7 | Refills: 0
Start: 2021-02-14 | End: 2021-02-20

## 2021-02-14 RX ORDER — LIDOCAINE 4 G/100G
1 CREAM TOPICAL ONCE
Refills: 0 | Status: COMPLETED | OUTPATIENT
Start: 2021-02-14 | End: 2021-02-14

## 2021-02-14 RX ADMIN — LIDOCAINE 1 PATCH: 4 CREAM TOPICAL at 11:06

## 2021-02-14 NOTE — ED PROVIDER NOTE - NSFOLLOWUPINSTRUCTIONS_ED_ALL_ED_FT
Back Pain    Back pain is very common in adults. The cause of back pain is rarely dangerous and the pain often gets better over time. The cause of your back pain may not be known and may include strain of muscles or ligaments, degeneration of the spinal disks, or arthritis. Occasionally the pain may radiate down your leg(s). Over-the-counter medicines to reduce pain and inflammation are often the most helpful. Stretching and remaining active frequently helps the healing process.     Take 3 extra strength tylenol every day, use lidocaine patches to the affected area, work to move your affected shoulder, meet with your primary care doctor to prescribe shoulder/neck focused physical therapy.    SEEK IMMEDIATE MEDICAL CARE IF YOU HAVE ANY OF THE FOLLOWING SYMPTOMS: bowel or bladder control problems, unusual weakness or numbness in your arms or legs, nausea or vomiting, abdominal pain, fever, dizziness/lightheadedness.

## 2021-02-14 NOTE — ED PROVIDER NOTE - CLINICAL SUMMARY MEDICAL DECISION MAKING FREE TEXT BOX
76 Y F presenting with R. neck pain. Chronic associated with radiation to neck/head. Positional component, low clinical suspicion for primary neurologic or intracranial pathology, no indication for further imaging. Likely muskuloskeletal in setting of worsening exacerbation with motion, sleeping position, association with paralumbar pain. DC with lidocaine patches are PMD followup for physiotherapy.

## 2021-02-14 NOTE — ED PROVIDER NOTE - ATTENDING CONTRIBUTION TO CARE
75 yo F past medical history htn, dm with neuropathy, hld, ckd, chronic bilateral knee and legt shoudler pain with several days of right shoulder/paraspinal neck pain. no new numbness or weakness. Has "HA" as pain radiates up to the suboccipital region. denies vision/hearing changes, back pain, abd pain, new ext pain. takes 1000mg apap bid with suma relief. exam as above. plan: increase home apap to TID, lido patch, will avoid nsaid bc of ckd, muscle relaxants bc of potential adverse effects. doubt ACS, cva, fx. pcp follow up stable for dc.

## 2021-02-14 NOTE — ED PROVIDER NOTE - NS ED ROS FT
GENERAL: No fever or chills  EYES: No change in vision  HEENT: No trouble swallowing or speaking  CARDIAC: No chest pain  PULMONARY: No cough or SOB  GI: No abdominal pain, no nausea or no vomiting, no diarrhea or constipation  : No changes in urination  SKIN: No rashes  NEURO: + headache associated with back pain  MSK: + neck pain associated with radiation from mid back  Otherwise as HPI or negative.

## 2021-02-14 NOTE — ED PROVIDER NOTE - OBJECTIVE STATEMENT
76 Y F H/O NIDDM II, HTN, HCL, B/L  knee arthritis presenting with back pain. Pain is chronic, r. sided, worse with laying on R. side, associated with pain up R. neck with muscle use. Deneis any nausea, vomiting, vision change, headache without back pain.

## 2021-02-14 NOTE — ED ADULT TRIAGE NOTE - CHIEF COMPLAINT QUOTE
pain r side of head  radiates to r neck area,r shoulder since thursday.  states pain increases mvt, reports pain mid back area since thurs. denies ch pain,diff breathing. pain r side of head  radiates to r neck area,r shoulder since thursday.  states pain increases mvt, reports pain mid back area since thurs. denies ch pain,diff breathing. fs 186

## 2021-02-14 NOTE — ED PROVIDER NOTE - PHYSICAL EXAMINATION
Physical Exam:  General: NAD, Conversive  Eyes: EOMI, Conjunctiva and sclera clear, B/L cataract surgeries  Neck: No JVD  Lungs: Clear to auscultation bilaterally, no wheeze, no rhonchi  Heart: Normal S1, S2, no murmurs  Abdomen: Soft, nontender, nondistended, no CVA tenderness  Extremities: 2+ peripheral pulses, no edema'  Back: + tenderness across paraspinal muscles on mid back to mid neck,   Psych: AAO X3  Neurologic: Non-focal, moving all extremities, walking well

## 2021-02-14 NOTE — ED PROVIDER NOTE - PATIENT PORTAL LINK FT
You can access the FollowMyHealth Patient Portal offered by [facility] by registering at the following website: [email or web address]. By joining [facility]’s FollowMyHealth portal, you will also be able to view your health information using other applications (apps) compatible with our system.

## 2021-05-13 ENCOUNTER — NON-APPOINTMENT (OUTPATIENT)
Age: 76
End: 2021-05-13

## 2021-05-13 ENCOUNTER — APPOINTMENT (OUTPATIENT)
Dept: CARDIOLOGY | Facility: CLINIC | Age: 76
End: 2021-05-13
Payer: MEDICARE

## 2021-05-13 VITALS
DIASTOLIC BLOOD PRESSURE: 72 MMHG | SYSTOLIC BLOOD PRESSURE: 124 MMHG | OXYGEN SATURATION: 98 % | WEIGHT: 194 LBS | TEMPERATURE: 98.3 F | HEIGHT: 62 IN | BODY MASS INDEX: 35.7 KG/M2 | HEART RATE: 65 BPM

## 2021-05-13 PROCEDURE — 93000 ELECTROCARDIOGRAM COMPLETE: CPT

## 2021-05-13 PROCEDURE — 99214 OFFICE O/P EST MOD 30 MIN: CPT

## 2021-05-13 PROCEDURE — 99072 ADDL SUPL MATRL&STAF TM PHE: CPT

## 2021-05-13 NOTE — PHYSICAL EXAM
[No Carotid Bruit] : no carotid bruit [Normal S1, S2] : normal S1, S2 [No Rub] : no rub [Soft] : abdomen soft [Non Tender] : non-tender [Normal Bowel Sounds] : normal bowel sounds [Normal] : alert and oriented, normal memory [de-identified] : 1/4 EDM in 2nd ICS.

## 2021-05-13 NOTE — REVIEW OF SYSTEMS
[Palpitations] : palpitations [Joint Pain] : joint pain [Negative] : Psychiatric [Dyspnea on exertion] : not dyspnea during exertion [Chest Discomfort] : no chest discomfort [Lower Ext Edema] : no extremity edema [Orthopnea] : no orthopnea [PND] : no PND [Dysuria] : no dysuria [Easy Bleeding] : no tendency for easy bleeding [Easy Bruising] : no tendency for easy bruising [FreeTextEntry9] : knee pain.

## 2021-05-13 NOTE — HISTORY OF PRESENT ILLNESS
[FreeTextEntry1] : iLanna Funk is a 76 year old female with hypertension, hypercholesterolemia, diabetes, heart murmur and non obstructive CAD comes for follow up visit. Denies any chest pain or palpitations. No shortness of breath on exertion. Compliant to medications and diet. Follows up with PCP. Received both the doses of COVID 19 vaccine.

## 2021-05-13 NOTE — DISCUSSION/SUMMARY
[FreeTextEntry1] : In a summary Angel Funk is an elderly female with hypertension, controlled. Continue current medications and 2 gm sodium diet. Hypercholesterolemia, on statins and low cholesterol diet. LDL goal less than 70 g/dL. Diabetes, on medications and low Carb diet. Nonobstructive CAD , on Aspirin and risk factor control. Heart murmur, Echo done in 9/2020  showed Mild MT . Continue healthy lifestyle. Follow up in 4 months.

## 2021-05-26 NOTE — PHYSICAL THERAPY INITIAL EVALUATION ADULT - DISCHARGE PLANNER MADE AWARE
TERRY RIDDLE is a 28y  s/p  PPD1 of a viable female infant.   Delivery c/b stable PPH QBL 1182, s/p methergine, hemabate    SUBJECTIVE:  Patient has no complaints, no acute events overnight.  Pain is well controlled with PRN pain medication.   She is ambulating, voiding, tolerating PO. Denies N/V.  Denies HA, dizziness, lightheadedness.  decreasing lochia.        OBJECTIVE:  Physical exam:  General: AOx3, NAD.  Heart: Regular, rate, and rhythm  Lungs: CTAB  Abdomen: Soft, appropriately tender to palpitation, firm uterine fundus at umbilicus.  Vaginal: minimal blood on pad, no bleeding on palpation of fundus  Ext: No DVT signs, warm extremities.    Vital Signs Last 24 Hrs  T(C): 37 (26 May 2021 03:44), Max: 37.0 (25 May 2021 07:27)  T(F): 98.6 (26 May 2021 03:44), Max: 98.6 (25 May 2021 07:27)  HR: 72 (26 May 2021 03:44) (61 - 125)  BP: 103/64 (26 May 2021 03:44) (83/48 - 150/87)  BP(mean): --  RR: 16 (26 May 2021 03:44) (14 - 20)  SpO2: 96% (26 May 2021 03:44) (96% - 100%)    LABS:                        10.7   10.42 )-----------( 117      ( 25 May 2021 17:04 )             32.1    no

## 2021-09-23 ENCOUNTER — APPOINTMENT (OUTPATIENT)
Dept: CARDIOLOGY | Facility: CLINIC | Age: 76
End: 2021-09-23
Payer: MEDICARE

## 2021-09-23 ENCOUNTER — NON-APPOINTMENT (OUTPATIENT)
Age: 76
End: 2021-09-23

## 2021-09-23 VITALS
HEART RATE: 64 BPM | TEMPERATURE: 97.7 F | OXYGEN SATURATION: 98 % | BODY MASS INDEX: 35.15 KG/M2 | SYSTOLIC BLOOD PRESSURE: 122 MMHG | DIASTOLIC BLOOD PRESSURE: 74 MMHG | WEIGHT: 191 LBS | HEIGHT: 62 IN

## 2021-09-23 PROCEDURE — 93306 TTE W/DOPPLER COMPLETE: CPT

## 2021-09-23 PROCEDURE — 99214 OFFICE O/P EST MOD 30 MIN: CPT | Mod: 25

## 2021-09-23 PROCEDURE — 93000 ELECTROCARDIOGRAM COMPLETE: CPT | Mod: 59

## 2021-09-23 PROCEDURE — 93225 XTRNL ECG REC<48 HRS REC: CPT

## 2021-09-23 NOTE — HISTORY OF PRESENT ILLNESS
[FreeTextEntry1] : Lianna Funk is a 76 year old female with Hypertension, hypercholesterolemia, diabetes and non obstructive CAD comes for follow up visit. Denies any chest pain or shortness of breath. Chronic Palpitations which are getting worse recently last few days which are random in onset and relieved by itself. No other associated symptoms. Denies any coffee or Tea use. Complaint to medications and diet. Follows up with PCP.

## 2021-09-23 NOTE — PHYSICAL EXAM
[No Carotid Bruit] : no carotid bruit [Normal S1, S2] : normal S1, S2 [No Rub] : no rub [Soft] : abdomen soft [Non Tender] : non-tender [Normal Bowel Sounds] : normal bowel sounds [Normal] : alert and oriented, normal memory [de-identified] : 1/4 EDM in 2nd ICS.

## 2021-09-23 NOTE — DISCUSSION/SUMMARY
[FreeTextEntry1] : In a summary Lianna Funk is an elderly female with Hypertension, controlled. Continue current medications and 2 gm sodium diet. Hypercholesterolemia, on statins and low cholesterol diet. LDL goal less than 70 g/dL. Diabetes, on medications and low Carb diet. Non obstructive CAD, on Aspirin. Palpitations, Holter monitor to rule out arrhythmias. Further plan based on Holter results. Continue healthy lifestyle. Follow up in 3 months.

## 2021-09-28 ENCOUNTER — NON-APPOINTMENT (OUTPATIENT)
Age: 76
End: 2021-09-28

## 2021-09-29 ENCOUNTER — APPOINTMENT (OUTPATIENT)
Dept: CARDIOLOGY | Facility: CLINIC | Age: 76
End: 2021-09-29
Payer: MEDICARE

## 2021-09-29 DIAGNOSIS — R06.02 SHORTNESS OF BREATH: ICD-10-CM

## 2021-09-29 PROCEDURE — 93227 XTRNL ECG REC<48 HR R&I: CPT

## 2021-10-12 ENCOUNTER — OUTPATIENT (OUTPATIENT)
Dept: OUTPATIENT SERVICES | Facility: HOSPITAL | Age: 76
LOS: 1 days | End: 2021-10-12

## 2021-10-12 ENCOUNTER — APPOINTMENT (OUTPATIENT)
Dept: CV DIAGNOSTICS | Facility: HOSPITAL | Age: 76
End: 2021-10-12
Payer: MEDICARE

## 2021-10-12 DIAGNOSIS — R06.02 SHORTNESS OF BREATH: ICD-10-CM

## 2021-10-12 DIAGNOSIS — Z98.890 OTHER SPECIFIED POSTPROCEDURAL STATES: Chronic | ICD-10-CM

## 2021-10-12 DIAGNOSIS — Z90.710 ACQUIRED ABSENCE OF BOTH CERVIX AND UTERUS: Chronic | ICD-10-CM

## 2021-10-12 DIAGNOSIS — E11.9 TYPE 2 DIABETES MELLITUS WITHOUT COMPLICATIONS: ICD-10-CM

## 2021-10-12 DIAGNOSIS — I25.10 ATHEROSCLEROTIC HEART DISEASE OF NATIVE CORONARY ARTERY WITHOUT ANGINA PECTORIS: ICD-10-CM

## 2021-10-12 PROCEDURE — 93018 CV STRESS TEST I&R ONLY: CPT | Mod: GC

## 2021-10-12 PROCEDURE — 93016 CV STRESS TEST SUPVJ ONLY: CPT | Mod: GC

## 2021-10-12 PROCEDURE — 78452 HT MUSCLE IMAGE SPECT MULT: CPT | Mod: 26,MH

## 2021-12-16 ENCOUNTER — APPOINTMENT (OUTPATIENT)
Dept: CARDIOLOGY | Facility: CLINIC | Age: 76
End: 2021-12-16
Payer: MEDICARE

## 2021-12-16 VITALS
OXYGEN SATURATION: 99 % | HEART RATE: 68 BPM | HEIGHT: 62 IN | BODY MASS INDEX: 35.33 KG/M2 | WEIGHT: 192 LBS | DIASTOLIC BLOOD PRESSURE: 80 MMHG | SYSTOLIC BLOOD PRESSURE: 148 MMHG | TEMPERATURE: 97.3 F

## 2021-12-16 DIAGNOSIS — R00.2 PALPITATIONS: ICD-10-CM

## 2021-12-16 PROCEDURE — 99214 OFFICE O/P EST MOD 30 MIN: CPT

## 2021-12-16 NOTE — DISCUSSION/SUMMARY
[FreeTextEntry1] : In a summary Lianna Funk is an elderly female with Hypertension, controlled. Continue current medications and 2 gm sodium diet. Hypercholesterolemia, on statins and low cholesterol diet. LDL goal less than 70 g/dL. Diabetes, on medications and low Carb diet. Non obstructive CAD, on Aspirin. Palpitations, on Atenolol. Continue healthy lifestyle. Follow up in 3 months.

## 2021-12-16 NOTE — PHYSICAL EXAM
[No Carotid Bruit] : no carotid bruit [Normal S1, S2] : normal S1, S2 [No Rub] : no rub [Soft] : abdomen soft [Non Tender] : non-tender [Normal Bowel Sounds] : normal bowel sounds [Normal] : alert and oriented, normal memory [de-identified] : 1/4 EDM in 2nd ICS.

## 2021-12-16 NOTE — HISTORY OF PRESENT ILLNESS
[FreeTextEntry1] : Lianna Funk is a 76 year old female with Hypertension, hypercholesterolemia, diabetes and non obstructive CAD comes for follow up visit. Denies any chest pain or shortness of breath. Chronic Palpitations on and off  which are better compared to last visit. Holter monitor done last visit showed few episodes of wide complex tachycardia and on Atenolol. Feeling better.No other associated symptoms. Denies any coffee or Tea use. Complaint to medications and diet. Follows up with PCP.

## 2022-03-29 ENCOUNTER — NON-APPOINTMENT (OUTPATIENT)
Age: 77
End: 2022-03-29

## 2022-03-29 ENCOUNTER — APPOINTMENT (OUTPATIENT)
Dept: CARDIOLOGY | Facility: CLINIC | Age: 77
End: 2022-03-29
Payer: MEDICARE

## 2022-03-29 VITALS
WEIGHT: 197 LBS | SYSTOLIC BLOOD PRESSURE: 138 MMHG | HEART RATE: 67 BPM | BODY MASS INDEX: 36.25 KG/M2 | TEMPERATURE: 97.2 F | DIASTOLIC BLOOD PRESSURE: 68 MMHG | OXYGEN SATURATION: 97 % | HEIGHT: 62 IN

## 2022-03-29 PROCEDURE — 99214 OFFICE O/P EST MOD 30 MIN: CPT

## 2022-03-29 PROCEDURE — 93000 ELECTROCARDIOGRAM COMPLETE: CPT

## 2022-03-29 NOTE — PHYSICAL EXAM
[No Carotid Bruit] : no carotid bruit [Normal S1, S2] : normal S1, S2 [No Rub] : no rub [Soft] : abdomen soft [Non Tender] : non-tender [Normal Bowel Sounds] : normal bowel sounds [Normal] : alert and oriented, normal memory [de-identified] : 1/4 EDM in 2nd ICS.

## 2022-03-29 NOTE — HISTORY OF PRESENT ILLNESS
[FreeTextEntry1] : Lianna Funk is a 77 year old female with Hypertension, hypercholesterolemia, diabetes and non obstructive CAD comes for follow up visit. Denies any chest pain or shortness of breath. Chronic Palpitations on and off and very rare. Complaint to medications and diet. Follows up with PCP.

## 2022-03-29 NOTE — DISCUSSION/SUMMARY
[FreeTextEntry1] : In a summary Lianna Funk is an elderly female with Hypertension, controlled. Continue Amlodipine, Losartan- HCTZ  and 2 gm sodium diet. Hypercholesterolemia, continue Pravachol  and low cholesterol diet. LDL goal less than 70 g/dL. Diabetes, on medications and low Carb diet. Non obstructive CAD, continue  Aspirin. Palpitations,  continue  Atenolol. Continue healthy lifestyle. Follow up in 4 months.

## 2022-07-12 ENCOUNTER — NON-APPOINTMENT (OUTPATIENT)
Age: 77
End: 2022-07-12

## 2022-07-12 ENCOUNTER — APPOINTMENT (OUTPATIENT)
Dept: CARDIOLOGY | Facility: CLINIC | Age: 77
End: 2022-07-12

## 2022-07-12 VITALS
OXYGEN SATURATION: 97 % | DIASTOLIC BLOOD PRESSURE: 82 MMHG | SYSTOLIC BLOOD PRESSURE: 148 MMHG | HEART RATE: 73 BPM | HEIGHT: 62 IN | BODY MASS INDEX: 36.44 KG/M2 | WEIGHT: 198 LBS

## 2022-07-12 VITALS — DIASTOLIC BLOOD PRESSURE: 66 MMHG | SYSTOLIC BLOOD PRESSURE: 122 MMHG

## 2022-07-12 PROCEDURE — 93000 ELECTROCARDIOGRAM COMPLETE: CPT

## 2022-07-12 PROCEDURE — 99214 OFFICE O/P EST MOD 30 MIN: CPT | Mod: 25

## 2022-07-12 NOTE — REVIEW OF SYSTEMS
[Palpitations] : palpitations [Joint Pain] : joint pain [Negative] : Respiratory [Blurry Vision] : no blurred vision [Dyspnea on exertion] : not dyspnea during exertion [Chest Discomfort] : no chest discomfort [Lower Ext Edema] : no extremity edema [Orthopnea] : no orthopnea [PND] : no PND [Abdominal Pain] : no abdominal pain [Nausea] : no nausea [Vomiting] : no vomiting [Heartburn] : no heartburn [Dysuria] : no dysuria [Rash] : no rash [Itching] : no itching [Dizziness] : no dizziness [Tremor] : no tremor was seen [Numbness (Hypoesthesia)] : no numbness [Tingling (Paresthesia)] : no tingling [Confusion] : no confusion was observed [Anxiety] : no anxiety [Under Stress] : not under stress [Easy Bleeding] : no tendency for easy bleeding [Easy Bruising] : no tendency for easy bruising [FreeTextEntry9] : knee pain.

## 2022-07-12 NOTE — DISCUSSION/SUMMARY
[FreeTextEntry1] : In a summary Lianna Funk is an elderly female with Hypertension, controlled. Continue Amlodipine, Losartan- HCTZ  and 2 gm sodium diet. Hypercholesterolemia, continue Pravachol  and low cholesterol diet. LDL goal less than 70 g/dL. Diabetes, on medications and low Carb diet. Non obstructive CAD, continue  Aspirin. Palpitations,  continue  Atenolol. Continue healthy lifestyle. Follow up in 3 months. 
parents

## 2022-07-12 NOTE — PHYSICAL EXAM
[No Carotid Bruit] : no carotid bruit [Normal S1, S2] : normal S1, S2 [No Rub] : no rub [Soft] : abdomen soft [Non Tender] : non-tender [Normal Bowel Sounds] : normal bowel sounds [Normal Gait] : normal gait [Normal] : alert and oriented, normal memory [de-identified] : 1/4 EDM in 2nd ICS.

## 2022-07-30 ENCOUNTER — EMERGENCY (EMERGENCY)
Facility: HOSPITAL | Age: 77
LOS: 1 days | Discharge: HOME | End: 2022-07-30
Attending: EMERGENCY MEDICINE | Admitting: EMERGENCY MEDICINE

## 2022-07-30 VITALS
DIASTOLIC BLOOD PRESSURE: 64 MMHG | TEMPERATURE: 98 F | RESPIRATION RATE: 16 BRPM | HEIGHT: 59 IN | HEART RATE: 73 BPM | SYSTOLIC BLOOD PRESSURE: 135 MMHG | OXYGEN SATURATION: 99 %

## 2022-07-30 VITALS
HEART RATE: 64 BPM | RESPIRATION RATE: 19 BRPM | OXYGEN SATURATION: 99 % | SYSTOLIC BLOOD PRESSURE: 136 MMHG | TEMPERATURE: 98 F | DIASTOLIC BLOOD PRESSURE: 65 MMHG

## 2022-07-30 DIAGNOSIS — Z90.710 ACQUIRED ABSENCE OF BOTH CERVIX AND UTERUS: Chronic | ICD-10-CM

## 2022-07-30 DIAGNOSIS — Z98.890 OTHER SPECIFIED POSTPROCEDURAL STATES: Chronic | ICD-10-CM

## 2022-07-30 LAB
ALBUMIN SERPL ELPH-MCNC: 4 G/DL — SIGNIFICANT CHANGE UP (ref 3.3–5)
ALP SERPL-CCNC: 55 U/L — SIGNIFICANT CHANGE UP (ref 40–120)
ALT FLD-CCNC: 20 U/L — SIGNIFICANT CHANGE UP (ref 4–33)
ANION GAP SERPL CALC-SCNC: 12 MMOL/L — SIGNIFICANT CHANGE UP (ref 7–14)
APPEARANCE UR: CLEAR — SIGNIFICANT CHANGE UP
AST SERPL-CCNC: 18 U/L — SIGNIFICANT CHANGE UP (ref 4–32)
BILIRUB SERPL-MCNC: 0.2 MG/DL — SIGNIFICANT CHANGE UP (ref 0.2–1.2)
BILIRUB UR-MCNC: NEGATIVE — SIGNIFICANT CHANGE UP
BUN SERPL-MCNC: 17 MG/DL — SIGNIFICANT CHANGE UP (ref 7–23)
CALCIUM SERPL-MCNC: 9.6 MG/DL — SIGNIFICANT CHANGE UP (ref 8.4–10.5)
CHLORIDE SERPL-SCNC: 104 MMOL/L — SIGNIFICANT CHANGE UP (ref 98–107)
CO2 SERPL-SCNC: 25 MMOL/L — SIGNIFICANT CHANGE UP (ref 22–31)
COLOR SPEC: SIGNIFICANT CHANGE UP
CREAT SERPL-MCNC: 1.22 MG/DL — SIGNIFICANT CHANGE UP (ref 0.5–1.3)
DIFF PNL FLD: ABNORMAL
EGFR: 45 ML/MIN/1.73M2 — LOW
GLUCOSE SERPL-MCNC: 114 MG/DL — HIGH (ref 70–99)
GLUCOSE UR QL: NEGATIVE — SIGNIFICANT CHANGE UP
HCT VFR BLD CALC: 39 % — SIGNIFICANT CHANGE UP (ref 34.5–45)
HGB BLD-MCNC: 12.9 G/DL — SIGNIFICANT CHANGE UP (ref 11.5–15.5)
KETONES UR-MCNC: NEGATIVE — SIGNIFICANT CHANGE UP
LEUKOCYTE ESTERASE UR-ACNC: NEGATIVE — SIGNIFICANT CHANGE UP
MAGNESIUM SERPL-MCNC: 1.7 MG/DL — SIGNIFICANT CHANGE UP (ref 1.6–2.6)
MCHC RBC-ENTMCNC: 30.6 PG — SIGNIFICANT CHANGE UP (ref 27–34)
MCHC RBC-ENTMCNC: 33.1 GM/DL — SIGNIFICANT CHANGE UP (ref 32–36)
MCV RBC AUTO: 92.6 FL — SIGNIFICANT CHANGE UP (ref 80–100)
NITRITE UR-MCNC: NEGATIVE — SIGNIFICANT CHANGE UP
NRBC # BLD: 0 /100 WBCS — SIGNIFICANT CHANGE UP
NRBC # FLD: 0 K/UL — SIGNIFICANT CHANGE UP
PH UR: 6 — SIGNIFICANT CHANGE UP (ref 5–8)
PHOSPHATE SERPL-MCNC: 2.6 MG/DL — SIGNIFICANT CHANGE UP (ref 2.5–4.5)
PLATELET # BLD AUTO: 298 K/UL — SIGNIFICANT CHANGE UP (ref 150–400)
POTASSIUM SERPL-MCNC: 3.5 MMOL/L — SIGNIFICANT CHANGE UP (ref 3.5–5.3)
POTASSIUM SERPL-SCNC: 3.5 MMOL/L — SIGNIFICANT CHANGE UP (ref 3.5–5.3)
PROT SERPL-MCNC: 7.3 G/DL — SIGNIFICANT CHANGE UP (ref 6–8.3)
PROT UR-MCNC: NEGATIVE — SIGNIFICANT CHANGE UP
RBC # BLD: 4.21 M/UL — SIGNIFICANT CHANGE UP (ref 3.8–5.2)
RBC # FLD: 13.2 % — SIGNIFICANT CHANGE UP (ref 10.3–14.5)
RBC CASTS # UR COMP ASSIST: SIGNIFICANT CHANGE UP /HPF (ref 0–4)
SARS-COV-2 RNA SPEC QL NAA+PROBE: DETECTED
SODIUM SERPL-SCNC: 141 MMOL/L — SIGNIFICANT CHANGE UP (ref 135–145)
SP GR SPEC: 1.01 — SIGNIFICANT CHANGE UP (ref 1–1.05)
UROBILINOGEN FLD QL: SIGNIFICANT CHANGE UP
WBC # BLD: 8.03 K/UL — SIGNIFICANT CHANGE UP (ref 3.8–10.5)
WBC # FLD AUTO: 8.03 K/UL — SIGNIFICANT CHANGE UP (ref 3.8–10.5)
WBC UR QL: SIGNIFICANT CHANGE UP /HPF (ref 0–5)

## 2022-07-30 PROCEDURE — 99284 EMERGENCY DEPT VISIT MOD MDM: CPT

## 2022-07-30 PROCEDURE — 71045 X-RAY EXAM CHEST 1 VIEW: CPT | Mod: 26

## 2022-07-30 RX ORDER — SODIUM CHLORIDE 9 MG/ML
1000 INJECTION, SOLUTION INTRAVENOUS ONCE
Refills: 0 | Status: COMPLETED | OUTPATIENT
Start: 2022-07-30 | End: 2022-07-30

## 2022-07-30 RX ADMIN — SODIUM CHLORIDE 1000 MILLILITER(S): 9 INJECTION, SOLUTION INTRAVENOUS at 10:05

## 2022-07-30 NOTE — ED PROVIDER NOTE - PHYSICAL EXAMINATION
PHYSICAL EXAM:  GENERAL: NAD, Resting in bed  HEENT:  Head atraumatic, EOMI, PERRLA, conjunctiva and sclera clear; Moist mucous membranes, normal oropharynx  NECK: Supple, No JVD, no lymphadenopathy  CHEST/LUNG: Clear to auscultation bilaterally; No rales, rhonchi, wheezing, or rubs. Unlabored respirations on room air  HEART: Regular rate and rhythm; No murmurs, rubs, or gallops  ABDOMEN: Bowel sounds present; Soft, Nontender, Nondistended.   EXTREMITIES:  2+ Peripheral Pulses, brisk capillary refill. No clubbing, cyanosis, or edema  NERVOUS SYSTEM:  Alert & Oriented X3, non-focal and spontaneous movements of all extremities, normal leg raise, normal ambulation, 5/5 strength in upper/lower ext at flex/ext joints.   SKIN: No rashes or lesions PHYSICAL EXAM:  GENERAL: NAD, Resting in bed  HEENT:  Head atraumatic, EOMI, PERRLA, conjunctiva and sclera clear; Moist mucous membranes, normal oropharynx  NECK: Supple, No JVD, no lymphadenopathy  CHEST/LUNG: Clear to auscultation bilaterally; No rales, rhonchi, wheezing, or rubs. Unlabored respirations on room air  HEART: Regular rate and rhythm; No murmurs, rubs, or gallops  ABDOMEN: Bowel sounds present; Soft, Nontender, Nondistended.   EXTREMITIES: 1+ BL LE edema, unchanged from baseline. 2+ Peripheral Pulses, brisk capillary refill. No clubbing, cyanosis,  NERVOUS SYSTEM:  Alert & Oriented X3, non-focal and spontaneous movements of all extremities, normal leg raise, normal ambulation, 5/5 strength in upper/lower ext at flex/ext joints.   SKIN: No rashes or lesions

## 2022-07-30 NOTE — ED PROVIDER NOTE - NSICDXPASTMEDICALHX_GEN_ALL_CORE_FT
PAST MEDICAL HISTORY:  DM (Diabetes Mellitus)     Generalized Osteoarthritis     Hypercholesteremia     Hypertension     Rotator cuff tear, non-traumatic, left

## 2022-07-30 NOTE — ED PROVIDER NOTE - NSFOLLOWUPINSTRUCTIONS_ED_ALL_ED_FT
You presented to the hospital for generalized weakness and feeling unwell after getting COVID 2 weeks ago with Paxlovid treatment. We got blood work and imaging that did not show any signs of infection. Your urinalysis was also clear with no signs of UTI. Chest x-ray with no concern for any infection. Please continue to monitor your symptoms at home. Follow-up with your primary care physician in 1-2 weeks or return to the ED if you have any chest pain, shortness of breath or worsening symptoms.

## 2022-07-30 NOTE — ED PROVIDER NOTE - CARE PROVIDER_API CALL
Angelika Adams)  Internal Medicine  [address], [address]  [place] [address]  Phone: [phone]  Fax: [phone]  Follow Up Time: Routine

## 2022-07-30 NOTE — ED PROVIDER NOTE - NS ED ROS FT
REVIEW OF SYSTEMS:  CONSTITUTIONAL: +weakness, + chills, No fevers  EYES/ENT: No visual changes;  No vertigo or throat pain   NECK: No pain or stiffness  RESPIRATORY: No cough, wheezing, hemoptysis; No shortness of breath  CARDIOVASCULAR: No chest pain or palpitations  GASTROINTESTINAL: No abdominal or epigastric pain. No nausea, vomiting, or hematemesis; No diarrhea or constipation. No melena or hematochezia.  GENITOURINARY: No dysuria, frequency or hematuria  NEUROLOGICAL: No numbness or weakness  SKIN: No itching, rashes

## 2022-07-30 NOTE — ED PROVIDER NOTE - CLINICAL SUMMARY MEDICAL DECISION MAKING FREE TEXT BOX
Piter: recent covid, got paxlovid. last night with chills. No SOB, no urinary c/o. + decreased PO. today with general weakness. Neuro exam intact.  Will hydrate check for infectious sources and ambulate. 78 yo female with HTN, DM, OA, and COVID s/p paxlovid who presents with chills and weakness. Neuro exam unremarkable, afebrile, VSS, no SOB or urinary changes. Pending lab, UA, and CXR, started fluids for dehydration, r/o infection.     Piter: recent covid, got paxlovid. last night with chills. No SOB, no urinary c/o. + decreased PO. today with general weakness. Neuro exam intact.  Will hydrate check for infectious sources and ambulate.

## 2022-07-30 NOTE — ED ADULT NURSE NOTE - NSIMPLEMENTINTERV_GEN_ALL_ED
Implemented All Fall Risk Interventions:  [place] to call system. Call bell, personal items and telephone within reach. Instruct patient to call for assistance. Room bathroom lighting operational. Non-slip footwear when patient is off stretcher. Physically safe environment: no spills, clutter or unnecessary equipment. Stretcher in lowest position, wheels locked, appropriate side rails in place. Provide visual cue, wrist band, yellow gown, etc. Monitor gait and stability. Monitor for mental status changes and reorient to person, place, and time. Review medications for side effects contributing to fall risk. Reinforce activity limits and safety measures with patient and family.

## 2022-07-30 NOTE — ED PROVIDER NOTE - PATIENT PORTAL LINK FT
You can access the FollowMyHealth Patient Portal offered by [facility] by registering at the following website: [email or web address]. By joining [employer]’s FollowMyHealth portal, you will also be able to view your health information using other applications (apps) compatible with our system.

## 2022-07-30 NOTE — ED ADULT NURSE NOTE - OBJECTIVE STATEMENT
78 y/o F arrives to E.D. rm 6 c/o weakness since having covid 2 wks ago. Pt a&ox4, ambulatory, ORR noted, denies SOB, denies CP, neg N/V/D. Cough noted, unknown if productive or not. NSR on tele. Pending orders. Call bell in reach. Frequent monitoring in place.

## 2022-07-30 NOTE — ED ADULT NURSE NOTE - WILL THE PATIENT ACCEPT THE PFIZER COVID-19 VACCINE IF ELIGIBLE AND IT IS AVAILABLE?
CC: Colposcopy    Procedure   Procedures       Physical Exam  Genitourinary:              Colposcopy Procedure Note    Indications: Pap smear recently returned positive for HPV but negative for the high risk strains.  Due to history of abnormal Pap smears, colposcopy was recommended.    Procedure Details   The risks and benefits of the procedure and Verbal informed consent obtained.    Speculum placed in vagina and excellent visualization of cervix achieved, cervix swabbed x 3 with acetic acid solution.    Findings:  Cervix: no visible lesions, acetowhite lesion(s) noted at 11-1 o'clock and HPV changes noted at 1 o'clock; cervix swabbed with Lugol's solution, SCJ visualized - lesion at 1 o'clock, endocervical curettage performed, cervical biopsies taken at 11 and 1 o'clock, specimen labelled and sent to pathology and hemostasis achieved with Monsel's solution.  Vaginal inspection: normal without visible lesions.  Vulvar colposcopy: vulvar colposcopy not performed.    Specimens: Cervical biopsies at 11 and 1 o'clock; ECC    Complications: none.    Patient tolerated the procedure well without complications.    Plan:  Specimens labelled and sent to Pathology.  Will base further treatment on Pathology findings.  Post biopsy instructions given to patient.        1/24/2022  Eusebia Poole MD  
Not applicable

## 2022-07-30 NOTE — ED PROVIDER NOTE - CARE PLAN
Principal Discharge DX:	General weakness  Assessment and plan of treatment:	78 yo female with IDDM and HTN who presents with generalized weakness and fatigue s/p COVID dx 2 weeks ago w/paxlovid treatment. Pt's VSS, labs and imaging unremarkable for any infectious etiology   1

## 2022-07-30 NOTE — ED PROVIDER NOTE - PROGRESS NOTE DETAILS
Started fluids, pending lab and CXR POCT glucose 54 from 121, pt took 15u novolog and metformin this AM, no breakfast, eating now. pending repeat POCT after oral intake Glucose improved to > 100, otherwise stable for d/c home

## 2022-07-30 NOTE — ED ADULT NURSE REASSESSMENT NOTE - NS ED NURSE REASSESS COMMENT FT1
Pt resting in bed, assisted to restroom as needed. Low BGL reported to Dr. Melendez, pt provided with juice and a meal. BGL re-checked. Call bell in reach. Will continue to monitro.

## 2022-07-30 NOTE — ED PROVIDER NOTE - OBJECTIVE STATEMENT
77 year-old female with hx of HTN, DM2, osteoarthritis, and recent COVID s/p paxlovid treatment who presents with chills and weakness. The patient states had a positive COVID 2 weeks ago, finished Paxlovid 7/27, and tested negative at home yesterday, 7/29. The patient states she woke up this morning with chills and lower extremity weakness. She has a mild, productive cough present x 2 weeks. She denies any headache, vision changes, vertigo, difficulty with ambulation, numbness or tingling sensation of extremities. No chest pain, SOB, abdominal pain, N/V/D, fever, dysuria, hematuria or bowel changes. The patient lives at home with her , no assistance required with ambulation. No recent changes in medications or diet, does report poor intake and dehydration x2 weeks, but no other changes. Presents to ED for further evaluation, pt's  at bedside.

## 2022-07-30 NOTE — ED PROVIDER NOTE - NSICDXPASTSURGICALHX_GEN_ALL_CORE_FT
PAST SURGICAL HISTORY:  H/O total hysterectomy     History of arthroplasty of left shoulder     S/P Cholecystectomy

## 2022-07-30 NOTE — ED ADULT TRIAGE NOTE - CHIEF COMPLAINT QUOTE
Pt c/o weakness since yesterday that got worse this am.  Dizziness earlier this am.  Denies CP/SOB.  +Coughing.  BGL 60's this am.   on scene.

## 2022-07-30 NOTE — ED PROVIDER NOTE - PLAN OF CARE
76 yo female with IDDM and HTN who presents with generalized weakness and fatigue s/p COVID dx 2 weeks ago w/paxlovid treatment. Pt's VSS, labs and imaging unremarkable for any infectious etiology

## 2022-07-30 NOTE — ED ADULT TRIAGE NOTE - HEART RATE (BEATS/MIN)
NOB. US done today confirms EDC 9-. Patient is uncertain about genetic testing-information given. Reviewed common pregnancy changes. Questions answered. OB labs done. RTC 4 weeks    Shira is a 24 year old year old female here for an OB check.  She is      , with LMP of Patient's last menstrual period was 2016 (approximate)., and a Gestational Age: 13w2d.      OB History    Para Term  AB SAB TAB Ectopic Multiple Living   2 1 1       1      # Outcome Date GA Lbr Jaret/2nd Weight Sex Delivery Anes PTL Lv   2 Current            1 Term 12 39w0d  3.515 kg M Vag-Spont EPI N Y          Past Medical History:   Diagnosis Date   • Herpes genitalia 2016         REPR PDA BY DIVISION <19 Y/O                                    Comment: age 5    APPENDECTOMY                                                    Comment: age 7    ALLERGIES:  No Known Allergies    Current Outpatient Prescriptions   Medication   • Prenatal Vit-Fe Fumarate-FA (PRENATAL PO)     No current facility-administered medications for this visit.        FAMILY HX:  Family History   Problem Relation Age of Onset   • Celiac Disease Mother    • Anemia Mother    • Heart disease Other      cabg x3   • Depression Father    • Depression Paternal Grandmother    • Diabetes Other      Review of patient's family status indicates:    Mother                                                   Other                          Alive                     Father                                                   Paternal Grandmother                                     Other                          Alive                       SOCIAL HX:    Tobacco Use: Quit          Packs/Day:       Years:            Quit date: 2017       Comment: pt is slowly starting to quit on her own.      Alcohol Use: No                 Comment: few glases of wine past 2 weeks      Drug Use:    Yes                Special: Marijuana       Comment: Former Marijuana       Sexually Active: Yes             Partners with: Male       Birth Control/Protection: None      The following topics were reviewed and discussed with the patient at this visit:   HIV and other routine prenatal tests    Risk factors identified by prenatal history    Anticipated course of prenatal care    Toxoplasmosis precautions (cats/raw meat)    Sexual activity    Environmental / Work hazards    Travel    Alcohol    Illicit / Recreational drugs    Use of any medications (including supplements, vitamins, herbal or OTC drugs), Anatomy Ultrasound.     ASSESSMENT/PLAN:  13w2d  1. Prenatal care, subsequent pregnancy, first trimester       73

## 2022-10-11 ENCOUNTER — APPOINTMENT (OUTPATIENT)
Dept: CARDIOLOGY | Facility: CLINIC | Age: 77
End: 2022-10-11

## 2022-10-11 VITALS
SYSTOLIC BLOOD PRESSURE: 152 MMHG | DIASTOLIC BLOOD PRESSURE: 82 MMHG | WEIGHT: 194 LBS | BODY MASS INDEX: 35.48 KG/M2 | HEART RATE: 67 BPM | OXYGEN SATURATION: 96 %

## 2022-10-11 VITALS — DIASTOLIC BLOOD PRESSURE: 82 MMHG | SYSTOLIC BLOOD PRESSURE: 140 MMHG

## 2022-10-11 PROCEDURE — 99214 OFFICE O/P EST MOD 30 MIN: CPT

## 2022-10-11 NOTE — PHYSICAL EXAM
[No Carotid Bruit] : no carotid bruit [Normal S1, S2] : normal S1, S2 [No Rub] : no rub [Soft] : abdomen soft [Non Tender] : non-tender [Normal Bowel Sounds] : normal bowel sounds [Normal Gait] : normal gait [Normal] : alert and oriented, normal memory [de-identified] : 1/4 EDM in 2nd ICS.

## 2022-10-11 NOTE — HISTORY OF PRESENT ILLNESS
[FreeTextEntry1] : Lianna Funk is a 77 year old female with Hypertension, hypercholesterolemia, diabetes and non obstructive CAD comes for follow up visit. Denies any chest pain or shortness of breath. No Palpitations . Complaint to medications and diet. Follows up with PCP.

## 2022-10-11 NOTE — REVIEW OF SYSTEMS
[Joint Pain] : joint pain [Negative] : Respiratory [Blurry Vision] : no blurred vision [Dyspnea on exertion] : not dyspnea during exertion [Chest Discomfort] : no chest discomfort [Lower Ext Edema] : no extremity edema [Palpitations] : no palpitations [Orthopnea] : no orthopnea [PND] : no PND [Abdominal Pain] : no abdominal pain [Nausea] : no nausea [Vomiting] : no vomiting [Heartburn] : no heartburn [Dysuria] : no dysuria [Rash] : no rash [Itching] : no itching [Dizziness] : no dizziness [Tremor] : no tremor was seen [Numbness (Hypoesthesia)] : no numbness [Tingling (Paresthesia)] : no tingling [Confusion] : no confusion was observed [Anxiety] : no anxiety [Under Stress] : not under stress [Easy Bleeding] : no tendency for easy bleeding [Easy Bruising] : no tendency for easy bruising [FreeTextEntry9] : knee pain.

## 2022-10-11 NOTE — DISCUSSION/SUMMARY
[FreeTextEntry1] : In a summary Lianna Funk is an elderly female with Hypertension, controlled. Continue Amlodipine, Losartan- HCTZ  and 2 gm sodium diet. Hypercholesterolemia, continue Pravachol  and low cholesterol diet. LDL goal less than 70 g/dL. Diabetes, on medications and low Carb diet. Non obstructive CAD, continue  Aspirin. Palpitations,  continue  Atenolol. Hypertension and heart murmur, will get Echo to assess LV systolic function and heart murmur. Continue healthy lifestyle. Follow up in 3 months.

## 2022-10-20 ENCOUNTER — APPOINTMENT (OUTPATIENT)
Dept: CARDIOLOGY | Facility: CLINIC | Age: 77
End: 2022-10-20

## 2022-10-20 PROCEDURE — 93306 TTE W/DOPPLER COMPLETE: CPT

## 2023-01-19 ENCOUNTER — NON-APPOINTMENT (OUTPATIENT)
Age: 78
End: 2023-01-19

## 2023-01-19 ENCOUNTER — APPOINTMENT (OUTPATIENT)
Dept: CARDIOLOGY | Facility: CLINIC | Age: 78
End: 2023-01-19
Payer: MEDICARE

## 2023-01-19 VITALS
HEIGHT: 62 IN | BODY MASS INDEX: 35.36 KG/M2 | HEART RATE: 72 BPM | OXYGEN SATURATION: 100 % | SYSTOLIC BLOOD PRESSURE: 124 MMHG | WEIGHT: 192.13 LBS | DIASTOLIC BLOOD PRESSURE: 70 MMHG

## 2023-01-19 PROCEDURE — 93000 ELECTROCARDIOGRAM COMPLETE: CPT

## 2023-01-19 PROCEDURE — 99214 OFFICE O/P EST MOD 30 MIN: CPT | Mod: 25

## 2023-01-19 NOTE — DISCUSSION/SUMMARY
[FreeTextEntry1] : In a summary Lianna Funk is an elderly female with Hypertension, controlled. Continue Amlodipine, Losartan- HCTZ  and 2 gm sodium diet. Hypercholesterolemia, continue Pravachol  and low cholesterol diet. LDL goal less than 70 g/dL. Diabetes, on medications and low Carb diet. Non obstructive CAD, continue  Aspirin. Palpitations,  continue  Atenolol. Continue healthy lifestyle. Follow up in 3 months.

## 2023-01-19 NOTE — HISTORY OF PRESENT ILLNESS
[FreeTextEntry1] : Lianna Funk is a 78 year old female with Hypertension, hypercholesterolemia, diabetes and non obstructive CAD comes for follow up visit. Denies any chest pain or shortness of breath. No Palpitations . Complaint to medications and diet. Follows up with PCP.

## 2023-01-19 NOTE — PHYSICAL EXAM
[No Carotid Bruit] : no carotid bruit [Normal S1, S2] : normal S1, S2 [No Rub] : no rub [Soft] : abdomen soft [Non Tender] : non-tender [Normal Bowel Sounds] : normal bowel sounds [Normal Gait] : normal gait [Normal] : alert and oriented, normal memory [de-identified] : 1/4 EDM in 2nd ICS.

## 2023-01-19 NOTE — REVIEW OF SYSTEMS
Controlled with current regime [Joint Pain] : joint pain [Negative] : Respiratory [Blurry Vision] : no blurred vision [Dyspnea on exertion] : not dyspnea during exertion [Chest Discomfort] : no chest discomfort [Lower Ext Edema] : no extremity edema [Palpitations] : no palpitations [Orthopnea] : no orthopnea [PND] : no PND [Abdominal Pain] : no abdominal pain [Nausea] : no nausea [Vomiting] : no vomiting [Heartburn] : no heartburn [Dysuria] : no dysuria [Rash] : no rash [Itching] : no itching [Dizziness] : no dizziness [Tremor] : no tremor was seen [Numbness (Hypoesthesia)] : no numbness [Tingling (Paresthesia)] : no tingling [Confusion] : no confusion was observed [Anxiety] : no anxiety [Under Stress] : not under stress [Easy Bleeding] : no tendency for easy bleeding [Easy Bruising] : no tendency for easy bruising [FreeTextEntry9] : knee pain.

## 2023-04-20 ENCOUNTER — APPOINTMENT (OUTPATIENT)
Dept: CARDIOLOGY | Facility: CLINIC | Age: 78
End: 2023-04-20

## 2023-05-10 ENCOUNTER — APPOINTMENT (OUTPATIENT)
Dept: CARDIOLOGY | Facility: CLINIC | Age: 78
End: 2023-05-10
Payer: MEDICARE

## 2023-05-10 VITALS
DIASTOLIC BLOOD PRESSURE: 72 MMHG | OXYGEN SATURATION: 98 % | HEIGHT: 62 IN | HEART RATE: 61 BPM | WEIGHT: 192 LBS | BODY MASS INDEX: 35.33 KG/M2 | SYSTOLIC BLOOD PRESSURE: 132 MMHG

## 2023-05-10 PROCEDURE — 99214 OFFICE O/P EST MOD 30 MIN: CPT

## 2023-05-10 NOTE — PHYSICAL EXAM
[No Carotid Bruit] : no carotid bruit [Normal S1, S2] : normal S1, S2 [No Rub] : no rub [Soft] : abdomen soft [Non Tender] : non-tender [Normal Bowel Sounds] : normal bowel sounds [Normal Gait] : normal gait [Normal] : alert and oriented, normal memory [de-identified] : 1/4 EDM in 2nd ICS.

## 2023-09-14 ENCOUNTER — APPOINTMENT (OUTPATIENT)
Dept: CARDIOLOGY | Facility: CLINIC | Age: 78
End: 2023-09-14
Payer: MEDICARE

## 2023-09-14 ENCOUNTER — NON-APPOINTMENT (OUTPATIENT)
Age: 78
End: 2023-09-14

## 2023-09-14 VITALS
TEMPERATURE: 98 F | WEIGHT: 194 LBS | HEART RATE: 81 BPM | BODY MASS INDEX: 35.7 KG/M2 | OXYGEN SATURATION: 98 % | DIASTOLIC BLOOD PRESSURE: 78 MMHG | HEIGHT: 62 IN | SYSTOLIC BLOOD PRESSURE: 146 MMHG | RESPIRATION RATE: 16 BRPM

## 2023-09-14 PROCEDURE — 93000 ELECTROCARDIOGRAM COMPLETE: CPT

## 2023-09-14 PROCEDURE — 99214 OFFICE O/P EST MOD 30 MIN: CPT | Mod: 25

## 2023-12-14 ENCOUNTER — APPOINTMENT (OUTPATIENT)
Dept: CARDIOLOGY | Facility: CLINIC | Age: 78
End: 2023-12-14
Payer: MEDICARE

## 2023-12-14 ENCOUNTER — NON-APPOINTMENT (OUTPATIENT)
Age: 78
End: 2023-12-14

## 2023-12-14 VITALS
HEART RATE: 69 BPM | OXYGEN SATURATION: 98 % | HEIGHT: 62 IN | TEMPERATURE: 98.7 F | WEIGHT: 186 LBS | BODY MASS INDEX: 34.23 KG/M2 | SYSTOLIC BLOOD PRESSURE: 145 MMHG | DIASTOLIC BLOOD PRESSURE: 83 MMHG

## 2023-12-14 VITALS — DIASTOLIC BLOOD PRESSURE: 72 MMHG | SYSTOLIC BLOOD PRESSURE: 138 MMHG

## 2023-12-14 DIAGNOSIS — R01.1 CARDIAC MURMUR, UNSPECIFIED: ICD-10-CM

## 2023-12-14 PROCEDURE — 93306 TTE W/DOPPLER COMPLETE: CPT

## 2023-12-14 PROCEDURE — 93000 ELECTROCARDIOGRAM COMPLETE: CPT

## 2023-12-14 PROCEDURE — 99214 OFFICE O/P EST MOD 30 MIN: CPT | Mod: 25

## 2023-12-14 NOTE — HISTORY OF PRESENT ILLNESS
[FreeTextEntry1] : Lianna Funk is an 78-year-old female with Hypertension, heart murmur, hypercholesterolemia, diabetes and non-obstructive CAD comes for follow up visit. Denies any chest pain or shortness of breath. On and off Palpitations. Complaint to medications and diet. Follows up with PCP.

## 2023-12-14 NOTE — DISCUSSION/SUMMARY
[FreeTextEntry1] : In a summary Lianan Funk is an elderly female with Hypertension, controlled. Continue Amlodipine, Losartan- HCTZ, Atenolol and 2 gm sodium diet. Hypercholesterolemia, continue Pravachol and low cholesterol diet. LDL goal less than 70 g/dL. Diabetes, on medications and low Carb diet. Non obstructive CAD, continue Aspirin. Palpitations, continue Atenolol. Hypertension and heart murmur, Echo done showed normal LV systolic function and mild [place]. Echo results explained. Continue healthy lifestyle. Follow up in 4 months.

## 2023-12-14 NOTE — REVIEW OF SYSTEMS
[Blurry Vision] : no blurred vision [Dyspnea on exertion] : not dyspnea during exertion [Chest Discomfort] : no chest discomfort [Lower Ext Edema] : no extremity edema [Palpitations] : palpitations [Orthopnea] : no orthopnea [PND] : no PND [Abdominal Pain] : no abdominal pain [Nausea] : no nausea [Vomiting] : no vomiting [Heartburn] : no heartburn [Dysuria] : no dysuria [Joint Pain] : joint pain [Rash] : no rash [Itching] : no itching [Dizziness] : no dizziness [Tremor] : no tremor was seen [Numbness (Hypoesthesia)] : no numbness [Tingling (Paresthesia)] : no tingling [Confusion] : no confusion was observed [Anxiety] : no anxiety [Under Stress] : not under stress [Easy Bleeding] : no tendency for easy bleeding [Easy Bruising] : no tendency for easy bruising [Negative] : Respiratory [FreeTextEntry9] : knee pain.

## 2023-12-14 NOTE — PHYSICAL EXAM
[No Carotid Bruit] : no carotid bruit [Normal S1, S2] : normal S1, S2 [No Rub] : no rub [Soft] : abdomen soft [Non Tender] : non-tender [Normal Bowel Sounds] : normal bowel sounds [Normal Gait] : normal gait [Normal] : alert and oriented, normal memory [de-identified] : 1/4 EDM in 2nd ICS.

## 2023-12-20 NOTE — ED PROVIDER NOTE - CARDIAC, MLM
Occupational Therapy    Patient not seen in therapy.     Unavailable due to request no therapy today.      Pt attempted for OT this am. Pt sleeping very soundly in bed with ex-/caregiver present. Therapist and ex- attempted to wake pt for therapy. Pt minimally responds but only to refuse therapy stating she is in too much pain. Encouraged to trial at least light activity but she does not respond to therapist anymore and goes back to sleep. Will continue OT efforts.                               Therapy procedure time and total treatment time can be found documented on the Time Entry flowsheet   Normal rate, regular rhythm.  Heart sounds S1, S2.  No murmurs, rubs or gallops.

## 2024-01-15 ENCOUNTER — EMERGENCY (EMERGENCY)
Facility: HOSPITAL | Age: 79
LOS: 1 days | Discharge: HOME | End: 2024-01-15
Attending: STUDENT IN AN ORGANIZED HEALTH CARE EDUCATION/TRAINING PROGRAM | Admitting: STUDENT IN AN ORGANIZED HEALTH CARE EDUCATION/TRAINING PROGRAM
Payer: MEDICARE

## 2024-01-15 VITALS
HEART RATE: 77 BPM | TEMPERATURE: 98 F | DIASTOLIC BLOOD PRESSURE: 81 MMHG | SYSTOLIC BLOOD PRESSURE: 171 MMHG | OXYGEN SATURATION: 100 % | RESPIRATION RATE: 16 BRPM

## 2024-01-15 DIAGNOSIS — Z98.890 OTHER SPECIFIED POSTPROCEDURAL STATES: Chronic | ICD-10-CM

## 2024-01-15 DIAGNOSIS — Z90.710 ACQUIRED ABSENCE OF BOTH CERVIX AND UTERUS: Chronic | ICD-10-CM

## 2024-01-15 LAB
ALBUMIN SERPL ELPH-MCNC: 3.6 G/DL — SIGNIFICANT CHANGE UP (ref 3.3–5)
ALBUMIN SERPL ELPH-MCNC: 3.6 G/DL — SIGNIFICANT CHANGE UP (ref 3.3–5)
ALP SERPL-CCNC: 51 U/L — SIGNIFICANT CHANGE UP (ref 40–120)
ALP SERPL-CCNC: 51 U/L — SIGNIFICANT CHANGE UP (ref 40–120)
ALT FLD-CCNC: 9 U/L — SIGNIFICANT CHANGE UP (ref 4–33)
ALT FLD-CCNC: 9 U/L — SIGNIFICANT CHANGE UP (ref 4–33)
ANION GAP SERPL CALC-SCNC: 11 MMOL/L — SIGNIFICANT CHANGE UP (ref 7–14)
ANION GAP SERPL CALC-SCNC: 11 MMOL/L — SIGNIFICANT CHANGE UP (ref 7–14)
AST SERPL-CCNC: 16 U/L — SIGNIFICANT CHANGE UP (ref 4–32)
AST SERPL-CCNC: 16 U/L — SIGNIFICANT CHANGE UP (ref 4–32)
BASOPHILS # BLD AUTO: 0.04 K/UL — SIGNIFICANT CHANGE UP (ref 0–0.2)
BASOPHILS # BLD AUTO: 0.04 K/UL — SIGNIFICANT CHANGE UP (ref 0–0.2)
BASOPHILS NFR BLD AUTO: 0.4 % — SIGNIFICANT CHANGE UP (ref 0–2)
BASOPHILS NFR BLD AUTO: 0.4 % — SIGNIFICANT CHANGE UP (ref 0–2)
BILIRUB SERPL-MCNC: 0.2 MG/DL — SIGNIFICANT CHANGE UP (ref 0.2–1.2)
BILIRUB SERPL-MCNC: 0.2 MG/DL — SIGNIFICANT CHANGE UP (ref 0.2–1.2)
BUN SERPL-MCNC: 13 MG/DL — SIGNIFICANT CHANGE UP (ref 7–23)
BUN SERPL-MCNC: 13 MG/DL — SIGNIFICANT CHANGE UP (ref 7–23)
CALCIUM SERPL-MCNC: 9.6 MG/DL — SIGNIFICANT CHANGE UP (ref 8.4–10.5)
CALCIUM SERPL-MCNC: 9.6 MG/DL — SIGNIFICANT CHANGE UP (ref 8.4–10.5)
CHLORIDE SERPL-SCNC: 105 MMOL/L — SIGNIFICANT CHANGE UP (ref 98–107)
CHLORIDE SERPL-SCNC: 105 MMOL/L — SIGNIFICANT CHANGE UP (ref 98–107)
CO2 SERPL-SCNC: 27 MMOL/L — SIGNIFICANT CHANGE UP (ref 22–31)
CO2 SERPL-SCNC: 27 MMOL/L — SIGNIFICANT CHANGE UP (ref 22–31)
CREAT SERPL-MCNC: 1.22 MG/DL — SIGNIFICANT CHANGE UP (ref 0.5–1.3)
CREAT SERPL-MCNC: 1.22 MG/DL — SIGNIFICANT CHANGE UP (ref 0.5–1.3)
EGFR: 45 ML/MIN/1.73M2 — LOW
EGFR: 45 ML/MIN/1.73M2 — LOW
EOSINOPHIL # BLD AUTO: 0.11 K/UL — SIGNIFICANT CHANGE UP (ref 0–0.5)
EOSINOPHIL # BLD AUTO: 0.11 K/UL — SIGNIFICANT CHANGE UP (ref 0–0.5)
EOSINOPHIL NFR BLD AUTO: 1.1 % — SIGNIFICANT CHANGE UP (ref 0–6)
EOSINOPHIL NFR BLD AUTO: 1.1 % — SIGNIFICANT CHANGE UP (ref 0–6)
GLUCOSE SERPL-MCNC: 131 MG/DL — HIGH (ref 70–99)
GLUCOSE SERPL-MCNC: 131 MG/DL — HIGH (ref 70–99)
HCT VFR BLD CALC: 39.2 % — SIGNIFICANT CHANGE UP (ref 34.5–45)
HCT VFR BLD CALC: 39.2 % — SIGNIFICANT CHANGE UP (ref 34.5–45)
HGB BLD-MCNC: 13.2 G/DL — SIGNIFICANT CHANGE UP (ref 11.5–15.5)
HGB BLD-MCNC: 13.2 G/DL — SIGNIFICANT CHANGE UP (ref 11.5–15.5)
IANC: 5.48 K/UL — SIGNIFICANT CHANGE UP (ref 1.8–7.4)
IANC: 5.48 K/UL — SIGNIFICANT CHANGE UP (ref 1.8–7.4)
IMM GRANULOCYTES NFR BLD AUTO: 0.6 % — SIGNIFICANT CHANGE UP (ref 0–0.9)
IMM GRANULOCYTES NFR BLD AUTO: 0.6 % — SIGNIFICANT CHANGE UP (ref 0–0.9)
LYMPHOCYTES # BLD AUTO: 3.6 K/UL — HIGH (ref 1–3.3)
LYMPHOCYTES # BLD AUTO: 3.6 K/UL — HIGH (ref 1–3.3)
LYMPHOCYTES # BLD AUTO: 35.8 % — SIGNIFICANT CHANGE UP (ref 13–44)
LYMPHOCYTES # BLD AUTO: 35.8 % — SIGNIFICANT CHANGE UP (ref 13–44)
MAGNESIUM SERPL-MCNC: 1.7 MG/DL — SIGNIFICANT CHANGE UP (ref 1.6–2.6)
MAGNESIUM SERPL-MCNC: 1.7 MG/DL — SIGNIFICANT CHANGE UP (ref 1.6–2.6)
MCHC RBC-ENTMCNC: 30.7 PG — SIGNIFICANT CHANGE UP (ref 27–34)
MCHC RBC-ENTMCNC: 30.7 PG — SIGNIFICANT CHANGE UP (ref 27–34)
MCHC RBC-ENTMCNC: 33.7 GM/DL — SIGNIFICANT CHANGE UP (ref 32–36)
MCHC RBC-ENTMCNC: 33.7 GM/DL — SIGNIFICANT CHANGE UP (ref 32–36)
MCV RBC AUTO: 91.2 FL — SIGNIFICANT CHANGE UP (ref 80–100)
MCV RBC AUTO: 91.2 FL — SIGNIFICANT CHANGE UP (ref 80–100)
MONOCYTES # BLD AUTO: 0.77 K/UL — SIGNIFICANT CHANGE UP (ref 0–0.9)
MONOCYTES # BLD AUTO: 0.77 K/UL — SIGNIFICANT CHANGE UP (ref 0–0.9)
MONOCYTES NFR BLD AUTO: 7.7 % — SIGNIFICANT CHANGE UP (ref 2–14)
MONOCYTES NFR BLD AUTO: 7.7 % — SIGNIFICANT CHANGE UP (ref 2–14)
NEUTROPHILS # BLD AUTO: 5.48 K/UL — SIGNIFICANT CHANGE UP (ref 1.8–7.4)
NEUTROPHILS # BLD AUTO: 5.48 K/UL — SIGNIFICANT CHANGE UP (ref 1.8–7.4)
NEUTROPHILS NFR BLD AUTO: 54.4 % — SIGNIFICANT CHANGE UP (ref 43–77)
NEUTROPHILS NFR BLD AUTO: 54.4 % — SIGNIFICANT CHANGE UP (ref 43–77)
NRBC # BLD: 0 /100 WBCS — SIGNIFICANT CHANGE UP (ref 0–0)
NRBC # BLD: 0 /100 WBCS — SIGNIFICANT CHANGE UP (ref 0–0)
NRBC # FLD: 0 K/UL — SIGNIFICANT CHANGE UP (ref 0–0)
NRBC # FLD: 0 K/UL — SIGNIFICANT CHANGE UP (ref 0–0)
PLATELET # BLD AUTO: 261 K/UL — SIGNIFICANT CHANGE UP (ref 150–400)
PLATELET # BLD AUTO: 261 K/UL — SIGNIFICANT CHANGE UP (ref 150–400)
POTASSIUM SERPL-MCNC: 3.8 MMOL/L — SIGNIFICANT CHANGE UP (ref 3.5–5.3)
POTASSIUM SERPL-MCNC: 3.8 MMOL/L — SIGNIFICANT CHANGE UP (ref 3.5–5.3)
POTASSIUM SERPL-SCNC: 3.8 MMOL/L — SIGNIFICANT CHANGE UP (ref 3.5–5.3)
POTASSIUM SERPL-SCNC: 3.8 MMOL/L — SIGNIFICANT CHANGE UP (ref 3.5–5.3)
PROT SERPL-MCNC: 7.1 G/DL — SIGNIFICANT CHANGE UP (ref 6–8.3)
PROT SERPL-MCNC: 7.1 G/DL — SIGNIFICANT CHANGE UP (ref 6–8.3)
RBC # BLD: 4.3 M/UL — SIGNIFICANT CHANGE UP (ref 3.8–5.2)
RBC # BLD: 4.3 M/UL — SIGNIFICANT CHANGE UP (ref 3.8–5.2)
RBC # FLD: 13.4 % — SIGNIFICANT CHANGE UP (ref 10.3–14.5)
RBC # FLD: 13.4 % — SIGNIFICANT CHANGE UP (ref 10.3–14.5)
SODIUM SERPL-SCNC: 143 MMOL/L — SIGNIFICANT CHANGE UP (ref 135–145)
SODIUM SERPL-SCNC: 143 MMOL/L — SIGNIFICANT CHANGE UP (ref 135–145)
TROPONIN T, HIGH SENSITIVITY RESULT: 12 NG/L — SIGNIFICANT CHANGE UP
TROPONIN T, HIGH SENSITIVITY RESULT: 12 NG/L — SIGNIFICANT CHANGE UP
WBC # BLD: 10.06 K/UL — SIGNIFICANT CHANGE UP (ref 3.8–10.5)
WBC # BLD: 10.06 K/UL — SIGNIFICANT CHANGE UP (ref 3.8–10.5)
WBC # FLD AUTO: 10.06 K/UL — SIGNIFICANT CHANGE UP (ref 3.8–10.5)
WBC # FLD AUTO: 10.06 K/UL — SIGNIFICANT CHANGE UP (ref 3.8–10.5)

## 2024-01-15 PROCEDURE — 99285 EMERGENCY DEPT VISIT HI MDM: CPT | Mod: 25

## 2024-01-15 PROCEDURE — 70498 CT ANGIOGRAPHY NECK: CPT | Mod: 26,MB

## 2024-01-15 PROCEDURE — 71046 X-RAY EXAM CHEST 2 VIEWS: CPT | Mod: 26

## 2024-01-15 PROCEDURE — 70496 CT ANGIOGRAPHY HEAD: CPT | Mod: 26,MB

## 2024-01-15 RX ORDER — METOCLOPRAMIDE HCL 10 MG
10 TABLET ORAL ONCE
Refills: 0 | Status: COMPLETED | OUTPATIENT
Start: 2024-01-15 | End: 2024-01-15

## 2024-01-15 RX ORDER — ACETAMINOPHEN 500 MG
975 TABLET ORAL ONCE
Refills: 0 | Status: COMPLETED | OUTPATIENT
Start: 2024-01-15 | End: 2024-01-15

## 2024-01-15 RX ORDER — SODIUM CHLORIDE 9 MG/ML
500 INJECTION INTRAMUSCULAR; INTRAVENOUS; SUBCUTANEOUS ONCE
Refills: 0 | Status: COMPLETED | OUTPATIENT
Start: 2024-01-15 | End: 2024-01-15

## 2024-01-15 RX ADMIN — Medication 10 MILLIGRAM(S): at 04:18

## 2024-01-15 RX ADMIN — SODIUM CHLORIDE 500 MILLILITER(S): 9 INJECTION INTRAMUSCULAR; INTRAVENOUS; SUBCUTANEOUS at 04:19

## 2024-01-15 RX ADMIN — Medication 975 MILLIGRAM(S): at 04:19

## 2024-01-15 NOTE — ED PROVIDER NOTE - PHYSICAL EXAMINATION
Gen: AAOx3, non-toxic, well appearing, no acute distress  Head: NCAT  HEENT: EOMI, oral mucosa moist, normal conjunctiva  Lung: CTAB, no respiratory distress, no wheezes/rhonchi/rales B/L,   CV: RRR, no murmurs, rubs or gallops  Abd: soft, NTND, no guarding, no CVA tenderness  MSK: no visible deformities  Neuro: No focal sensory or motor deficits  Skin: Warm, well perfused, no rash  Psych: normal affect.

## 2024-01-15 NOTE — ED PROVIDER NOTE - ATTENDING CONTRIBUTION TO CARE
I performed a history and physical exam of the patient and discussed their management with the resident/fellow/ACP/student. I have reviewed the resident/fellow/ACP/student note and agree with the documented findings and plan of care, except as noted. I have personally performed a substantive portion of the visit including all aspects of the medical decision making. My medical decision making and observations are found above. Please refer to any progress notes for updates on clinical course.    79-year-old female with pmhx of of hypertension, DM2, osteoarthritis, chronic headaches, chronic palpitations Presenting for worsening palpitations and headaches     presents to the ED complaining of worsening palpitations with associated headaches for 1 day.  Headaches described as frontotemporal bilateral radiating across forehead.  Not maximal at onset, no associated blurry vision, diplopia, neck stiffness. Not worse in the morning.   States that she has had headaches which she was seen by neurologist.  States that she had an MRI done 2 weeks ago, results still pending. Patient felt palpitations this morning however does not actively endorse palpitations.  Was seen by cardiologist had recent echo which returned normal.  States that she has also had a Holter monitor years ago which returned normal according to patient. Denies fevers, chills, nausea, vomiting, dizziness, chest pain, SOB, abdominal pain, dysuria, hematuria. I performed a history and physical exam of the patient and discussed their management with the resident/fellow/ACP/student. I have reviewed the resident/fellow/ACP/student note and agree with the documented findings and plan of care, except as noted. I have personally performed a substantive portion of the visit including all aspects of the medical decision making. My medical decision making and observations are found above. Please refer to any progress notes for updates on clinical course.    79-year-old female with pmhx of hypertension, DM2, osteoarthritis, chronic headaches, chronic palpitations presenting for worsening palpitations and headaches X 1 day.  Headache is bilateral in nature with intermittent dizziness with no associated changes in vision/hearing, focal weakness, numbness/tingling, LOC, neck pain/stiffness with no recent fall/trauma.  States that she was seen by her neurologist recently and had MRI done 2 weeks ago and was called back to discuss results today but does not know the results and was unable to go to appointment due to level of pain.  Patient also reports palpitations this morning similar to chronic palpitations in past.  Seen by cardiologist in December for palpitations and had echocardiogram performed with no acute abnormalities.  Denies any chest pain, shortness of breath, diaphoresis, leg swelling/pain, hemoptysis, hormone use, recent travel/bedbound nature, history of blood clots, fever/chills, N/V/D, cough, rashes, or change in urination    Gen: WDWN, NAD, comfortable appearing, afebrile   HEENT: Atraumatic head, PERRLA, EOMI, no nasal discharge, mucous membranes moist  CV: RRR, +S1/S2, no M/R/G, equal b/l radial pulses 2+  Resp: CTAB, no W/R/R, SPO2 >95% on RA, no increased WOB   GI: Abdomen soft non-distended, NTTP, no masses/organomegaly   MSK/Skin: No CVA tenderness, no open wounds, no bruising, no LE edema/calf tenderness   Neuro: CN2-12 grossly intact, A&Ox4, MS +5/5 in UE and LE BL, finger to nose smooth and rapid, gross sensation intact in UE and LE BL, gait smooth and coordinated, negative pronator drift   Psych: appropriate mood    MDM:   79-year-old female with pmhx of hypertension, DM2, osteoarthritis, chronic headaches, chronic palpitations presenting for worsening palpitations and headaches X 1 day, intermittent dizziness with no falls/trauma, afebrile with no infectious symptoms, no FND, recent workup by cardiologist for palpitations and recent MRIs by neurologist for headache but unclear results of MRI.  Exam/history concerning for but not limited to anemia versus metabolic derangement versus arrhythmia versus ACS versus intracranial abnormality.  Will perform CTA head/neck given unclear results of MRI, basic labs, EKG, cardiac enzymes, monitor on telemetry and continue reassess

## 2024-01-15 NOTE — ED PROVIDER NOTE - PATIENT PORTAL LINK FT
You can access the FollowMyHealth Patient Portal offered by [facility] by registering at the following website: [email or web address]. By joining [employer]’s FollowMyHealth portal, you will also be able to view your health information using other applications (apps) compatible with our system. You can access the FollowMyHealth Patient Portal offered by [facility] by registering at the following website: [email or web address]. By joining [employer]’s FollowMyHealth portal, you will also be able to view your health information using other applications (apps) compatible with our system. You can access the FollowMyHealth Patient Portal offered by [facility] by registering at the following website: [email or web address]. By joining [employer]’s FollowMyHealth portal, you will also be able to view your health information using other applications (apps) compatible with our system.

## 2024-01-15 NOTE — ED PROVIDER NOTE - CLINICAL SUMMARY MEDICAL DECISION MAKING FREE TEXT BOX
79-year-old female past medical history of hypertension, diabetes, osteoarthritis, chronic headaches, palpitations presents to the ED complaining of worsening palpitations with associated headaches for 1 day.  Headaches described as frontotemporal bilateral radiating across forehead.  Not maximal at onset, no associated blurry vision, diplopia, neck stiffness. Not worse in the morning.   States that she has had headaches which she was seen by neurologist.  States that she had an MRI done 2 weeks ago, results still pending. Patient felt palpitations this morning however does not actively endorse palpitations.  Was seen by cardiologist had recent echo which returned normal.  States that she has also had a Holter monitor years ago which returned normal according to patient. Denies fevers, chills, nausea, vomiting, dizziness, chest pain, SOB, abdominal pain, dysuria, hematuria.     VSS. Clinically stable. EKG showing 79-year-old female past medical history of hypertension, diabetes, osteoarthritis, chronic headaches, palpitations presents to the ED complaining of worsening palpitations with associated weakness and headaches for 1 day.  Headaches described as frontotemporal bilateral radiating across forehead.  Not maximal at onset, no associated blurry vision, diplopia, neck stiffness. Not worse in the morning.   States that she has had headaches which she was seen by neurologist.  States that she had an MRI done 2 weeks ago, results still pending. Patient felt palpitations this morning however does not actively endorse palpitations.  Was seen by cardiologist had recent echo which returned normal.  States that she has also had a Holter monitor years ago which returned normal according to patient. Denies fevers, chills, nausea, vomiting, dizziness, chest pain, SOB, abdominal pain, dysuria, hematuria.     VSS. Clinically stable. EKG showing. Well appearing, no acute distress. NCAT, EOMI, ISAI, LCTAB, no mrg, abd NDNT, neurovascularly intact, no focal neuro deficits, no pronator drift. no bruising, echymosis, or rashes. Suspicion for HA vs migraines vs. paroxysmal arythmias. Will assess w CTA given persistent headaches, headache cocktail, basic labs, cxr. dispo pending imaging, labs. 79-year-old female past medical history of hypertension, diabetes, osteoarthritis, chronic headaches, palpitations presents to the ED complaining of worsening palpitations with associated weakness and headaches for 1 day.  Headaches described as frontotemporal bilateral radiating across forehead.  Not maximal at onset, no associated blurry vision, diplopia, neck stiffness. Not worse in the morning.   States that she has had headaches which she was seen by neurologist.  States that she had an MRI done 2 weeks ago, results still pending. Patient felt palpitations this morning however does not actively endorse palpitations.  Was seen by cardiologist had recent echo which returned normal.  States that she has also had a Holter monitor years ago which returned normal according to patient. Denies fevers, chills, nausea, vomiting, dizziness, chest pain, SOB, abdominal pain, dysuria, hematuria.     VSS. Clinically stable. EKG showing. Well appearing, no acute distress. NCAT, EOMI, ISAI, LCTAB, no mrg, abd NDNT, neurovascularly intact, no focal neuro deficits, no pronator drift. no bruising, echymosis, or rashes. Suspicion for HA vs migraines vs. paroxysmal arythmias. Will assess w CTA given persistent headaches, headache cocktail, basic labs, cxr. dispo pending imaging, labs.    Eder Attending  See Attestation

## 2024-01-15 NOTE — ED PROVIDER NOTE - NSFOLLOWUPINSTRUCTIONS_ED_ALL_ED_FT
You were seen in the ED for palpitations.  Your evaluated with an EKG, a chest x-ray, blood work, CT angiogram which returned negative.  You are given fluids and Reglan for your symptoms with mild improvement.  Your CAT scan results returned negative.  No further acute intervention is required in the ED.  You should follow-up with your neurologist to follow-up with the MRI results you had 2 weeks ago.  You should follow-up with your cardiologist in 24-48 hours for your chronic palpitations.      SEEK IMMEDIATE MEDICAL CARE IF YOU HAVE ANY OF THE FOLLOWING SYMPTOMS: chest pain, shortness of breath, severe headache, dizziness/lightheadedness, fainting, fever, vomiting, stiff neck, loss of vision, problems with speech, muscle weakness, loss of balance, trouble walking, passing out, or confusion.    Palpitations    A palpitation is the feeling that your heartbeat is irregular or is faster than normal. It may feel like your heart is fluttering or skipping a beat. They may be caused by many things, including smoking, caffeine, alcohol, stress, and certain medicines. Although most causes of palpitations are not serious, palpitations can be a sign of a serious medical problem. Avoid caffeine, alcohol, and tobacco products at home. Try to reduce stress and anxiety and make sure to get plenty of rest.     Headache    A headache is pain or discomfort felt around the head or neck area. The specific cause of a headache may not be found as there are many types including tension headaches, migraine headaches, and cluster headaches. Watch your condition for any changes. Things you can do to manage your pain include taking over the counter and prescription medications as instructed by your health care provider, lying down in a dark quiet room, limiting stress, getting regular sleep, and refraining from alcohol and tobacco products.

## 2024-01-15 NOTE — ED ADULT TRIAGE NOTE - CHIEF COMPLAINT QUOTE
Pt c/o palpitations x 3 days. Endorses dizziness and headache x 3 days, Denies fever, chills, arm pain, nausea, sob, vomiting Past Medical History: HTN, DM2

## 2024-01-15 NOTE — ED PROVIDER NOTE - PROGRESS NOTE DETAILS
Willard Marquis, PGY1    Pt symptoms improved after medications. Ambulated with steady gait. Stable for DC. Return precautions given. Stated she will fu w neurologist and cardiologist.

## 2024-01-15 NOTE — ED PROVIDER NOTE - OBJECTIVE STATEMENT
79-year-old female past medical history of hypertension, diabetes, osteoarthritis, chronic headaches, palpitations presents to the ED complaining of worsening palpitations with associated headaches for 1 day.  Headaches described as frontotemporal bilateral radiating across forehead.  Not maximal at onset, no associated blurry vision, diplopia, neck stiffness. Not worse in the morning.   States that she has had headaches which she was seen by neurologist.  States that she had an MRI done 2 weeks ago, results still pending. Patient felt palpitations this morning however does not actively endorse palpitations.  Was seen by cardiologist had recent echo which returned normal.  States that she has also had a Holter monitor years ago which returned normal according to patient. Denies fevers, chills, nausea, vomiting, dizziness, chest pain, SOB, abdominal pain, dysuria, hematuria.

## 2024-03-09 NOTE — H&P PST ADULT - MUSCULOSKELETAL
Patient will continue to monitor her chest pain.  This is not thought to be cardiac.  I think this is likely musculoskeletal versus GI.   details… detailed exam decreased ROM

## 2024-04-11 NOTE — ASU PREOP CHECKLIST - WEIGHT IN LBS
20 year old  at 16w2d presenting for RPNV. No additional concerns today. Denies ctx, LOF, VB.    O: See prenatal flowsheet  Vitals:    24 1331   BP: 114/70       Return in about 4 weeks (around 5/10/2024).   -Reviewed anatomy US, limitations and expectations. Scheduled for     Christina Cruz MD     190

## 2024-04-25 ENCOUNTER — APPOINTMENT (OUTPATIENT)
Dept: CARDIOLOGY | Facility: CLINIC | Age: 79
End: 2024-04-25
Payer: MEDICARE

## 2024-04-25 ENCOUNTER — NON-APPOINTMENT (OUTPATIENT)
Age: 79
End: 2024-04-25

## 2024-04-25 VITALS
SYSTOLIC BLOOD PRESSURE: 130 MMHG | DIASTOLIC BLOOD PRESSURE: 77 MMHG | BODY MASS INDEX: 34.78 KG/M2 | OXYGEN SATURATION: 96 % | TEMPERATURE: 98 F | RESPIRATION RATE: 15 BRPM | HEART RATE: 67 BPM | HEIGHT: 62 IN | WEIGHT: 189 LBS

## 2024-04-25 DIAGNOSIS — I25.10 ATHEROSCLEROTIC HEART DISEASE OF NATIVE CORONARY ARTERY W/OUT ANGINA PECTORIS: ICD-10-CM

## 2024-04-25 DIAGNOSIS — E78.00 PURE HYPERCHOLESTEROLEMIA, UNSPECIFIED: ICD-10-CM

## 2024-04-25 DIAGNOSIS — I10 ESSENTIAL (PRIMARY) HYPERTENSION: ICD-10-CM

## 2024-04-25 PROCEDURE — 99214 OFFICE O/P EST MOD 30 MIN: CPT | Mod: 25

## 2024-04-25 PROCEDURE — 93000 ELECTROCARDIOGRAM COMPLETE: CPT

## 2024-04-25 NOTE — PHYSICAL EXAM
[No Carotid Bruit] : no carotid bruit [Normal S1, S2] : normal S1, S2 [No Rub] : no rub [Soft] : abdomen soft [Non Tender] : non-tender [Normal Bowel Sounds] : normal bowel sounds [Normal Gait] : normal gait [Normal] : alert and oriented, normal memory [de-identified] : 1/4 EDM in 2nd ICS.

## 2024-04-25 NOTE — HISTORY OF PRESENT ILLNESS
[FreeTextEntry1] : Lianna Funk is an 79-year-old female with Hypertension, heart murmur, hypercholesterolemia, diabetes and non-obstructive CAD comes for follow up visit. Denies any chest pain or shortness of breath. No Palpitations. Complaint to medications and diet. Follows up with PCP.

## 2024-04-25 NOTE — DISCUSSION/SUMMARY
Patient:   JUICE ORTIZ            MRN: [id number]            FIN: [id number]              Age:   61 years     Sex:  FEMALE     :  54   Associated Diagnoses:   None   Author:   MIKY GUO     Chief Complaint   _cervical radiculopathy, neck pain     History of Present Illness   pain score 10/10      Review of Systems   Constitutional:  Negative except as documented in history of present illness.   Eye:  Negative except as documented in history of present illness.   Ear/Nose/Mouth/Throat:  Negative except as documented in history of present illness.   Cardiovascular:  Negative except as documented in history of present illness.   Respiratory:  Negative except as documented in history of present illness.   Gastrointestinal:  Negative except as documented in history of present illness.   Genitourinary:  Negative except as documented in history of present illness.   Musculoskeletal:  Negative except as documented in history of present illness.   Integumentary:  Negative except as documented in history of present illness.   Hematology/Lymphatics:  Negative except as documented in history of present illness.   Neurologic:  Negative except as documented in history of present illness.   Endocrine:  Negative except as documented in history of present illness.   Psychiatric:  Negative except as documented in history of present illness.      Health Status   Allergies: Allergies (1) Active Reaction  NKA None Documented      Current medications: Home Medications (13) Active  amitriptyline oral 10 mg tablet 10 mg = 1 tab, Oral, Q Bedtime  atorvastatin oral 40 mg tablet 40 mg = 1 tab, Oral, Q Bedtime  baclofen oral 10 mg tablet (Lioresal) 10 mg = 1 tab, PRN, Oral, Q6H  Dulcolax Laxative (bisacodyl) rectal 10 mg suppository 10 mg = 1 suppository, PRN, Rectal, Daily  escitalopram oral 10 mg tablet 10 mg = 1 tab, Oral, Q Bedtime  ferrous sulfate oral 325 mg tablet [65 mg iron] (Feosol) 325 mg = 1 tab, Oral, TID  [FreeTextEntry1] : In a summary Lianna Funk is an elderly female with Hypertension, controlled. Continue Amlodipine, Losartan- HCTZ, Atenolol and 2 gm sodium diet. Hypercholesterolemia, continue Pravachol and low cholesterol diet. LDL goal less than 70 g/dL. Diabetes, on medications and low Carb diet. Non obstructive CAD, continue Aspirin. Palpitations, continue Atenolol. Continue healthy lifestyle. Follow up in 4 months.  [with meals]  gabapentin 600 mg/24 hours oral tablet, extended release 600 mg = 1 tab, Oral, TID  glipiZIDE oral 5 mg XL tablet 5 mg = 1 tab, Oral, Daily  metFORMIN 1,000 mg, Oral, BID  Norco oral 325-7.5 mg tablet 1 tab, PRN, Oral, Daily  Pepcid oral 20 mg tablet 20 mg = 1 tab, Oral, Q12H  Senokot S oral 50-8.6 mg tablet 2 tab, Oral, Daily  Tums (carbonate) oral 500 mg chewable tablet [elemental Ca 200 mg] 500 mg = 1 tab, PRN, Chewed, TID       Histories   Past Med History: Arthritis  Bronchitis  Bronchitis  Chronic pain  DVT (deep venous thrombosis)  Diabetes  HTN (hypertension)  Risk factors for obstructive sleep apnea  Spinal stenosis      Family History:    Diabetes mellitus type 2  MOTHER  CA - Breast cancer  SISTER  Hypertension  MOTHER    Procedure History: TPI - Trigger point injection: 09/27/19  Pain consultation: 08/01/19  Pain consultation: 01/07/19  Cervical epidural steroid injection: 10/13/17  Cervical epidural steroid injection: 05/09/17  Epidural injection of cervical spine using fluoroscopic guidance: 03/03/17  Cervical epidural steroid injection: 05/27/16  Cervical epidural steroid injection: 04/22/16  Cervical epidural steroid injection: 03/21/16  Injection: 12/15/15  Injection, Left: 12/04/15  Epidural injection of cervical spine using fluoroscopic guidance: 10/02/15  Cervical epidural steroid injection: 08/28/15  Epidural injection of cervical spine using fluoroscopic guidance: 06/29/15  Pain consultation: 06/19/15  Hysterectomy  Arthroscopic knee operation  Foot repair      Social History       Alcohol  Details: Use: None.  Exercise  Details: Excercise: Never.  Home/Environment  Details: Alcohol Abuse in Household: No.  Substance Abuse in Household: No.  Smoker in Household: No.  Substance Abuse  Details: Use: Current.  Type: Marijuana.  Frequency: 1-2 times per year.  Tobacco  Details: Smoker in Houshold: No.  Smoked/Smokeless Tobacco Last 30 Days: No.  Smoking Tobacco Use: Former smoker.   Smokeless Tobacco Use Never.  Details: Used in Last 12 Months: Yes.  Use: Current some day smoker.  Cigarette Packs/Day: a couple cigarettes every week.  Cultural/Jain Practices  Details: Jain or Cultural Practices: Jainism.  Jain or Cultural Practices While in Hospital: No.  .       Physical Examination   Vital Signs:   Vitals between:   26-SEP-2019 17:40:55   TO   27-SEP-2019 17:40:55                   LAST RESULT MINIMUM MAXIMUM  Heart Rate 75 73 75  Respiratory Rate 16 16 16  NISBP           140 140 143  NIDBP           65 65 83  NIMBP           90 90 103      Pain assessment:  Clinic Assessment:.      Patient has well-healed surgical incision in the posterior aspect of the neck.  Negative Za.  Restricted range of motion of cervical spine and lateral rotation flexion and extension due to discomfort.  5/5 strength in extremities.  Patient has multiple trigger points tender to palpation in bilateral trapezius muscles.              Review / Management   Documentation reviewed:  Reviewed prior records.    Radiological evidence of substantial imaging abnormality is present in the medical record:  [_]Central disc herniation  [x_]Severe degenerative disc disease  [_]Central spinal stenosis    Patient has failed non surgical, non-injection care for four weeks (from onset of pain) including:  [x_]Appropriate oral mediations: _  [_]Physical therapy to the extent tolerated    Patient is excluded from the 4 week waiting period due to:  [_]Pain from Herpes Zoster  [_]Has Moderate pain with significant functional loss at work or home  [x_]Severe pain unresponsive to outpatient medical management  [_]Inability to tolerate non-surgical, non-injection care due to co-existing medical condition(s)  [_]Prior successful injections for same specific condition with relief of at least 3 months duration     Impression and Plan     Patient is a 61-year-old female who presents to the pain clinic for injection.   Patient had a series of cervical epidural steroid injections in the past and ended up having decompressive cervical and thoracic laminectomy C4-T2 in March 2018.    Clinical diagnosis: Post cervical laminectomy pain  myofascial pain    Plan: Trapezius trigger point injections

## 2024-04-25 NOTE — REVIEW OF SYSTEMS
[Blurry Vision] : no blurred vision [Dyspnea on exertion] : not dyspnea during exertion [Chest Discomfort] : no chest discomfort [Lower Ext Edema] : no extremity edema [Palpitations] : no palpitations [Orthopnea] : no orthopnea [PND] : no PND [Abdominal Pain] : no abdominal pain [Nausea] : no nausea [Vomiting] : no vomiting [Heartburn] : no heartburn [Dysuria] : no dysuria [Joint Pain] : joint pain [Rash] : no rash [Itching] : no itching [Dizziness] : no dizziness [Tremor] : no tremor was seen [Numbness (Hypoesthesia)] : no numbness [Tingling (Paresthesia)] : no tingling [Confusion] : no confusion was observed [Anxiety] : no anxiety [Under Stress] : not under stress [Easy Bleeding] : no tendency for easy bleeding [Easy Bruising] : no tendency for easy bruising [Negative] : Respiratory [FreeTextEntry9] : knee pain.

## 2024-06-15 NOTE — H&P PST ADULT - DOES PATIENT HAVE ADVANCE DIRECTIVE
Patient : Marlen Fisher Age: 22 year old Sex: female   MRN: [id number] Encounter Date: 6/15/2024    History     Chief Complaint   Patient presents with    Flu Like Symptoms       HPI    Marlen Fisher is a 22 year old presenting to the emergency department with URI symptoms for roughly a week.  Patient states that she has a cough, sore throat, headache, bilateral ear pain, and congestion.  Was seen at urgent care and diagnosed with conjunctivitis and viral URI few days ago.  Patient works at a .  Has known sick contacts.  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  She was told that 1 tonsil was larger than the other at urgent care.  States that she has pain with swallowing, however, is tolerating her own secretions.  Denies any fevers.  Mom states that she noticed 1 tonsil larger than the other. States that it is touching the uvula.    I have reviewed Marlen Fisher's previous urgent care note from 06/12/2024 .  Note Review Summary:   1. Acute bacterial conjunctivitis of right eye  -     moxifloxacin (VIGAMOX) 0.5 % ophthalmic solution; Place 1 drop into right eye in the morning and 1 drop at noon and 1 drop in the evening.  2. Viral URI  3. Acute tonsillitis, unspecified etiology    Past/Family/Social History     Allergies   Allergen Reactions    Benadryl [Diphenhydramine] ANXIETY    Fluoxetine HIVES       No current facility-administered medications for this encounter.     Current Outpatient Medications   Medication Sig    benzonatate (TESSALON PERLES) 100 MG capsule Take 1 capsule by mouth 3 times daily as needed for Cough.    moxifloxacin (VIGAMOX) 0.5 % ophthalmic solution Place 1 drop into right eye in the morning and 1 drop at noon and 1 drop in the evening.    cetirizine (ZyrTEC) 10 MG tablet Take 10 mg by mouth daily. (Patient not taking: Reported on 6/12/2024)    Multiple Vitamins-Minerals (WOMENS MULTI PO)  (Patient not taking: Reported on 6/12/2024)     HYDROCORTISONE 5 MG PO TABS 15mg every morning and 5mg every afternoon       Past Medical History:   Diagnosis Date    No known problems        Past Surgical History:   Procedure Laterality Date    Cardiac surgery      Eye surgery         No family history on file.    Social History     Tobacco Use    Smoking status: Never    Smokeless tobacco: Never   Substance Use Topics    Alcohol use: No    Drug use: No          Review of Systems   Review of Symptoms     Review of Systems   Constitutional:  Negative for chills and fever.   HENT:  Positive for congestion, ear pain (bilateral), rhinorrhea and sore throat. Negative for trouble swallowing and voice change.    Respiratory:  Positive for cough. Negative for shortness of breath.    Cardiovascular:  Negative for chest pain.   Gastrointestinal:  Negative for abdominal pain, nausea and vomiting.   Neurological:  Positive for headaches.          Physical Exam   Physical Exam     ED Triage Vitals [06/15/24 1236]   ED Triage Vitals Group      Temp 98.2 °F (36.8 °C)      Heart Rate (!) 102      Resp 18      BP (!) 154/86      SpO2 98 %      EtCO2 mmHg       Height 5' 1\" (1.549 m)      Weight 247 lb 1.6 oz (112.1 kg)      Weight Scale Used Standing scale      BMI (Calculated) 46.69      IBW/kg (Calculated) 47.8       Physical Exam  Constitutional:       General: She is not in acute distress.     Appearance: Normal appearance. She is not ill-appearing, toxic-appearing or diaphoretic.   HENT:      Head: Normocephalic and atraumatic.      Right Ear: Tympanic membrane, ear canal and external ear normal. There is no impacted cerumen.      Left Ear: Tympanic membrane, ear canal and external ear normal. There is no impacted cerumen.      Nose: Nose normal. No congestion or rhinorrhea.      Mouth/Throat:      Mouth: Mucous membranes are moist.      Pharynx: Oropharynx is clear. Posterior oropharyngeal erythema present. No oropharyngeal exudate.      Comments: Bilateral tonsillar  enlargement symmetrically.  Uvula is midline.  Eyes:      General:         Right eye: No discharge.         Left eye: No discharge.      Conjunctiva/sclera: Conjunctivae normal.      Pupils: Pupils are equal, round, and reactive to light.   Cardiovascular:      Rate and Rhythm: Normal rate and regular rhythm.      Heart sounds: Normal heart sounds. No murmur heard.     No friction rub. No gallop.   Pulmonary:      Effort: Pulmonary effort is normal. No respiratory distress.      Breath sounds: Normal breath sounds. No stridor. No wheezing, rhonchi or rales.   Abdominal:      General: Abdomen is flat. There is no distension.      Palpations: Abdomen is soft. There is no mass.      Tenderness: There is no abdominal tenderness. There is no guarding or rebound.      Hernia: No hernia is present.   Musculoskeletal:      Cervical back: Normal range of motion. No rigidity.   Lymphadenopathy:      Cervical: No cervical adenopathy.   Neurological:      Mental Status: She is alert.   Psychiatric:         Mood and Affect: Mood normal.            Procedures   ED Procedures     Procedures     Lab Results   ED Lab     Results for orders placed or performed during the hospital encounter of 06/15/24   Comprehensive Metabolic Panel   Result Value Ref Range    Fasting Status      Sodium 140 135 - 145 mmol/L    Potassium 3.4 3.4 - 5.1 mmol/L    Chloride 111 (H) 97 - 110 mmol/L    Carbon Dioxide 26 21 - 32 mmol/L    Anion Gap 6 (L) 7 - 19 mmol/L    Glucose 85 70 - 99 mg/dL    BUN 6 6 - 20 mg/dL    Creatinine 0.95 0.51 - 0.95 mg/dL    Glomerular Filtration Rate 86 >=60    BUN/Cr 6 (L) 7 - 25    Calcium 9.5 8.4 - 10.2 mg/dL    Bilirubin, Total 0.5 0.2 - 1.0 mg/dL    GOT/AST 29 <=37 Units/L    GPT/ALT 36 <64 Units/L    Alkaline Phosphatase 74 45 - 117 Units/L    Albumin 3.7 3.6 - 5.1 g/dL    Protein, Total 8.3 (H) 6.4 - 8.2 g/dL    Globulin 4.6 (H) 2.0 - 4.0 g/dL    A/G Ratio 0.8 (L) 1.0 - 2.4   Procalcitonin   Result Value Ref Range     Procalcitonin <0.05 <=0.09 ng/mL   COVID/Flu/RSV panel   Result Value Ref Range    Rapid SARS-COV-2 by PCR Not Detected Not Detected / Detected / Presumptive Positive / Inhibitors present    Influenza A by PCR Not Detected Not Detected    Influenza B by PCR Not Detected Not Detected    RSV BY PCR Not Detected Not Detected    Isolation Guidelines      Procedural Comment     CBC with Automated Differential (performable only)   Result Value Ref Range    WBC 12.2 (H) 4.2 - 11.0 K/mcL    RBC 4.59 4.00 - 5.20 mil/mcL    HGB 11.4 (L) 12.0 - 15.5 g/dL    HCT 37.3 36.0 - 46.5 %    MCV 81.3 78.0 - 100.0 fl    MCH 24.8 (L) 26.0 - 34.0 pg    MCHC 30.6 (L) 32.0 - 36.5 g/dL    RDW-CV 15.7 (H) 11.0 - 15.0 %    RDW-SD 45.7 39.0 - 50.0 fL     (H) 140 - 450 K/mcL    NRBC 0 <=0 /100 WBC    Neutrophil, Percent 58 %    Lymphocytes, Percent 32 %    Mono, Percent 6 %    Eosinophils, Percent 3 %    Basophils, Percent 1 %    Immature Granulocytes 0 %    Absolute Neutrophils 7.0 1.8 - 7.7 K/mcL    Absolute Lymphocytes 4.0 1.0 - 4.8 K/mcL    Absolute Monocytes 0.8 0.3 - 0.9 K/mcL    Absolute Eosinophils  0.4 0.0 - 0.5 K/mcL    Absolute Basophils 0.1 0.0 - 0.3 K/mcL    Absolute Immature Granulocytes 0.1 0.0 - 0.2 K/mcL   Monospot Without Reflex   Result Value Ref Range    Mononucleosis Screen Negative Negative   Streptococcus group A PCR    Specimen: Throat; Swab   Result Value Ref Range    STREPTOCOCCUS GROUP A PCR Not Detected Not Detected          EKG         Radiology Results    ED Radiology Results     Imaging Results              XR CHEST AP OR PA (Final result)  Result time 06/15/24 16:21:30      Final result                   Impression:    IMPRESSION: No acute cardiopulmonary disease. No interval change.      Electronically Signed by: Rajat Kilpatrick MD  Signed on: 6/15/2024 4:21 PM  Created on Workstation ID: [id number]  Signed on Workstation ID: [id number]               Narrative:      HISTORY: cough for 6 days    EXAM: AP  chest x-ray    COMPARISON: January 18, 2016    FINDINGS: The lungs are clear, with sharp costophrenic angles. The heart  size and pulmonary vasculature are normal. The mediastinal contour is  normal. There is no pneumothorax.                                          ED Medications   ED Medications     Medications   dexAMETHasone (DECADRON) injection 10 mg (10 mg Intravenous Given 6/15/24 1539)   lidocaine viscous 2 % oral solution 15 mL (15 mLs Swish & Swallow Given 6/15/24 1621)   acetaminophen (TYLENOL) tablet 1,000 mg (1,000 mg Oral Given 6/15/24 1621)   ibuprofen (MOTRIN) tablet 600 mg (600 mg Oral Given 6/15/24 1621)          ED Course     Vitals:    06/15/24 1236 06/15/24 1713   BP: (!) 154/86 (!) 145/88   BP Location: LUE - Left upper extremity RUE - Right upper extremity   Patient Position: Sitting/High-Matthews's Sitting/High-Matthews's   Pulse: (!) 102 (!) 100   Resp: 18 16   Temp: 98.2 °F (36.8 °C)    TempSrc: Oral    SpO2: 98% 99%   Weight: 112.1 kg (247 lb 1.6 oz)    Height: 5' 1\" (1.549 m)    LMP: 11/02/2023            Radiology Review: I have independently interpreted the Chest X-Ray and have found No acute or active disease.  I am awaiting on the final radiology read.          Consults                    Medical Decision Making                            Patient is a 22 year old with complaint of sore throat and cough for the past week.  My differential diagnosis includes but is not limited to COVID/flu/RSV, strep throat, mono, peritonsillar abscess, Danny's angina, among others.  Patient is in no acute distress.  Mildly tachycardic, otherwise vitals are within normal limits.  Patient is afebrile.  Has bilateral unilateral tonsillar swelling.  Uvula is midline.  Decadron ordered to reduce inflammation.  Patient is on hydrocortisone daily as she has known absence of pituitary gland.  Case was discussed with ED attending.  He agrees with workup.    Labs were unremarkable besides hemoglobin of 11.4 and  leukocytosis of 12.2.  Elevated platelets as well, however, baseline.  Do believe the leukocytosis is due to the daily hydrocortisone use.  Low suspicion for bacterial infection at this time given that patient was afebrile and procalcitonin is within normal limits.  Patient tested negative for strep PCR and Monospot was negative as well.  Updated patient and mom on the workup.  Chest x-ray was unremarkable.  Low suspicion for pneumonia or bacterial infection at this time.  Discussed that  Symptoms are most likely due to viral pharyngitis and viral URI.  Discussed plan for ibuprofen and Tylenol at home.  We will give benzonatate for cough.  Also discussed plan to follow up with PCP for further evaluation and management.  Strict return precautions were provided.  They understand and agree with plan.  All questions were answered  The patient will not require admission.         Does the Patient have sepsis: NO     Critical Care       No Critical Care        Disposition       Clinical Impression and Diagnosis  7:42 PM       ED Diagnoses       Diagnosis Comment Associated Orders       Final diagnoses    Viral URI with cough -- --    Viral pharyngitis -- --            Follow Up:  Cora Hernandez MD  [address] [facility] [address]  [phone]    Schedule an appointment as soon as possible for a visit   In the next few days for ER follow up and further evaluation    [facility]  [address]  [place] [id number] [phone]    If symptoms worsen          Summary of your Discharge Medications        Take these Medications        Details   benzonatate 100 MG capsule  Commonly known as: TESSALON PERLES   Take 1 capsule by mouth 3 times daily as needed for Cough.              Pt is discharged to home/self care in stable condition.              Discharge 6/15/2024  5:00 PM  Marlen Fisher discharge to home/self care.                 Leah Suazo PA-C  06/15/24 1943     No

## 2024-08-26 ENCOUNTER — APPOINTMENT (OUTPATIENT)
Dept: CARDIOLOGY | Facility: CLINIC | Age: 79
End: 2024-08-26
Payer: MEDICARE

## 2024-08-26 VITALS
TEMPERATURE: 98.1 F | OXYGEN SATURATION: 98 % | RESPIRATION RATE: 15 BRPM | SYSTOLIC BLOOD PRESSURE: 148 MMHG | HEIGHT: 62 IN | HEART RATE: 83 BPM | BODY MASS INDEX: 34.78 KG/M2 | WEIGHT: 189 LBS | DIASTOLIC BLOOD PRESSURE: 80 MMHG

## 2024-08-26 VITALS — SYSTOLIC BLOOD PRESSURE: 112 MMHG | DIASTOLIC BLOOD PRESSURE: 68 MMHG

## 2024-08-26 DIAGNOSIS — E78.00 PURE HYPERCHOLESTEROLEMIA, UNSPECIFIED: ICD-10-CM

## 2024-08-26 DIAGNOSIS — I10 ESSENTIAL (PRIMARY) HYPERTENSION: ICD-10-CM

## 2024-08-26 DIAGNOSIS — I25.10 ATHEROSCLEROTIC HEART DISEASE OF NATIVE CORONARY ARTERY W/OUT ANGINA PECTORIS: ICD-10-CM

## 2024-08-26 PROCEDURE — 99214 OFFICE O/P EST MOD 30 MIN: CPT

## 2024-08-26 NOTE — PHYSICAL EXAM
[No Carotid Bruit] : no carotid bruit [Normal S1, S2] : normal S1, S2 [No Rub] : no rub [Soft] : abdomen soft [Non Tender] : non-tender [Normal Bowel Sounds] : normal bowel sounds [Normal Gait] : normal gait [Normal] : alert and oriented, normal memory [de-identified] : 1/4 EDM in 2nd ICS.

## 2024-08-26 NOTE — DISCUSSION/SUMMARY
[FreeTextEntry1] : In a summary Lianna Funk is an elderly female with Hypertension, controlled. Continue Amlodipine, Losartan- HCTZ, Atenolol and 2 gm sodium diet. Hypercholesterolemia, continue Pravachol and low cholesterol diet. LDL goal less than 70 g/dL. Diabetes, on medications and low Carb diet. Non obstructive CAD, continue Aspirin. Palpitations, continue Atenolol. Continue healthy lifestyle. Follow up in 4 months.

## 2024-09-04 NOTE — ED ADULT NURSE NOTE - GASTROINTESTINAL WDL
Abdomen soft, nontender, nondistended, bowel sounds present in all 4 quadrants. oral Patient/Caregiver provided printed discharge information.

## 2024-09-09 ENCOUNTER — INPATIENT (INPATIENT)
Facility: HOSPITAL | Age: 79
LOS: 1 days | Discharge: HOME | End: 2024-09-11
Attending: INTERNAL MEDICINE | Admitting: INTERNAL MEDICINE
Payer: MEDICARE

## 2024-09-09 VITALS
HEART RATE: 64 BPM | RESPIRATION RATE: 16 BRPM | DIASTOLIC BLOOD PRESSURE: 78 MMHG | OXYGEN SATURATION: 97 % | SYSTOLIC BLOOD PRESSURE: 174 MMHG | TEMPERATURE: 99 F

## 2024-09-09 DIAGNOSIS — Z29.9 ENCOUNTER FOR PROPHYLACTIC MEASURES, UNSPECIFIED: ICD-10-CM

## 2024-09-09 DIAGNOSIS — Z98.890 OTHER SPECIFIED POSTPROCEDURAL STATES: Chronic | ICD-10-CM

## 2024-09-09 DIAGNOSIS — Z90.710 ACQUIRED ABSENCE OF BOTH CERVIX AND UTERUS: Chronic | ICD-10-CM

## 2024-09-09 DIAGNOSIS — I10 ESSENTIAL (PRIMARY) HYPERTENSION: ICD-10-CM

## 2024-09-09 DIAGNOSIS — Z79.899 OTHER LONG TERM (CURRENT) DRUG THERAPY: ICD-10-CM

## 2024-09-09 DIAGNOSIS — E11.9 TYPE 2 DIABETES MELLITUS WITHOUT COMPLICATIONS: ICD-10-CM

## 2024-09-09 DIAGNOSIS — N18.9 CHRONIC KIDNEY DISEASE, UNSPECIFIED: ICD-10-CM

## 2024-09-09 DIAGNOSIS — R07.9 CHEST PAIN, UNSPECIFIED: ICD-10-CM

## 2024-09-09 DIAGNOSIS — R09.89 OTHER SPECIFIED SYMPTOMS AND SIGNS INVOLVING THE CIRCULATORY AND RESPIRATORY SYSTEMS: ICD-10-CM

## 2024-09-09 DIAGNOSIS — R07.89 OTHER CHEST PAIN: ICD-10-CM

## 2024-09-09 LAB
ALBUMIN SERPL ELPH-MCNC: 3.8 G/DL — SIGNIFICANT CHANGE UP (ref 3.3–5)
ALP SERPL-CCNC: 52 U/L — SIGNIFICANT CHANGE UP (ref 40–120)
ALT FLD-CCNC: 13 U/L — SIGNIFICANT CHANGE UP (ref 4–33)
ANION GAP SERPL CALC-SCNC: 12 MMOL/L — SIGNIFICANT CHANGE UP (ref 7–14)
APTT BLD: 34.2 SEC — SIGNIFICANT CHANGE UP (ref 24.5–35.6)
AST SERPL-CCNC: 15 U/L — SIGNIFICANT CHANGE UP (ref 4–32)
BASOPHILS # BLD AUTO: 0.03 K/UL — SIGNIFICANT CHANGE UP (ref 0–0.2)
BASOPHILS NFR BLD AUTO: 0.4 % — SIGNIFICANT CHANGE UP (ref 0–2)
BILIRUB SERPL-MCNC: <0.2 MG/DL — SIGNIFICANT CHANGE UP (ref 0.2–1.2)
BUN SERPL-MCNC: 11 MG/DL — SIGNIFICANT CHANGE UP (ref 7–23)
CALCIUM SERPL-MCNC: 9.8 MG/DL — SIGNIFICANT CHANGE UP (ref 8.4–10.5)
CHLORIDE SERPL-SCNC: 104 MMOL/L — SIGNIFICANT CHANGE UP (ref 98–107)
CO2 SERPL-SCNC: 26 MMOL/L — SIGNIFICANT CHANGE UP (ref 22–31)
CREAT SERPL-MCNC: 1.35 MG/DL — HIGH (ref 0.5–1.3)
EGFR: 39 ML/MIN/1.73M2 — LOW
EOSINOPHIL # BLD AUTO: 0.1 K/UL — SIGNIFICANT CHANGE UP (ref 0–0.5)
EOSINOPHIL NFR BLD AUTO: 1.3 % — SIGNIFICANT CHANGE UP (ref 0–6)
GLUCOSE BLDC GLUCOMTR-MCNC: 196 MG/DL — HIGH (ref 70–99)
GLUCOSE BLDC GLUCOMTR-MCNC: 208 MG/DL — HIGH (ref 70–99)
GLUCOSE SERPL-MCNC: 147 MG/DL — HIGH (ref 70–99)
HCT VFR BLD CALC: 36.7 % — SIGNIFICANT CHANGE UP (ref 34.5–45)
HGB BLD-MCNC: 12.7 G/DL — SIGNIFICANT CHANGE UP (ref 11.5–15.5)
IANC: 3.49 K/UL — SIGNIFICANT CHANGE UP (ref 1.8–7.4)
IMM GRANULOCYTES NFR BLD AUTO: 0.5 % — SIGNIFICANT CHANGE UP (ref 0–0.9)
INR BLD: 0.96 RATIO — SIGNIFICANT CHANGE UP (ref 0.85–1.18)
LYMPHOCYTES # BLD AUTO: 3.44 K/UL — HIGH (ref 1–3.3)
LYMPHOCYTES # BLD AUTO: 44 % — SIGNIFICANT CHANGE UP (ref 13–44)
MCHC RBC-ENTMCNC: 31.1 PG — SIGNIFICANT CHANGE UP (ref 27–34)
MCHC RBC-ENTMCNC: 34.6 GM/DL — SIGNIFICANT CHANGE UP (ref 32–36)
MCV RBC AUTO: 90 FL — SIGNIFICANT CHANGE UP (ref 80–100)
MONOCYTES # BLD AUTO: 0.72 K/UL — SIGNIFICANT CHANGE UP (ref 0–0.9)
MONOCYTES NFR BLD AUTO: 9.2 % — SIGNIFICANT CHANGE UP (ref 2–14)
NEUTROPHILS # BLD AUTO: 3.49 K/UL — SIGNIFICANT CHANGE UP (ref 1.8–7.4)
NEUTROPHILS NFR BLD AUTO: 44.6 % — SIGNIFICANT CHANGE UP (ref 43–77)
NRBC # BLD: 0 /100 WBCS — SIGNIFICANT CHANGE UP (ref 0–0)
NRBC # FLD: 0 K/UL — SIGNIFICANT CHANGE UP (ref 0–0)
PLATELET # BLD AUTO: 217 K/UL — SIGNIFICANT CHANGE UP (ref 150–400)
POTASSIUM SERPL-MCNC: 4 MMOL/L — SIGNIFICANT CHANGE UP (ref 3.5–5.3)
POTASSIUM SERPL-SCNC: 4 MMOL/L — SIGNIFICANT CHANGE UP (ref 3.5–5.3)
PROT SERPL-MCNC: 6.6 G/DL — SIGNIFICANT CHANGE UP (ref 6–8.3)
PROTHROM AB SERPL-ACNC: 10.8 SEC — SIGNIFICANT CHANGE UP (ref 9.5–13)
RBC # BLD: 4.08 M/UL — SIGNIFICANT CHANGE UP (ref 3.8–5.2)
RBC # FLD: 13.2 % — SIGNIFICANT CHANGE UP (ref 10.3–14.5)
SODIUM SERPL-SCNC: 142 MMOL/L — SIGNIFICANT CHANGE UP (ref 135–145)
TROPONIN T, HIGH SENSITIVITY RESULT: 13 NG/L — SIGNIFICANT CHANGE UP
WBC # BLD: 7.82 K/UL — SIGNIFICANT CHANGE UP (ref 3.8–10.5)
WBC # FLD AUTO: 7.82 K/UL — SIGNIFICANT CHANGE UP (ref 3.8–10.5)

## 2024-09-09 PROCEDURE — 99285 EMERGENCY DEPT VISIT HI MDM: CPT

## 2024-09-09 PROCEDURE — 99223 1ST HOSP IP/OBS HIGH 75: CPT

## 2024-09-09 PROCEDURE — 71045 X-RAY EXAM CHEST 1 VIEW: CPT | Mod: 26

## 2024-09-09 RX ORDER — ASPIRIN 81 MG
81 TABLET, DELAYED RELEASE (ENTERIC COATED) ORAL DAILY
Refills: 0 | Status: DISCONTINUED | OUTPATIENT
Start: 2024-09-09 | End: 2024-09-11

## 2024-09-09 RX ORDER — GLUCAGON INJECTION, SOLUTION 1 MG/.2ML
1 INJECTION, SOLUTION SUBCUTANEOUS ONCE
Refills: 0 | Status: DISCONTINUED | OUTPATIENT
Start: 2024-09-09 | End: 2024-09-11

## 2024-09-09 RX ORDER — DEXTROSE 15 G/33 G
25 GEL IN PACKET (GRAM) ORAL ONCE
Refills: 0 | Status: DISCONTINUED | OUTPATIENT
Start: 2024-09-09 | End: 2024-09-11

## 2024-09-09 RX ORDER — RALOXIFENE HYDROCHLORIDE 60 MG/1
60 TABLET ORAL DAILY
Refills: 0 | Status: DISCONTINUED | OUTPATIENT
Start: 2024-09-09 | End: 2024-09-11

## 2024-09-09 RX ORDER — CYANOCOBALAMIN (VITAMIN B-12) 500MCG/0.1
2000 GEL (ML) NASAL DAILY
Refills: 0 | Status: DISCONTINUED | OUTPATIENT
Start: 2024-09-09 | End: 2024-09-11

## 2024-09-09 RX ORDER — LOSARTAN POTASSIUM 50 MG/1
100 TABLET ORAL DAILY
Refills: 0 | Status: DISCONTINUED | OUTPATIENT
Start: 2024-09-09 | End: 2024-09-11

## 2024-09-09 RX ORDER — ENOXAPARIN SODIUM 100 MG/ML
40 INJECTION SUBCUTANEOUS EVERY 24 HOURS
Refills: 0 | Status: DISCONTINUED | OUTPATIENT
Start: 2024-09-09 | End: 2024-09-11

## 2024-09-09 RX ORDER — FOLIC ACID/MULTIVIT,IRON,MINER 0.4MG-18MG
2000 TABLET,CHEWABLE ORAL DAILY
Refills: 0 | Status: DISCONTINUED | OUTPATIENT
Start: 2024-09-09 | End: 2024-09-11

## 2024-09-09 RX ORDER — DEXTROSE 15 G/33 G
12.5 GEL IN PACKET (GRAM) ORAL ONCE
Refills: 0 | Status: DISCONTINUED | OUTPATIENT
Start: 2024-09-09 | End: 2024-09-11

## 2024-09-09 RX ORDER — DEXTROSE 15 G/33 G
15 GEL IN PACKET (GRAM) ORAL ONCE
Refills: 0 | Status: DISCONTINUED | OUTPATIENT
Start: 2024-09-09 | End: 2024-09-11

## 2024-09-09 RX ORDER — INSULIN GLARGINE 100 [IU]/ML
15 INJECTION, SOLUTION SUBCUTANEOUS AT BEDTIME
Refills: 0 | Status: DISCONTINUED | OUTPATIENT
Start: 2024-09-09 | End: 2024-09-11

## 2024-09-09 RX ORDER — ATENOLOL 100 MG
50 TABLET ORAL EVERY 12 HOURS
Refills: 0 | Status: DISCONTINUED | OUTPATIENT
Start: 2024-09-09 | End: 2024-09-11

## 2024-09-09 RX ORDER — AMLODIPINE BESYLATE 10 MG/1
5 TABLET ORAL DAILY
Refills: 0 | Status: DISCONTINUED | OUTPATIENT
Start: 2024-09-09 | End: 2024-09-10

## 2024-09-09 RX ADMIN — Medication 10 MILLIGRAM(S): at 23:25

## 2024-09-09 RX ADMIN — INSULIN GLARGINE 15 UNIT(S): 100 INJECTION, SOLUTION SUBCUTANEOUS at 23:25

## 2024-09-09 NOTE — ED ADULT NURSE REASSESSMENT NOTE - NS ED NURSE REASSESS COMMENT FT1
report given to tele RN, all questions answered. pt able to ambulate to wheelchair with steady gait. pt transported to unit via wheelchair in NAD, safety measures maintained, all belongings with pt

## 2024-09-09 NOTE — ED ADULT NURSE NOTE - NSFALLUNIVINTERV_ED_ALL_ED
Bed/Stretcher in lowest position, wheels locked, appropriate side rails in place/Call bell, personal items and telephone in reach/Instruct patient to call for assistance before getting out of bed/chair/stretcher/Non-slip footwear applied when patient is off stretcher/Jeremiah to call system/Physically safe environment - no spills, clutter or unnecessary equipment/Purposeful proactive rounding/Room/bathroom lighting operational, light cord in reach

## 2024-09-09 NOTE — H&P ADULT - PROBLEM SELECTOR PLAN 4
on home short acting 15 U bid before breakfast and dinner as well as 20 long acting qhs; will c/w 20% reduction

## 2024-09-09 NOTE — H&P ADULT - PROBLEM SELECTOR PLAN 3
-intermittent chest heaviness over last few days. Denies h/o cad; negative stress test noted in Oct 2018, denies undergoing stress test since Trop 13, ekg non-ischemic. On Evista for osteoporosis.  Denies calf tenderness, leg swelling  -trend trops to peak  -would obtain cards eval  -on Evista, but low modified wells

## 2024-09-09 NOTE — PROVIDER CONTACT NOTE (HYPOGLYCEMIA EVENT) - NS PROVIDER CONTACT BACKGROUND-HYPO
Age: 79y    Gender: Female    POCT Blood Glucose:  127 mg/dL (09-09-24 @ 16:13)  69 mg/dL (09-09-24 @ 15:39)      eMAR:

## 2024-09-09 NOTE — H&P ADULT - HISTORY OF PRESENT ILLNESS
79-year-old female history of HLD, HTN, DM, OA on Evista, presenting for hypertension.  Reports labile htn for last few days marked by BP in 170s at home associated with frontal HA. BP she records at home typically smp 120s. Pt does note similar  type of HA in past but not associated with elevated BP. Denies change to BP med regimen  save for increase of atenolol from 50 daily to bid since December (at times has to skip a dose of atenolol 2/2 symptomatic hypotension). Denies fever, rash.   Also notes intermittent chest heaviness over last few days. Denies h/o cad; negative stress test noted in Oct 2018, denies undergoing stress test since Trop 13, ekg non-ischemic. On Evista for osteoporosis.  Denies calf tenderness, leg swelling  79-year-old female history of HLD, HTN, DM, OA on Evista, presenting for hypertension.  Reports labile htn for last few days marked by BP in 170s at home associated with frontal HA. BP she records at home typically smp 120s. Pt does note similar  type of HA in past but not associated with elevated BP. Denies change to BP med regimen  save for increase of atenolol from 50 daily to bid since December (at times has to skip a dose of atenolol 2/2 symptomatic hypotension). Denies fever, rash. Denies curran in vison, no cranial nerve deficits grossly apparent   Also notes intermittent chest heaviness over last few days. Denies h/o cad; negative stress test noted in Oct 2018, denies undergoing stress test since Trop 13, ekg non-ischemic. On Evista for osteoporosis.  Denies calf tenderness, leg swelling  79-year-old female history of HLD, HTN, DM, OA on Evista, presenting for hypertension.  Reports labile htn for last few days marked by BP in 170s at home associated with frontal HA. BP she records at home typically smp 120s. Pt does note similar  type of HA in past but not associated with elevated BP. Denies change to BP med regimen  save for increase of atenolol from 50 daily to bid since December (at times has to skip a dose of atenolol 2/2 symptomatic hypotension). Denies fever, rash. Denies change in vision, no cranial nerve deficits grossly apparent   Also notes intermittent chest heaviness over last few days. Denies h/o cad; negative stress test noted in Oct 2018, denies undergoing stress test since Trop 13, ekg non-ischemic. On Evista for osteoporosis.  Denies calf tenderness, leg swelling

## 2024-09-09 NOTE — ED ADULT NURSE NOTE - OBJECTIVE STATEMENT
Pt came in complaining of hypertension for 2 days. Pt states she takes her blood pressure at home and for 2 days it has been high and she is complaint with her medication. Pt endorses she has been having headaches on and off for a week with chest pressure that will radiate to her L arm. Hx of HTN, DM. Denies any SOB, N/V/D, dizziness, recent trauma, fevers, chills, recent sick contact.     Pt ambulatory, A&Ox3, neurologically intact, equal strength in all extremities, respirations even and unlabored, equal chest rise with clear lung sounds, s1 and s2 sounds heard on auscultation with NSR and HR on monitor, no abdominal distention, + bowel sounds, non-tender, pulses in all extremities, skin intact, plan of care educated with pt, safety maintained, bed in lowest position, call bell within reach

## 2024-09-09 NOTE — ED PROVIDER NOTE - PROGRESS NOTE DETAILS
[employer]ending [employer].  Pt signed out to me in stable condition pending admission for ACS, 80 yo fem with hx of HTN, sent from MD for HTN, CP, HA's, not hypertensive in ED, HA not descirbed as severe, pressure-like nonpleuritic non-rad.  Shortly after signout pt accepted for admission by Dr. Dill.

## 2024-09-09 NOTE — ED ADULT TRIAGE NOTE - PAIN RATING/NUMBER SCALE (0-10): ACTIVITY
Ambulated to room  5 independently.  Migraine headache for days.   Ran out of Imitrex.  Just wants Imitrex injection.  Rates pain at 8 Excedrin at 1300.  Some nausea, no vomiting.     7 (severe pain)

## 2024-09-09 NOTE — ED ADULT TRIAGE NOTE - CODE STROKE ACTIVE YN
Chief Complaint   Patient presents with   • Surgical Followup     DOS 4/23 8 week fuv        Subjective:  Hemant Ochoa is a 63 
No

## 2024-09-09 NOTE — CONSULT NOTE ADULT - ASSESSMENT
80 yo F with PMH of HTN, OA, Hypercholesteremia, CKD, presents to the ED with HTN and headache. Nephrology consulted for CKD.     A/P   CKD 3b  follows up with Dr. Miller   baseline scr 1.1-1.3  admitted with scr 1.35  scr at baseline  avoid nephrotoxins if possible   monitor UO and bmp     HTN   bp fluctuating  resume home meds   low sodium diet  monitor bp

## 2024-09-09 NOTE — ED PROVIDER NOTE - CLINICAL SUMMARY MEDICAL DECISION MAKING FREE TEXT BOX
This is a 79-year-old female history of HLD, HTN, DM, OA, presenting for hypertension.  The patient reports that for the past couple her days the blood pressure has been in the 170 systolic.  The patient went to go see her doctor today and was told to come to the ED for further evaluation.  The patient reports that she is also been having headaches that come and go and also pain/pressing sensation in her chest.  The patient reports that this also comes and goes.  The patient has not taken anything for her symptoms.  The patient reports that she takes her blood pressure medication every day and does not miss a dose.  The patient otherwise denies nausea, vomiting, vision changes, fevers, chills, urinary symptoms.  VSS.  PE.  No focal neurological deficits.  Unremarkable physical exam.  Differential includes but is not limited to ACS, electrolyte/hematological derangements, pneumonia, low concern for dissection at this time.  Will obtain basic labs, cardiac labs, EKG, chest x-ray.  Final disposition pending labs imaging and reassessment. Satish att: This is a 79-year-old female history of HLD, HTN, DM, OA, presenting for hypertension.  The patient reports that for the past couple her days the blood pressure has been in the 170 systolic.  The patient went to go see her doctor today and was told to come to the ED for further evaluation.  The patient reports that she is also been having headaches that come and go and also pain/pressing sensation in her chest.  The patient reports that this also comes and goes.  The patient has not taken anything for her symptoms.  The patient reports that she takes her blood pressure medication every day and does not miss a dose.  The patient otherwise denies nausea, vomiting, vision changes, fevers, chills, urinary symptoms.  VSS.  PE.  No focal neurological deficits.  Unremarkable physical exam.  Differential includes but is not limited to ACS, electrolyte/hematological derangements, pneumonia, low concern for dissection at this time.  Will obtain basic labs, cardiac labs, EKG, chest x-ray.  Final disposition pending labs imaging and reassessment.

## 2024-09-09 NOTE — H&P ADULT - PROBLEM SELECTOR PLAN 1
-associated with frontal HA. Unclear if HA precipitating elevated BP or vice versa. As pt has no recent change to BP regimen, unclear cause for new onset lability. Would consider  evaluating for endocrinopathy, but would fist monitor pt here and follow vitals while on her usual BP med regimen   -fall precautions   -tte, orthostatics

## 2024-09-09 NOTE — H&P ADULT - NSHPREVIEWOFSYSTEMS_GEN_ALL_CORE
Review of Systems:   CONSTITUTIONAL: No fever, weight loss, or fatigue  EYES: No eye pain, visual disturbances, or discharge  ENMT:  No difficulty hearing, tinnitus, vertigo; No sinus or throat pain  NECK: No pain or stiffness  RESPIRATORY: No cough, wheezing, chills or hemoptysis; No shortness of breath  CARDIOVASCULAR: mild chest heaviness  GASTROINTESTINAL: No abdominal or epigastric pain. No nausea, vomiting, or hematemesis; No diarrhea or constipation. No melena or hematochezia.  GENITOURINARY: No dysuria, frequency, hematuria, or incontinence  NEUROLOGICAL: No current  headaches  SKIN: No itching, burning, rashes, or lesions   MUSCULOSKELETAL: chronic left  foot pain; being evaluated  by outpt podiatry

## 2024-09-09 NOTE — H&P ADULT - NSHPLABSRESULTS_GEN_ALL_CORE
12.7   7.82  )-----------( 217      ( 09 Sep 2024 16:27 )             36.7     09-09    142  |  104  |  11  ----------------------------<  147<H>  4.0   |  26  |  1.35<H>    Ca    9.8      09 Sep 2024 16:27    TPro  6.6  /  Alb  3.8  /  TBili  <0.2  /  DBili  x   /  AST  15  /  ALT  13  /  AlkPhos  52  09-09    CAPILLARY BLOOD GLUCOSE      POCT Blood Glucose.: 208 mg/dL (09 Sep 2024 22:30)  POCT Blood Glucose.: 196 mg/dL (09 Sep 2024 19:31)  POCT Blood Glucose.: 127 mg/dL (09 Sep 2024 16:13)  POCT Blood Glucose.: 69 mg/dL (09 Sep 2024 15:39)    PT/INR - ( 09 Sep 2024 16:27 )   PT: 10.8 sec;   INR: 0.96 ratio         PTT - ( 09 Sep 2024 16:27 )  PTT:34.2 sec  Urinalysis Basic - ( 09 Sep 2024 16:27 )    Color: x / Appearance: x / SG: x / pH: x  Gluc: 147 mg/dL / Ketone: x  / Bili: x / Urobili: x   Blood: x / Protein: x / Nitrite: x   Leuk Esterase: x / RBC: x / WBC x   Sq Epi: x / Non Sq Epi: x / Bacteria: x      Vital Signs Last 24 Hrs  T(C): 36.9 (09 Sep 2024 22:50), Max: 37.1 (09 Sep 2024 12:18)  T(F): 98.5 (09 Sep 2024 22:50), Max: 98.7 (09 Sep 2024 12:18)  HR: 63 (09 Sep 2024 22:50) (63 - 80)  BP: 120/61 (09 Sep 2024 22:50) (120/61 - 174/78)  BP(mean): 88 (09 Sep 2024 16:10) (88 - 88)  RR: 17 (09 Sep 2024 22:50) (16 - 19)  SpO2: 95% (09 Sep 2024 22:50) (95% - 100%)    Parameters below as of 09 Sep 2024 19:34  Patient On (Oxygen Delivery Method): room air

## 2024-09-09 NOTE — H&P ADULT - PROBLEM/PLAN-2
Yuli Dejesus M.D.  [phone]            2018          Colonoscopy Operative Report  Camille Diaz  :  1958  [facility] Medical Record Number:  [id number]      Indications:    Screening colonoscopy     :  Rianna Floyd MD    Referring Provider: Sriram Cooper MD    Sedation:  MAC anesthesia    Pre-Procedural Exam:      Airway: clear,  No airway problems anticipated  Heart: RRR, without gallops or rubs  Lungs: clear bilaterally without wheezes, crackles, or rhonchi  Abdomen: soft, nontender, nondistended, bowel sounds present  Mental Status: awake, alert and oriented to person, place and time     Procedure Details:  After informed consent was obtained with all risks and benefits of procedure explained and preoperative exam completed, the patient was taken to the endoscopy suite and placed in the left lateral decubitus position. Upon sequential sedation as per above, a digital rectal exam was performed. The Olympus videocolonoscope  was inserted in the rectum and carefully advanced to the cecum, which was identified by the ileocecal valve and appendiceal orifice. The quality of preparation was good. The colonoscope was slowly withdrawn with careful inspection and evaluation between folds. Retroflexion in the rectum was performed. Findings:   Terminal Ileum: not intubated  Cecum: normal  Ascending Colon: normal  Transverse Colon: normal  Descending Colon: normal  Sigmoid: no mucosal lesion appreciated  mild diverticulosis; Rectum: 1  Sessile polyp(s), the largest 3 mm in size  Grade 1 internal hemorrhoid(s); Interventions:  1 complete polypectomy were performed using cold snare  and the polyps were  retrieved    Specimen Removed:  specimen #1, 3 mm in size, located in the rectum removed by cold snare and retrieved for pathology    Complications: None. EBL:  None.     Impression:  One diminutive polyp removed and sent to pathology, otherwise mucosa within normal.                         Mild sigmoid diverticulosis and small internal hemorrhoids    Recommendations:  -If adenoma is present, repeat colonoscopy in 5 years.   -High fiber diet.    -Resume normal medication(s). Discharge Disposition:  Home in the company of a  when able to ambulate.     Juancarlos Diaz MD  6/8/2018  10:14 AM DISPLAY PLAN FREE TEXT

## 2024-09-09 NOTE — ED ADULT NURSE NOTE - CHIEF COMPLAINT QUOTE
Pt presents to ED via EMS from [facility] office with c/o hypertension and headache. Pt has hx of HTN and has been compliant with medications. Pt denies numbness, tingling, dizziness, or weakness.

## 2024-09-09 NOTE — ED ADULT NURSE REASSESSMENT NOTE - NS ED NURSE REASSESS COMMENT FT1
report received from previous ED RN. pt resting in stretcher in NAD, A&OX4, RR even and unlabored. pt able to speak in clear complete sentences, CASTELLANOS equal bilaterally. denies chest pain or fluttering at this time. maintained on cardiac monitor, NSR noted on monitor. safety measures maintained, side rails up x2. report given to tele RN, all questions answered. awaiting transportation to unit. hospitalist at bedside, pt able to answer questions appropriately

## 2024-09-10 LAB
A1C WITH ESTIMATED AVERAGE GLUCOSE RESULT: 7.6 % — HIGH (ref 4–5.6)
ALBUMIN SERPL ELPH-MCNC: 3.6 G/DL — SIGNIFICANT CHANGE UP (ref 3.3–5)
ALP SERPL-CCNC: 56 U/L — SIGNIFICANT CHANGE UP (ref 40–120)
ALT FLD-CCNC: 12 U/L — SIGNIFICANT CHANGE UP (ref 4–33)
ANION GAP SERPL CALC-SCNC: 12 MMOL/L — SIGNIFICANT CHANGE UP (ref 7–14)
AST SERPL-CCNC: 13 U/L — SIGNIFICANT CHANGE UP (ref 4–32)
BASOPHILS # BLD AUTO: 0.03 K/UL — SIGNIFICANT CHANGE UP (ref 0–0.2)
BASOPHILS NFR BLD AUTO: 0.4 % — SIGNIFICANT CHANGE UP (ref 0–2)
BILIRUB SERPL-MCNC: 0.4 MG/DL — SIGNIFICANT CHANGE UP (ref 0.2–1.2)
BUN SERPL-MCNC: 16 MG/DL — SIGNIFICANT CHANGE UP (ref 7–23)
CALCIUM SERPL-MCNC: 9.9 MG/DL — SIGNIFICANT CHANGE UP (ref 8.4–10.5)
CHLORIDE SERPL-SCNC: 105 MMOL/L — SIGNIFICANT CHANGE UP (ref 98–107)
CO2 SERPL-SCNC: 25 MMOL/L — SIGNIFICANT CHANGE UP (ref 22–31)
CREAT SERPL-MCNC: 1.4 MG/DL — HIGH (ref 0.5–1.3)
EGFR: 38 ML/MIN/1.73M2 — LOW
EOSINOPHIL # BLD AUTO: 0.11 K/UL — SIGNIFICANT CHANGE UP (ref 0–0.5)
EOSINOPHIL NFR BLD AUTO: 1.4 % — SIGNIFICANT CHANGE UP (ref 0–6)
ESTIMATED AVERAGE GLUCOSE: 171 — SIGNIFICANT CHANGE UP
GLUCOSE BLDC GLUCOMTR-MCNC: 151 MG/DL — HIGH (ref 70–99)
GLUCOSE BLDC GLUCOMTR-MCNC: 179 MG/DL — HIGH (ref 70–99)
GLUCOSE BLDC GLUCOMTR-MCNC: 194 MG/DL — HIGH (ref 70–99)
GLUCOSE BLDC GLUCOMTR-MCNC: 221 MG/DL — HIGH (ref 70–99)
GLUCOSE BLDC GLUCOMTR-MCNC: 234 MG/DL — HIGH (ref 70–99)
GLUCOSE SERPL-MCNC: 191 MG/DL — HIGH (ref 70–99)
HCT VFR BLD CALC: 39.6 % — SIGNIFICANT CHANGE UP (ref 34.5–45)
HGB BLD-MCNC: 13.5 G/DL — SIGNIFICANT CHANGE UP (ref 11.5–15.5)
IANC: 3.95 K/UL — SIGNIFICANT CHANGE UP (ref 1.8–7.4)
IMM GRANULOCYTES NFR BLD AUTO: 0.6 % — SIGNIFICANT CHANGE UP (ref 0–0.9)
LYMPHOCYTES # BLD AUTO: 3.16 K/UL — SIGNIFICANT CHANGE UP (ref 1–3.3)
LYMPHOCYTES # BLD AUTO: 39.6 % — SIGNIFICANT CHANGE UP (ref 13–44)
MCHC RBC-ENTMCNC: 31.1 PG — SIGNIFICANT CHANGE UP (ref 27–34)
MCHC RBC-ENTMCNC: 34.1 GM/DL — SIGNIFICANT CHANGE UP (ref 32–36)
MCV RBC AUTO: 91.2 FL — SIGNIFICANT CHANGE UP (ref 80–100)
MONOCYTES # BLD AUTO: 0.67 K/UL — SIGNIFICANT CHANGE UP (ref 0–0.9)
MONOCYTES NFR BLD AUTO: 8.4 % — SIGNIFICANT CHANGE UP (ref 2–14)
MRSA PCR RESULT.: SIGNIFICANT CHANGE UP
NEUTROPHILS # BLD AUTO: 3.95 K/UL — SIGNIFICANT CHANGE UP (ref 1.8–7.4)
NEUTROPHILS NFR BLD AUTO: 49.6 % — SIGNIFICANT CHANGE UP (ref 43–77)
NRBC # BLD: 0 /100 WBCS — SIGNIFICANT CHANGE UP (ref 0–0)
NRBC # FLD: 0 K/UL — SIGNIFICANT CHANGE UP (ref 0–0)
PLATELET # BLD AUTO: 226 K/UL — SIGNIFICANT CHANGE UP (ref 150–400)
POTASSIUM SERPL-MCNC: 3.8 MMOL/L — SIGNIFICANT CHANGE UP (ref 3.5–5.3)
POTASSIUM SERPL-SCNC: 3.8 MMOL/L — SIGNIFICANT CHANGE UP (ref 3.5–5.3)
PROT SERPL-MCNC: 6.6 G/DL — SIGNIFICANT CHANGE UP (ref 6–8.3)
RBC # BLD: 4.34 M/UL — SIGNIFICANT CHANGE UP (ref 3.8–5.2)
RBC # FLD: 13 % — SIGNIFICANT CHANGE UP (ref 10.3–14.5)
S AUREUS DNA NOSE QL NAA+PROBE: SIGNIFICANT CHANGE UP
SODIUM SERPL-SCNC: 142 MMOL/L — SIGNIFICANT CHANGE UP (ref 135–145)
TROPONIN T, HIGH SENSITIVITY RESULT: 14 NG/L — SIGNIFICANT CHANGE UP
TROPONIN T, HIGH SENSITIVITY RESULT: 14 NG/L — SIGNIFICANT CHANGE UP
WBC # BLD: 7.97 K/UL — SIGNIFICANT CHANGE UP (ref 3.8–10.5)
WBC # FLD AUTO: 7.97 K/UL — SIGNIFICANT CHANGE UP (ref 3.8–10.5)

## 2024-09-10 RX ORDER — AMLODIPINE BESYLATE 10 MG/1
10 TABLET ORAL DAILY
Refills: 0 | Status: DISCONTINUED | OUTPATIENT
Start: 2024-09-10 | End: 2024-09-11

## 2024-09-10 RX ORDER — AMLODIPINE BESYLATE 10 MG/1
10 TABLET ORAL DAILY
Refills: 0 | Status: DISCONTINUED | OUTPATIENT
Start: 2024-09-10 | End: 2024-09-10

## 2024-09-10 RX ORDER — LOSARTAN POTASSIUM AND HYDROCHLOROTHIAZIDE 100; 25 MG/1; MG/1
1 TABLET, FILM COATED ORAL
Refills: 0 | DISCHARGE

## 2024-09-10 RX ORDER — ASPIRIN 81 MG
1 TABLET, DELAYED RELEASE (ENTERIC COATED) ORAL
Refills: 0 | DISCHARGE

## 2024-09-10 RX ORDER — POVIDONE, PROPYLENE GLYCOL 6.8; 3 MG/ML; MG/ML
1 LIQUID OPHTHALMIC
Refills: 0 | DISCHARGE

## 2024-09-10 RX ORDER — INSULIN GLARGINE 100 [IU]/ML
20 INJECTION, SOLUTION SUBCUTANEOUS
Refills: 0 | DISCHARGE

## 2024-09-10 RX ORDER — GABAPENTIN 100 MG
1 CAPSULE ORAL
Refills: 0 | DISCHARGE

## 2024-09-10 RX ORDER — POVIDONE, PROPYLENE GLYCOL 6.8; 3 MG/ML; MG/ML
1 LIQUID OPHTHALMIC THREE TIMES A DAY
Refills: 0 | Status: DISCONTINUED | OUTPATIENT
Start: 2024-09-10 | End: 2024-09-11

## 2024-09-10 RX ADMIN — Medication 10 MILLIGRAM(S): at 22:06

## 2024-09-10 RX ADMIN — Medication 50 MILLIGRAM(S): at 18:58

## 2024-09-10 RX ADMIN — Medication 50 MILLIGRAM(S): at 05:12

## 2024-09-10 RX ADMIN — Medication 2000 UNIT(S): at 13:04

## 2024-09-10 RX ADMIN — RALOXIFENE HYDROCHLORIDE 60 MILLIGRAM(S): 60 TABLET ORAL at 18:58

## 2024-09-10 RX ADMIN — Medication 81 MILLIGRAM(S): at 13:04

## 2024-09-10 RX ADMIN — Medication 12 UNIT(S): at 17:41

## 2024-09-10 RX ADMIN — LOSARTAN POTASSIUM 100 MILLIGRAM(S): 50 TABLET ORAL at 05:12

## 2024-09-10 RX ADMIN — Medication 2000 MICROGRAM(S): at 13:04

## 2024-09-10 RX ADMIN — AMLODIPINE BESYLATE 5 MILLIGRAM(S): 10 TABLET ORAL at 05:13

## 2024-09-10 RX ADMIN — INSULIN GLARGINE 15 UNIT(S): 100 INJECTION, SOLUTION SUBCUTANEOUS at 22:06

## 2024-09-10 RX ADMIN — Medication 12 UNIT(S): at 09:04

## 2024-09-10 NOTE — PATIENT PROFILE ADULT - NSPROGENOTHERPROVIDER_GEN_A_NUR
[FreeTextEntry1] : 34 year old male, never smoker, no significant past medical history with inability to take deep breath and chest tightness after recent episode of URI (6 weeks back).\par \par Oxygen saturation 98% on RA with ambulation\par \par A/P\par Clinically, his URI symptoms have almost resolved except mild residual dry cough. Lung examination was normal with normal saturation with exertion on RA. He told me that he was very near to her grandmother, who had pleural effusion and breast cancer. He was very anxious during evaluation and he was taking deep breaths during conversations regarding his health and his grandmother's health issues from past. His breathing remained fine while talking about his current job. Clinically, his history and evaluation is suggestive of anxiety/stress induced shortness of breath as etiology for his symptoms. although, possibility of post viral bronchial hyper responsiveness could be a possibility.\par Plan:\par - PFT with DLCO\par - Breathing exercise during episode of SOB was taught to patient\par - If he continues to have symptoms then plan to send to ENT to rule out paradoxical vocal cord\par - Decision about as needed rescue inhaler only after PFT and further evaluation. 
[id number] Comprehensive
none

## 2024-09-10 NOTE — PATIENT PROFILE ADULT - NSTRANSFERDENTURES_GEN_A_NUR
Pt indicates that the upper dentures are in the front and the lower dentures are the sides only. Partial for both top and bottom./partial/upper/lower

## 2024-09-10 NOTE — CONSULT NOTE ADULT - SUBJECTIVE AND OBJECTIVE BOX
[facility]   MD DARCY CONNELLY MD MARIA SANTIAGO, NP        TEL:  OFFICE: [phone]  From 5pm-7am answering service [phone]    --- INITIAL RENAL CONSULT NOTE ---date of service 09-09-24 @ 17:42    HPI:  78 yo F with PMH of HTN, OA, Hypercholesteremia, CKD, presents to the ED with HTN and headache. Nephrology consulted for CKD.       Allergies:  No Known Allergies      PAST MEDICAL & SURGICAL HISTORY:  Hypercholesteremia      Hypertension      DM (Diabetes Mellitus)      Generalized Osteoarthritis      Rotator cuff tear, non-traumatic, left      S/P Cholecystectomy      H/O total hysterectomy      History of arthroplasty of left shoulder          Home Medications Reviewed    Hospital Medications:   MEDICATIONS  (STANDING):      SOCIAL HISTORY:  Denies ETOh, Smoking,     FAMILY HISTORY:  History of hypertension    DM (diabetes mellitus)        REVIEW OF SYSTEMS:  CONSTITUTIONAL: No weakness, fevers or chills  EYES/ENT: No visual changes;  No vertigo or throat pain   NECK: No pain or stiffness  RESPIRATORY: No cough, wheezing, hemoptysis; No shortness of breath  CARDIOVASCULAR: No chest pain or palpitations.  GASTROINTESTINAL: No abdominal or epigastric pain. No nausea, vomiting, or hematemesis; No diarrhea or constipation. No melena or hematochezia.  GENITOURINARY: No dysuria, frequency, foamy urine, urinary urgency, incontinence or hematuria  NEUROLOGICAL: as per HPI   SKIN: No itching, burning, rashes, or lesions   VASCULAR: has edema  All other review of systems is negative unless indicated above.    VITALS:  T(F): 98.5 (09-09-24 @ 16:10), Max: 98.7 (09-09-24 @ 12:18)  HR: 66 (09-09-24 @ 16:10)  BP: 132/68 (09-09-24 @ 16:10)  RR: 18 (09-09-24 @ 16:10)  SpO2: 100% (09-09-24 @ 16:10)  Wt(kg): --        PHYSICAL EXAM:  General: NAD  HEENT: anicteric sclera, oropharynx clear, MMM  Neck: No JVD  Respiratory: CTAB, no wheezes, rales or rhonchi  Cardiovascular: S1, S2, RRR  Gastrointestinal: BS+, soft, NT/ND  Extremities: b/l LE +edema  Neurological: A/O x 3, no focal deficits  Psychiatric: Normal mood, normal affect  : No CVA tenderness. No brownlee.   Skin: No rashes      LABS:  09-09    142  |  104  |  11  ----------------------------<  147<H>  4.0   |  26  |  1.35<H>    Ca    9.8      09 Sep 2024 16:27    TPro  6.6  /  Alb  3.8  /  TBili  <0.2  /  DBili      /  AST  15  /  ALT  13  /  AlkPhos  52  09-09    Creatinine Trend: 1.35 <--                        12.7   7.82  )-----------( 217      ( 09 Sep 2024 16:27 )             36.7     Urine Studies:  Urinalysis Basic - ( 09 Sep 2024 16:27 )    Color:  / Appearance:  / SG:  / pH:   Gluc: 147 mg/dL / Ketone:   / Bili:  / Urobili:    Blood:  / Protein:  / Nitrite:    Leuk Esterase:  / RBC:  / WBC    Sq Epi:  / Non Sq Epi:  / Bacteria:           RADIOLOGY & ADDITIONAL STUDIES:                
Michael Sepulveda MD  Interventional Cardiology / Advance Heart Failure and Cardiac Transplant Specialist  [place] Office : [phone] [place] [address] [place] [address]  Tel:   [place] Office : [phone] [facility] [place] [address]  Tel: [phone]  Cell : [phone]    HISTORY OF PRESENTING ILLNESS:     79-year-old female history of HLD, HTN, DM, OA on Evista, presenting for hypertension.  Reports labile htn for last few days marked by BP in 170s at home associated with frontal HA. BP she records at home typically smp 120s. Pt does note similar  type of HA in past but not associated with elevated BP. Denies change to BP med regimen  save for increase of atenolol from 50 daily to bid since December (at times has to skip a dose of atenolol 2/2 symptomatic hypotension). Denies fever, rash. Denies change in vision, no cranial nerve deficits grossly apparent   Also notes intermittent chest heaviness over last few days. Denies h/o cad; negative stress test noted in Oct 2018, denies undergoing stress test since Trop 13, ekg non-ischemic. On Evista for osteoporosis.  Denies calf tenderness, leg swelling    	  MEDICATIONS:  amLODIPine   Tablet 10 milliGRAM(s) Oral daily  aspirin enteric coated 81 milliGRAM(s) Oral daily  atenolol  Tablet 50 milliGRAM(s) Oral every 12 hours  enoxaparin Injectable 40 milliGRAM(s) SubCutaneous every 24 hours  hydrochlorothiazide 25 milliGRAM(s) Oral daily  losartan 100 milliGRAM(s) Oral daily            atorvastatin 10 milliGRAM(s) Oral at bedtime  dextrose 50% Injectable 25 Gram(s) IV Push once  dextrose 50% Injectable 25 Gram(s) IV Push once  dextrose 50% Injectable 12.5 Gram(s) IV Push once  dextrose Oral Gel 15 Gram(s) Oral once PRN  glucagon  Injectable 1 milliGRAM(s) IntraMuscular once  insulin glargine Injectable (LANTUS) 15 Unit(s) SubCutaneous at bedtime  insulin lispro Injectable (ADMELOG) 12 Unit(s) SubCutaneous before dinner  insulin lispro Injectable (ADMELOG) 12 Unit(s) SubCutaneous before breakfast    artificial tears (preservative free) Ophthalmic Solution 1 Drop(s) Both EYES three times a day PRN  cholecalciferol 2000 Unit(s) Oral daily  cyanocobalamin 2000 MICROGram(s) Oral daily  dextrose 5%. 1000 milliLiter(s) IV Continuous <Continuous>  dextrose 5%. 1000 milliLiter(s) IV Continuous <Continuous>  raloxifene 60 milliGRAM(s) Oral daily      PAST MEDICAL/SURGICAL HISTORY  PAST MEDICAL & SURGICAL HISTORY:  Hypercholesteremia      Hypertension      DM (Diabetes Mellitus)      Generalized Osteoarthritis      Rotator cuff tear, non-traumatic, left      S/P Cholecystectomy      H/O total hysterectomy      History of arthroplasty of left shoulder          SOCIAL HISTORY: Substance Use (street drugs): ( x ) never used  (  ) other:    FAMILY HISTORY:  History of hypertension    DM (diabetes mellitus)        REVIEW OF SYSTEMS:  CONSTITUTIONAL: No fever, weight loss, or fatigue  EYES: No eye pain, visual disturbances, or discharge  ENMT:  No difficulty hearing, tinnitus, vertigo; No sinus or throat pain  BREASTS: No pain, masses, or nipple discharge  GASTROINTESTINAL: No abdominal or epigastric pain. No nausea, vomiting, or hematemesis; No diarrhea or constipation. No melena or hematochezia.  GENITOURINARY: No dysuria, frequency, hematuria, or incontinence  NEUROLOGICAL: No headaches, memory loss, loss of strength, numbness, or tremors  ENDOCRINE: No heat or cold intolerance; No hair loss  MUSCULOSKELETAL: No joint pain or swelling; No muscle, back, or extremity pain  PSYCHIATRIC: No depression, anxiety, mood swings, or difficulty sleeping  HEME/LYMPH: No easy bruising, or bleeding gums  All others negative    PHYSICAL EXAM:  T(C): 36.8 (09-10-24 @ 15:30), Max: 37 (09-09-24 @ 19:34)  HR: 67 (09-10-24 @ 15:30) (63 - 67)  BP: 122/92 (09-10-24 @ 15:30) (120/61 - 160/77)  RR: 18 (09-10-24 @ 15:30) (17 - 19)  SpO2: 97% (09-10-24 @ 15:30) (95% - 100%)  Wt(kg): --  I&O's Summary    Height (cm): 149.9 (09-09 @ 22:50)  Weight (kg): 89.1 (09-09 @ 22:50)  BMI (kg/m2): 39.7 (09-09 @ 22:50)  BSA (m2): 1.83 (09-09 @ 22:50)    GENERAL: NAD  EYES: EOMI, PERRLA, conjunctiva and sclera clear  ENMT: No tonsillar erythema, exudates, or enlargement; Moist mucous membranes, Good dentition, No lesions  Cardiovascular: Normal S1 S2, No JVD, No murmurs, No edema  Respiratory: Lungs clear to auscultation	  Gastrointestinal:  Soft, Non-tender, + BS	  Extremities: Normal range of motion, No clubbing, cyanosis, Trace BLE pitting edema  LYMPH: No lymphadenopathy noted  NERVOUS SYSTEM:  Alert & Oriented X3, Good concentration; Motor Strength 5/5 B/L upper and lower extremities; DTRs 2+ intact and symmetric                                    13.5   7.97  )-----------( 226      ( 10 Sep 2024 07:19 )             39.6     09-10    142  |  105  |  16  ----------------------------<  191<H>  3.8   |  25  |  1.40<H>    Ca    9.9      10 Sep 2024 07:19    TPro  6.6  /  Alb  3.6  /  TBili  0.4  /  DBili  x   /  AST  13  /  ALT  12  /  AlkPhos  56  09-10    proBNP:   Lipid Profile:   HgA1c:   TSH:     Consultant(s) Notes Reviewed:  [x ] YES  [ ] NO    Care Discussed with Consultants/Other Providers [ x] YES  [ ] NO    Imaging Personally Reviewed independently:  [x] YES  [ ] NO    All labs, radiologic studies, vitals, orders and medications list reviewed. Patient is seen and examined at bedside. Case discussed with medical team.

## 2024-09-10 NOTE — PROVIDER CONTACT NOTE (OTHER) - ASSESSMENT
PT Aox4. HTN, all other VSS. Pt c/o SOB, O2 sat 96%. No use of accessory muscles. RR WNL. Pt states chest discomfort feels like something heavy in on her chest. Home

## 2024-09-10 NOTE — PROGRESS NOTE ADULT - PROBLEM SELECTOR PLAN 4
on home short acting 15 U bid before breakfast and dinner as well as 20 long acting qhs  will c/w 20% reduction

## 2024-09-10 NOTE — PATIENT PROFILE ADULT - FALL HARM RISK - HARM RISK INTERVENTIONS

## 2024-09-10 NOTE — PHARMACOTHERAPY INTERVENTION NOTE - COMMENTS
Medication history is complete. Medication list updated in Outpatient Medication Record ([facility]). Sources: [facility], Patient    Home Medications:  amLODIPine 5 mg oral tablet: 1 tab(s) orally once a day (10 Sep 2024 10:15)  aspirin 81 mg oral delayed release tablet: 1 tab(s) orally once a day (10 Sep 2024 10:21)  atenolol 50 mg oral tablet: 2 tab(s) orally once a day (10 Sep 2024 10:15)  Basaglar KwikPen 100 units/mL subcutaneous solution: 20 unit(s) subcutaneous once a day (10 Sep 2024 10:20)  Evista 60 mg oral tablet: 1 tab(s) orally once a day (10 Sep 2024 10:15)  gabapentin 100 mg oral capsule: 1 cap(s) orally 3 times a day ...(usually takes 2 times a day as per patient) (10 Sep 2024 10:20)  losartan-hydroCHLOROthiazide 100 mg-25 mg oral tablet: 1 tab(s) orally once a day (10 Sep 2024 10:20)  metFORMIN 1000 mg oral tablet: 1 tab(s) orally 2 times a day (10 Sep 2024 10:15)  NovoLOG 100 units/mL subcutaneous solution: 15  subcutaneous 2 times a day (10 Sep 2024 10:15)  pravastatin 20 mg oral tablet: 1 tab(s) orally once a day (10 Sep 2024 10:15)  Tears Naturale ophthalmic solution: 1 drop(s) in each eye 2 times a day (10 Sep 2024 10:20)  Vitamin B12: 2000 microgram(s) orally once a day (10 Sep 2024 10:15)  Vitamin D3 1000 intl units oral tablet: 2 tab(s) orally once a day (10 Sep 2024 10:20)

## 2024-09-10 NOTE — CONSULT NOTE ADULT - ASSESSMENT
EKG NSR 62      A/P    1. HTN  -  usual -130s per Pt  - on amlodipine 5mg, atenolol 50 bid, HCTZ 25, losartan 100  - increase amlodipine to 10mg for better BP control  - echo pending  - need tele monitor      2. chest heaviness  - cardiac cath 2011 D1: 70 %  - trop 13->14, EKG ok, CXR ok  - get an echo  - NST to r/o ischemia  - c/w asa and statin  - c/w tele monitor    3. HLD  -c/w statin   EKG NSR 62      A/P    1. HTN  -  usual -130s per Pt  - on amlodipine 5mg, atenolol 50 bid, HCTZ 25, losartan 100  - increase amlodipine to 10mg for better BP control  - echo pending  - needs tele monitor      2. chest heaviness  - cardiac cath 2011 D1: 70 %  - trop 13->14, EKG ok, CXR ok  - get an echo  - NST to r/o ischemia  - c/w asa and statin  - c/w tele monitor    3. HLD  -c/w statin

## 2024-09-10 NOTE — PROVIDER CONTACT NOTE (OTHER) - BACKGROUND
Pt admitted with HTN and chest pain. Pt admitted with HTN and chest pain.  Upon initial assessment around 8:30am patient denied chest pain and SOB.

## 2024-09-11 ENCOUNTER — TRANSCRIPTION ENCOUNTER (OUTPATIENT)
Age: 79
End: 2024-09-11

## 2024-09-11 ENCOUNTER — RESULT REVIEW (OUTPATIENT)
Age: 79
End: 2024-09-11

## 2024-09-11 VITALS
SYSTOLIC BLOOD PRESSURE: 138 MMHG | OXYGEN SATURATION: 98 % | TEMPERATURE: 98 F | HEART RATE: 55 BPM | DIASTOLIC BLOOD PRESSURE: 72 MMHG | RESPIRATION RATE: 18 BRPM

## 2024-09-11 LAB
ALBUMIN SERPL ELPH-MCNC: 3.9 G/DL — SIGNIFICANT CHANGE UP (ref 3.3–5)
ALP SERPL-CCNC: 59 U/L — SIGNIFICANT CHANGE UP (ref 40–120)
ALT FLD-CCNC: 14 U/L — SIGNIFICANT CHANGE UP (ref 4–33)
ANION GAP SERPL CALC-SCNC: 14 MMOL/L — SIGNIFICANT CHANGE UP (ref 7–14)
AST SERPL-CCNC: 16 U/L — SIGNIFICANT CHANGE UP (ref 4–32)
BASOPHILS # BLD AUTO: 0.03 K/UL — SIGNIFICANT CHANGE UP (ref 0–0.2)
BASOPHILS NFR BLD AUTO: 0.4 % — SIGNIFICANT CHANGE UP (ref 0–2)
BILIRUB SERPL-MCNC: 0.4 MG/DL — SIGNIFICANT CHANGE UP (ref 0.2–1.2)
BUN SERPL-MCNC: 22 MG/DL — SIGNIFICANT CHANGE UP (ref 7–23)
CALCIUM SERPL-MCNC: 9.7 MG/DL — SIGNIFICANT CHANGE UP (ref 8.4–10.5)
CHLORIDE SERPL-SCNC: 99 MMOL/L — SIGNIFICANT CHANGE UP (ref 98–107)
CO2 SERPL-SCNC: 25 MMOL/L — SIGNIFICANT CHANGE UP (ref 22–31)
CREAT SERPL-MCNC: 1.45 MG/DL — HIGH (ref 0.5–1.3)
EGFR: 36 ML/MIN/1.73M2 — LOW
EOSINOPHIL # BLD AUTO: 0.11 K/UL — SIGNIFICANT CHANGE UP (ref 0–0.5)
EOSINOPHIL NFR BLD AUTO: 1.4 % — SIGNIFICANT CHANGE UP (ref 0–6)
GLUCOSE BLDC GLUCOMTR-MCNC: 172 MG/DL — HIGH (ref 70–99)
GLUCOSE BLDC GLUCOMTR-MCNC: 228 MG/DL — HIGH (ref 70–99)
GLUCOSE BLDC GLUCOMTR-MCNC: 250 MG/DL — HIGH (ref 70–99)
GLUCOSE SERPL-MCNC: 223 MG/DL — HIGH (ref 70–99)
HCT VFR BLD CALC: 40.9 % — SIGNIFICANT CHANGE UP (ref 34.5–45)
HGB BLD-MCNC: 14.1 G/DL — SIGNIFICANT CHANGE UP (ref 11.5–15.5)
IANC: 3.69 K/UL — SIGNIFICANT CHANGE UP (ref 1.8–7.4)
IMM GRANULOCYTES NFR BLD AUTO: 0.6 % — SIGNIFICANT CHANGE UP (ref 0–0.9)
LYMPHOCYTES # BLD AUTO: 3.35 K/UL — HIGH (ref 1–3.3)
LYMPHOCYTES # BLD AUTO: 42.5 % — SIGNIFICANT CHANGE UP (ref 13–44)
MCHC RBC-ENTMCNC: 31.1 PG — SIGNIFICANT CHANGE UP (ref 27–34)
MCHC RBC-ENTMCNC: 34.5 GM/DL — SIGNIFICANT CHANGE UP (ref 32–36)
MCV RBC AUTO: 90.1 FL — SIGNIFICANT CHANGE UP (ref 80–100)
MONOCYTES # BLD AUTO: 0.65 K/UL — SIGNIFICANT CHANGE UP (ref 0–0.9)
MONOCYTES NFR BLD AUTO: 8.2 % — SIGNIFICANT CHANGE UP (ref 2–14)
NEUTROPHILS # BLD AUTO: 3.69 K/UL — SIGNIFICANT CHANGE UP (ref 1.8–7.4)
NEUTROPHILS NFR BLD AUTO: 46.9 % — SIGNIFICANT CHANGE UP (ref 43–77)
NRBC # BLD: 0 /100 WBCS — SIGNIFICANT CHANGE UP (ref 0–0)
NRBC # FLD: 0 K/UL — SIGNIFICANT CHANGE UP (ref 0–0)
PLATELET # BLD AUTO: 238 K/UL — SIGNIFICANT CHANGE UP (ref 150–400)
POTASSIUM SERPL-MCNC: 3.8 MMOL/L — SIGNIFICANT CHANGE UP (ref 3.5–5.3)
POTASSIUM SERPL-SCNC: 3.8 MMOL/L — SIGNIFICANT CHANGE UP (ref 3.5–5.3)
PROT SERPL-MCNC: 6.9 G/DL — SIGNIFICANT CHANGE UP (ref 6–8.3)
RBC # BLD: 4.54 M/UL — SIGNIFICANT CHANGE UP (ref 3.8–5.2)
RBC # FLD: 12.9 % — SIGNIFICANT CHANGE UP (ref 10.3–14.5)
SODIUM SERPL-SCNC: 138 MMOL/L — SIGNIFICANT CHANGE UP (ref 135–145)
WBC # BLD: 7.88 K/UL — SIGNIFICANT CHANGE UP (ref 3.8–10.5)
WBC # FLD AUTO: 7.88 K/UL — SIGNIFICANT CHANGE UP (ref 3.8–10.5)

## 2024-09-11 PROCEDURE — 78451 HT MUSCLE IMAGE SPECT SING: CPT | Mod: 26

## 2024-09-11 PROCEDURE — 93016 CV STRESS TEST SUPVJ ONLY: CPT | Mod: GC

## 2024-09-11 PROCEDURE — 93306 TTE W/DOPPLER COMPLETE: CPT | Mod: 26

## 2024-09-11 PROCEDURE — 93018 CV STRESS TEST I&R ONLY: CPT | Mod: GC

## 2024-09-11 RX ORDER — AMLODIPINE BESYLATE 10 MG/1
1 TABLET ORAL
Qty: 30 | Refills: 0
Start: 2024-09-11 | End: 2024-10-10

## 2024-09-11 RX ADMIN — Medication 12 UNIT(S): at 18:00

## 2024-09-11 RX ADMIN — Medication 12 UNIT(S): at 09:51

## 2024-09-11 RX ADMIN — RALOXIFENE HYDROCHLORIDE 60 MILLIGRAM(S): 60 TABLET ORAL at 11:54

## 2024-09-11 RX ADMIN — Medication 2000 MICROGRAM(S): at 11:54

## 2024-09-11 RX ADMIN — Medication 50 MILLIGRAM(S): at 05:49

## 2024-09-11 RX ADMIN — ENOXAPARIN SODIUM 40 MILLIGRAM(S): 100 INJECTION SUBCUTANEOUS at 11:51

## 2024-09-11 RX ADMIN — Medication 2000 UNIT(S): at 11:52

## 2024-09-11 RX ADMIN — Medication 81 MILLIGRAM(S): at 11:51

## 2024-09-11 RX ADMIN — Medication 50 MILLIGRAM(S): at 17:17

## 2024-09-11 RX ADMIN — AMLODIPINE BESYLATE 10 MILLIGRAM(S): 10 TABLET ORAL at 05:49

## 2024-09-11 RX ADMIN — LOSARTAN POTASSIUM 100 MILLIGRAM(S): 50 TABLET ORAL at 05:49

## 2024-09-11 NOTE — DISCHARGE NOTE NURSING/CASE MANAGEMENT/SOCIAL WORK - PATIENT PORTAL LINK FT
You can access the FollowMyHealth Patient Portal offered by [facility] by registering at the following website: [email or web address]. By joining [facility]’s FollowMyHealth portal, you will also be able to view your health information using other applications (apps) compatible with our system.

## 2024-09-11 NOTE — DISCHARGE NOTE PROVIDER - NSDCMRMEDTOKEN_GEN_ALL_CORE_FT
amLODIPine 5 mg oral tablet: 1 tab(s) orally once a day  aspirin 81 mg oral delayed release tablet: 1 tab(s) orally once a day  atenolol 50 mg oral tablet: 2 tab(s) orally once a day  Basaglar KwikPen 100 units/mL subcutaneous solution: 20 unit(s) subcutaneous once a day  Evista 60 mg oral tablet: 1 tab(s) orally once a day  gabapentin 100 mg oral capsule: 1 cap(s) orally 3 times a day ...(usually takes 2 times a day as per patient)  losartan-hydroCHLOROthiazide 100 mg-25 mg oral tablet: 1 tab(s) orally once a day  metFORMIN 1000 mg oral tablet: 1 tab(s) orally 2 times a day  NovoLOG 100 units/mL subcutaneous solution: 15  subcutaneous 2 times a day  pravastatin 20 mg oral tablet: 1 tab(s) orally once a day  Tears Naturale ophthalmic solution: 1 drop(s) in each eye 2 times a day  Vitamin B12: 2000 microgram(s) orally once a day  Vitamin D3 1000 intl units oral tablet: 2 tab(s) orally once a day   amLODIPine 10 mg oral tablet: 1 tab(s) orally once a day  aspirin 81 mg oral delayed release tablet: 1 tab(s) orally once a day  atenolol 50 mg oral tablet: 2 tab(s) orally once a day  Basaglar KwikPen 100 units/mL subcutaneous solution: 20 unit(s) subcutaneous once a day  Evista 60 mg oral tablet: 1 tab(s) orally once a day  gabapentin 100 mg oral capsule: 1 cap(s) orally 3 times a day ...(usually takes 2 times a day as per patient)  losartan-hydroCHLOROthiazide 100 mg-25 mg oral tablet: 1 tab(s) orally once a day  metFORMIN 1000 mg oral tablet: 1 tab(s) orally 2 times a day  NovoLOG 100 units/mL subcutaneous solution: 15  subcutaneous 2 times a day  pravastatin 20 mg oral tablet: 1 tab(s) orally once a day  Tears Naturale ophthalmic solution: 1 drop(s) in each eye 2 times a day  Vitamin B12: 2000 microgram(s) orally once a day  Vitamin D3 1000 intl units oral tablet: 2 tab(s) orally once a day

## 2024-09-11 NOTE — PROGRESS NOTE ADULT - PROBLEM SELECTOR PLAN 2
seen by renal, who note baseline renal function; c/w current regimen, follow renal function
seen by renal, who note baseline renal function; c/w current regimen, follow renal function

## 2024-09-11 NOTE — PROGRESS NOTE ADULT - SUBJECTIVE AND OBJECTIVE BOX
Name of Patient : MACK MARAVILLA  MRN: [id number]      Subjective: Patient seen and examined. No new events except as noted.     REVIEW OF SYSTEMS:    CONSTITUTIONAL: No weakness, fevers or chills  EYES/ENT: No visual changes;  No vertigo or throat pain   NECK: No pain or stiffness  RESPIRATORY: No cough, wheezing, hemoptysis; No shortness of breath  CARDIOVASCULAR: No chest pain or palpitations  GASTROINTESTINAL: No abdominal or epigastric pain. No nausea, vomiting, or hematemesis; No diarrhea or constipation. No melena or hematochezia.  GENITOURINARY: No dysuria, frequency or hematuria  NEUROLOGICAL: No numbness or weakness  SKIN: No itching, burning, rashes, or lesions   All other review of systems is negative unless indicated above.    MEDICATIONS:  MEDICATIONS  (STANDING):  amLODIPine   Tablet 10 milliGRAM(s) Oral daily  aspirin enteric coated 81 milliGRAM(s) Oral daily  atenolol  Tablet 50 milliGRAM(s) Oral every 12 hours  atorvastatin 10 milliGRAM(s) Oral at bedtime  cholecalciferol 2000 Unit(s) Oral daily  cyanocobalamin 2000 MICROGram(s) Oral daily  dextrose 5%. 1000 milliLiter(s) (100 mL/Hr) IV Continuous <Continuous>  dextrose 5%. 1000 milliLiter(s) (50 mL/Hr) IV Continuous <Continuous>  dextrose 50% Injectable 25 Gram(s) IV Push once  dextrose 50% Injectable 25 Gram(s) IV Push once  dextrose 50% Injectable 12.5 Gram(s) IV Push once  enoxaparin Injectable 40 milliGRAM(s) SubCutaneous every 24 hours  glucagon  Injectable 1 milliGRAM(s) IntraMuscular once  hydrochlorothiazide 25 milliGRAM(s) Oral daily  insulin glargine Injectable (LANTUS) 15 Unit(s) SubCutaneous at bedtime  insulin lispro Injectable (ADMELOG) 12 Unit(s) SubCutaneous before breakfast  insulin lispro Injectable (ADMELOG) 12 Unit(s) SubCutaneous before dinner  losartan 100 milliGRAM(s) Oral daily  raloxifene 60 milliGRAM(s) Oral daily      PHYSICAL EXAM:  T(C): 36.6 (09-11-24 @ 17:17), Max: 36.7 (09-11-24 @ 05:45)  HR: 55 (09-11-24 @ 17:17) (55 - 64)  BP: 138/72 (09-11-24 @ 17:17) (127/63 - 138/72)  RR: 18 (09-11-24 @ 17:17) (17 - 18)  SpO2: 98% (09-11-24 @ 17:17) (97% - 100%)  Wt(kg): --  I&O's Summary        Appearance: Normal	  HEENT:  PERRLA   Lymphatic: No lymphadenopathy   Cardiovascular: Normal S1 S2, no JVD  Respiratory: normal effort , clear  Gastrointestinal:  Soft, Non-tender  Skin: No rashes,  warm to touch  Psychiatry:  Mood & affect appropriate  Musculuskeletal: No edema    recent labs, Imaging and EKGs personally reviewed                           14.1   7.88  )-----------( 238      ( 11 Sep 2024 06:21 )             40.9               09-11    138  |  99  |  22  ----------------------------<  223<H>  3.8   |  25  |  1.45<H>    Ca    9.7      11 Sep 2024 06:21    TPro  6.9  /  Alb  3.9  /  TBili  0.4  /  DBili  x   /  AST  16  /  ALT  14  /  AlkPhos  59  09-11                       Urinalysis Basic - ( 11 Sep 2024 06:21 )    Color: x / Appearance: x / SG: x / pH: x  Gluc: 223 mg/dL / Ketone: x  / Bili: x / Urobili: x   Blood: x / Protein: x / Nitrite: x   Leuk Esterase: x / RBC: x / WBC x   Sq Epi: x / Non Sq Epi: x / Bacteria: x                    
[facility] [place] [facility]   MD DARCY CONNELLY MD ANGELA WONG, [place]    TEL:  OFFICE: [phone]  From 5pm-7am Answering Service [phone]    -- RENAL FOLLOW UP NOTE ---Date of Service 09-10-24 @ 12:47    Patient is a 79y old  Female who presents with a chief complaint of labile htn (09 Sep 2024 21:16)      Patient seen and examined at bedside. No chest pain/sob    VITALS:  T(F): 98.1 (09-10-24 @ 09:01), Max: 98.7 (09-09-24 @ 15:24)  HR: 65 (09-10-24 @ 09:01)  BP: 160/77 (09-10-24 @ 09:01)  RR: 18 (09-10-24 @ 09:01)  SpO2: 96% (09-10-24 @ 09:01)  Wt(kg): --    Height (cm): 149.9 (09-09 @ 22:50)  Weight (kg): 89.1 (09-09 @ 22:50)  BMI (kg/m2): 39.7 (09-09 @ 22:50)  BSA (m2): 1.83 (09-09 @ 22:50)    PHYSICAL EXAM:  General: NAD  Neck: No JVD  Respiratory: CTAB, no wheezes, rales or rhonchi  Cardiovascular: S1, S2, RRR  Gastrointestinal: BS+, soft, NT/ND  Extremities: No peripheral edema    Hospital Medications:   MEDICATIONS  (STANDING):  amLODIPine   Tablet 5 milliGRAM(s) Oral daily  aspirin enteric coated 81 milliGRAM(s) Oral daily  atenolol  Tablet 50 milliGRAM(s) Oral every 12 hours  atorvastatin 10 milliGRAM(s) Oral at bedtime  cholecalciferol 2000 Unit(s) Oral daily  cyanocobalamin 2000 MICROGram(s) Oral daily  dextrose 5%. 1000 milliLiter(s) (100 mL/Hr) IV Continuous <Continuous>  dextrose 5%. 1000 milliLiter(s) (50 mL/Hr) IV Continuous <Continuous>  dextrose 50% Injectable 25 Gram(s) IV Push once  dextrose 50% Injectable 25 Gram(s) IV Push once  dextrose 50% Injectable 12.5 Gram(s) IV Push once  enoxaparin Injectable 40 milliGRAM(s) SubCutaneous every 24 hours  glucagon  Injectable 1 milliGRAM(s) IntraMuscular once  hydrochlorothiazide 25 milliGRAM(s) Oral daily  insulin glargine Injectable (LANTUS) 15 Unit(s) SubCutaneous at bedtime  insulin lispro Injectable (ADMELOG) 12 Unit(s) SubCutaneous before breakfast  insulin lispro Injectable (ADMELOG) 12 Unit(s) SubCutaneous before dinner  losartan 100 milliGRAM(s) Oral daily  raloxifene 60 milliGRAM(s) Oral daily      LABS:  09-10    142  |  105  |  16  ----------------------------<  191<H>  3.8   |  25  |  1.40<H>    Ca    9.9      10 Sep 2024 07:19    TPro  6.6  /  Alb  3.6  /  TBili  0.4  /  DBili      /  AST  13  /  ALT  12  /  AlkPhos  56  09-10    Creatinine Trend: 1.40 <--, 1.35 <--    Albumin: 3.6 g/dL (09-10 @ 07:19)  Albumin: 3.8 g/dL (09-09 @ 16:27)                              13.5   7.97  )-----------( 226      ( 10 Sep 2024 07:19 )             39.6     Urine Studies:  Urinalysis - [09-10-24 @ 07:19]      Color  / Appearance  / SG  / pH       Gluc 191 / Ketone   / Bili  / Urobili        Blood  / Protein  / Leuk Est  / Nitrite       RBC  / WBC  / Hyaline  / Gran  / Sq Epi  / Non Sq Epi  / Bacteria             RADIOLOGY & ADDITIONAL STUDIES:  
Michael Sepulveda MD  Interventional Cardiology / Advance Heart Failure and Cardiac Transplant Specialist  [place] Office : [phone] [place] [address] [place] [address]  Tel:   [place] Office : [address] [facility] [place] [address]  Tel: [phone]       Pt is lying in bed comfortable not in distress, no chest pains no SOB no palpitations  	  MEDICATIONS:  amLODIPine   Tablet 10 milliGRAM(s) Oral daily  aspirin enteric coated 81 milliGRAM(s) Oral daily  atenolol  Tablet 50 milliGRAM(s) Oral every 12 hours  enoxaparin Injectable 40 milliGRAM(s) SubCutaneous every 24 hours  hydrochlorothiazide 25 milliGRAM(s) Oral daily  losartan 100 milliGRAM(s) Oral daily            atorvastatin 10 milliGRAM(s) Oral at bedtime  dextrose 50% Injectable 12.5 Gram(s) IV Push once  dextrose 50% Injectable 25 Gram(s) IV Push once  dextrose 50% Injectable 25 Gram(s) IV Push once  dextrose Oral Gel 15 Gram(s) Oral once PRN  glucagon  Injectable 1 milliGRAM(s) IntraMuscular once  insulin glargine Injectable (LANTUS) 15 Unit(s) SubCutaneous at bedtime  insulin lispro Injectable (ADMELOG) 12 Unit(s) SubCutaneous before dinner  insulin lispro Injectable (ADMELOG) 12 Unit(s) SubCutaneous before breakfast    artificial tears (preservative free) Ophthalmic Solution 1 Drop(s) Both EYES three times a day PRN  cholecalciferol 2000 Unit(s) Oral daily  cyanocobalamin 2000 MICROGram(s) Oral daily  dextrose 5%. 1000 milliLiter(s) IV Continuous <Continuous>  dextrose 5%. 1000 milliLiter(s) IV Continuous <Continuous>  raloxifene 60 milliGRAM(s) Oral daily      PAST MEDICAL/SURGICAL HISTORY  PAST MEDICAL & SURGICAL HISTORY:  Hypercholesteremia      Hypertension      DM (Diabetes Mellitus)      Generalized Osteoarthritis      Rotator cuff tear, non-traumatic, left      S/P Cholecystectomy      H/O total hysterectomy      History of arthroplasty of left shoulder          SOCIAL HISTORY: Substance Use (street drugs): ( x ) never used  (  ) other:    FAMILY HISTORY:  History of hypertension    DM (diabetes mellitus)        REVIEW OF SYSTEMS:  CONSTITUTIONAL: No fever, weight loss, or fatigue  EYES: No eye pain, visual disturbances, or discharge  ENMT:  No difficulty hearing, tinnitus, vertigo; No sinus or throat pain  BREASTS: No pain, masses, or nipple discharge  GASTROINTESTINAL: No abdominal or epigastric pain. No nausea, vomiting, or hematemesis; No diarrhea or constipation. No melena or hematochezia.  GENITOURINARY: No dysuria, frequency, hematuria, or incontinence  NEUROLOGICAL: No headaches, memory loss, loss of strength, numbness, or tremors  ENDOCRINE: No heat or cold intolerance; No hair loss  MUSCULOSKELETAL: No joint pain or swelling; No muscle, back, or extremity pain  PSYCHIATRIC: No depression, anxiety, mood swings, or difficulty sleeping  HEME/LYMPH: No easy bruising, or bleeding gums       PHYSICAL EXAM:  T(C): 36.5 (09-11-24 @ 11:30), Max: 37.1 (09-10-24 @ 21:35)  HR: 64 (09-11-24 @ 11:30) (64 - 72)  BP: 127/63 (09-11-24 @ 11:30) (127/63 - 170/84)  RR: 17 (09-11-24 @ 11:30) (17 - 18)  SpO2: 100% (09-11-24 @ 11:30) (97% - 100%)  Wt(kg): --  I&O's Summary        GENERAL: NAD  EYES:   PERRLA   ENMT:   Moist mucous membranes, Good dentition, No lesions  Cardiovascular: Normal S1 S2, No JVD, No murmurs, No edema  Respiratory: Lungs clear to auscultation	  Gastrointestinal:  Soft, Non-tender, + BS	  Extremities: no edema                                    14.1   7.88  )-----------( 238      ( 11 Sep 2024 06:21 )             40.9     09-11    138  |  99  |  22  ----------------------------<  223<H>  3.8   |  25  |  1.45<H>    Ca    9.7      11 Sep 2024 06:21    TPro  6.9  /  Alb  3.9  /  TBili  0.4  /  DBili  x   /  AST  16  /  ALT  14  /  AlkPhos  59  09-11    proBNP:   Lipid Profile:   HgA1c:   TSH:     Consultant(s) Notes Reviewed:  [x ] YES  [ ] NO    Care Discussed with Consultants/Other Providers [ x] YES  [ ] NO    Imaging Personally Reviewed independently:  [x] YES  [ ] NO    All labs, radiologic studies, vitals, orders and medications list reviewed. Patient is seen and examined at bedside. Case discussed with medical team.        
[facility]   MD DARCY CONNELLY MD ANGELA WONG, [place]    TEL:  OFFICE: [phone]  From 5pm-7am Answering Service [phone]    -- RENAL FOLLOW UP NOTE ---Date of Service 09-11-24 @ 15:26    Patient is a 79y old  Female who presents with a chief complaint of labile htn (11 Sep 2024 14:26)      Patient seen and examined at bedside. No chest pain/sob    VITALS:  T(F): 97.7 (09-11-24 @ 11:30), Max: 98.7 (09-10-24 @ 21:35)  HR: 64 (09-11-24 @ 11:30)  BP: 127/63 (09-11-24 @ 11:30)  RR: 17 (09-11-24 @ 11:30)  SpO2: 100% (09-11-24 @ 11:30)  Wt(kg): --        PHYSICAL EXAM:  General: NAD  Neck: No JVD  Respiratory: CTAB, no wheezes, rales or rhonchi  Cardiovascular: S1, S2, RRR  Gastrointestinal: BS+, soft, NT/ND  Extremities: No peripheral edema    Hospital Medications:   MEDICATIONS  (STANDING):  amLODIPine   Tablet 10 milliGRAM(s) Oral daily  aspirin enteric coated 81 milliGRAM(s) Oral daily  atenolol  Tablet 50 milliGRAM(s) Oral every 12 hours  atorvastatin 10 milliGRAM(s) Oral at bedtime  cholecalciferol 2000 Unit(s) Oral daily  cyanocobalamin 2000 MICROGram(s) Oral daily  dextrose 5%. 1000 milliLiter(s) (100 mL/Hr) IV Continuous <Continuous>  dextrose 5%. 1000 milliLiter(s) (50 mL/Hr) IV Continuous <Continuous>  dextrose 50% Injectable 12.5 Gram(s) IV Push once  dextrose 50% Injectable 25 Gram(s) IV Push once  dextrose 50% Injectable 25 Gram(s) IV Push once  enoxaparin Injectable 40 milliGRAM(s) SubCutaneous every 24 hours  glucagon  Injectable 1 milliGRAM(s) IntraMuscular once  hydrochlorothiazide 25 milliGRAM(s) Oral daily  insulin glargine Injectable (LANTUS) 15 Unit(s) SubCutaneous at bedtime  insulin lispro Injectable (ADMELOG) 12 Unit(s) SubCutaneous before dinner  insulin lispro Injectable (ADMELOG) 12 Unit(s) SubCutaneous before breakfast  losartan 100 milliGRAM(s) Oral daily  raloxifene 60 milliGRAM(s) Oral daily      LABS:  09-11    138  |  99  |  22  ----------------------------<  223<H>  3.8   |  25  |  1.45<H>    Ca    9.7      11 Sep 2024 06:21    TPro  6.9  /  Alb  3.9  /  TBili  0.4  /  DBili      /  AST  16  /  ALT  14  /  AlkPhos  59  09-11    Creatinine Trend: 1.45 <--, 1.40 <--, 1.35 <--    Albumin: 3.9 g/dL (09-11 @ 06:21)                              14.1   7.88  )-----------( 238      ( 11 Sep 2024 06:21 )             40.9     Urine Studies:  Urinalysis - [09-11-24 @ 06:21]      Color  / Appearance  / SG  / pH       Gluc 223 / Ketone   / Bili  / Urobili        Blood  / Protein  / Leuk Est  / Nitrite       RBC  / WBC  / Hyaline  / Gran  / Sq Epi  / Non Sq Epi  / Bacteria             RADIOLOGY & ADDITIONAL STUDIES:  
Name of Patient : MACK MARAVILLA  MRN: [id number]  Date of visit: 09-10-24       Subjective: Patient seen and examined. No new events except as noted.   feeling okay     REVIEW OF SYSTEMS:    CONSTITUTIONAL: No weakness, fevers or chills  EYES/ENT: No visual changes;  No vertigo or throat pain   NECK: No pain or stiffness  RESPIRATORY: No cough, wheezing, hemoptysis; No shortness of breath  CARDIOVASCULAR: No chest pain or palpitations  GASTROINTESTINAL: No abdominal or epigastric pain. No nausea, vomiting, or hematemesis; No diarrhea or constipation. No melena or hematochezia.  GENITOURINARY: No dysuria, frequency or hematuria  NEUROLOGICAL: No numbness or weakness  SKIN: No itching, burning, rashes, or lesions   All other review of systems is negative unless indicated above.    MEDICATIONS:  MEDICATIONS  (STANDING):  amLODIPine   Tablet 10 milliGRAM(s) Oral daily  aspirin enteric coated 81 milliGRAM(s) Oral daily  atenolol  Tablet 50 milliGRAM(s) Oral every 12 hours  atorvastatin 10 milliGRAM(s) Oral at bedtime  cholecalciferol 2000 Unit(s) Oral daily  cyanocobalamin 2000 MICROGram(s) Oral daily  dextrose 5%. 1000 milliLiter(s) (100 mL/Hr) IV Continuous <Continuous>  dextrose 5%. 1000 milliLiter(s) (50 mL/Hr) IV Continuous <Continuous>  dextrose 50% Injectable 25 Gram(s) IV Push once  dextrose 50% Injectable 25 Gram(s) IV Push once  dextrose 50% Injectable 12.5 Gram(s) IV Push once  enoxaparin Injectable 40 milliGRAM(s) SubCutaneous every 24 hours  glucagon  Injectable 1 milliGRAM(s) IntraMuscular once  hydrochlorothiazide 25 milliGRAM(s) Oral daily  insulin glargine Injectable (LANTUS) 15 Unit(s) SubCutaneous at bedtime  insulin lispro Injectable (ADMELOG) 12 Unit(s) SubCutaneous before dinner  insulin lispro Injectable (ADMELOG) 12 Unit(s) SubCutaneous before breakfast  losartan 100 milliGRAM(s) Oral daily  raloxifene 60 milliGRAM(s) Oral daily      PHYSICAL EXAM:  T(C): 36.9 (09-10-24 @ 18:19), Max: 36.9 (09-09-24 @ 22:50)  HR: 72 (09-10-24 @ 18:19) (63 - 72)  BP: 157/90 (09-10-24 @ 18:19) (120/61 - 160/77)  RR: 18 (09-10-24 @ 18:19) (17 - 18)  SpO2: 98% (09-10-24 @ 18:19) (95% - 98%)  Wt(kg): --  I&O's Summary    Height (cm): 149.9 (09-09 @ 22:50)  Weight (kg): 89.1 (09-09 @ 22:50)  BMI (kg/m2): 39.7 (09-09 @ 22:50)  BSA (m2): 1.83 (09-09 @ 22:50)    Appearance: Normal	  HEENT:  PERRLA   Lymphatic: No lymphadenopathy   Cardiovascular: Normal S1 S2, no JVD  Respiratory: normal effort , clear  Gastrointestinal:  Soft, Non-tender  Skin: No rashes,  warm to touch  Psychiatry:  Mood & affect appropriate  Musculuskeletal: No edema    recent labs, Imaging and EKGs personally reviewed                           13.5   7.97  )-----------( 226      ( 10 Sep 2024 07:19 )             39.6               09-10    142  |  105  |  16  ----------------------------<  191<H>  3.8   |  25  |  1.40<H>    Ca    9.9      10 Sep 2024 07:19    TPro  6.6  /  Alb  3.6  /  TBili  0.4  /  DBili  x   /  AST  13  /  ALT  12  /  AlkPhos  56  09-10    PT/INR - ( 09 Sep 2024 16:27 )   PT: 10.8 sec;   INR: 0.96 ratio         PTT - ( 09 Sep 2024 16:27 )  PTT:34.2 sec                   Urinalysis Basic - ( 10 Sep 2024 07:19 )    Color: x / Appearance: x / SG: x / pH: x  Gluc: 191 mg/dL / Ketone: x  / Bili: x / Urobili: x   Blood: x / Protein: x / Nitrite: x   Leuk Esterase: x / RBC: x / WBC x   Sq Epi: x / Non Sq Epi: x / Bacteria: x

## 2024-09-11 NOTE — PROGRESS NOTE ADULT - PROBLEM SELECTOR PLAN 1
-associated with frontal HA. Unclear if HA precipitating elevated BP or vice versa. As pt has no recent change to BP regimen, unclear cause for new onset lability. Would consider  evaluating for endocrinopathy, but would fist monitor pt here and follow vitals while on her usual BP med regimen   -fall precautions   -tte, orthostatics
-associated with frontal HA. Unclear if HA precipitating elevated BP or vice versa. As pt has no recent change to BP regimen, unclear cause for new onset lability. Would consider  evaluating for endocrinopathy, but would fist monitor pt here and follow vitals while on her usual BP med regimen   -fall precautions   -tte, orthostatics, noted

## 2024-09-11 NOTE — DISCHARGE NOTE PROVIDER - NSDCFUADDAPPT_GEN_ALL_CORE_FT
Please follow up with your primary care provider and cardiologist in 1-2 weeks following discharge from the hospital for continued monitoring and management.

## 2024-09-11 NOTE — PROGRESS NOTE ADULT - PROBLEM SELECTOR PLAN 3
-intermittent chest heaviness over last few days. Denies h/o cad; negative stress test noted in Oct 2018, denies undergoing stress test since Trop 13, ekg non-ischemic. On Evista for osteoporosis.  Denies calf tenderness, leg swelling  -trend trops to peak  -card eval appreciated   -plan for echo and stress test
-intermittent chest heaviness over last few days. Denies h/o cad; negative stress test noted in Oct 2018, denies undergoing stress test since Trop 13, ekg non-ischemic. On Evista for osteoporosis.  Denies calf tenderness, leg swelling  -trend trops to peak  -card eval appreciated   -plan for echo and stress test, noted, negative

## 2024-09-11 NOTE — PROGRESS NOTE ADULT - ASSESSMENT
78 yo F with PMH of HTN, OA, Hypercholesteremia, CKD, presents to the ED with HTN and headache. Nephrology consulted for CKD.     A/P   CKD 3b  follows up with Dr. Miller   baseline scr 1.1-1.3  admitted with scr 1.35  scr at baseline  avoid nephrotoxins if possible   monitor UO and bmp     HTN   bp better  on norvasc 10  low sodium diet  monitor bp     
EKG NSR 62  TTE < from: TTE W or WO Ultrasound Enhancing Agent (09.11.24 @ 07:37) >   1. Left ventricular cavity is normal in size. Left ventricular wall thickness is normal. Left ventricular systolic function is normal with an ejection fraction of 62 % by Rodrigues's method of disks. There are no regional wall motion abnormalities seen.   2. There is mild (grade 1) left ventricular diastolic dysfunction.   3. Normal right ventricular cavity size and normal right ventricular systolic function.   4. Structurally normal mitral valve with normal leaflet excursion. There is calcification of the mitral valve annulus. There is mild mitral regurgitation.    NST 9/11 normal      A/P    1. HTN  -  usual -130s per Pt  - on  atenolol 50 bid, HCTZ 25, losartan 100, and amlodipine increased to 10mg  - echo normal LV sys function      2. chest heaviness  - cardiac cath 2011 D1: 70 %  - trop 13->14, EKG ok, CXR ok  - echo normal LV sys function  - NST to r/o ischemia, shows normal  - c/w asa and statin  - c/w tele monitor    3. HLD  -c/w statin  
78 yo F with PMH of HTN, OA, Hypercholesteremia, CKD, presents to the ED with HTN and headache. Nephrology consulted for CKD.     A/P   CKD 3b  follows up with Dr. Miller   baseline scr 1.1-1.3  admitted with scr 1.35  scr at baseline  avoid nephrotoxins if possible   monitor UO and bmp     HTN   bp fluctuating  up titrate norvasc 10 if bp persistently >150  low sodium diet  monitor bp     
80 yo f with labile htn, HA, intermittent chest heaviness 
78 yo f with labile htn, HA, intermittent chest heaviness

## 2024-09-11 NOTE — DISCHARGE NOTE PROVIDER - PROVIDER TOKENS
Price (Do Not Change): 0.00
Instructions: This plan will send the code FBSE to the PM system.  DO NOT or CHANGE the price.
Detail Level: Simple
PROVIDER:[TOKEN:[[id number]]]

## 2024-09-11 NOTE — DISCHARGE NOTE PROVIDER - NSDCCPTREATMENT_GEN_ALL_CORE_FT
PRINCIPAL PROCEDURE  Procedure: Transthoracic echocardiography (TTE)  Findings and Treatment: 1. Left ventricular cavity is normal in size. Left ventricular wall thickness is normal. Left ventricular systolic function is normal with an ejection fraction of 62 % by Rodrigues's method of disks. There are no regional wall motion abnormalities seen.   2. There is mild (grade 1) left ventricular diastolic dysfunction.   3. Normal right ventricular cavity size and normal right ventricular systolic function.   4. Structurally normal mitral valve with normal leaflet excursion. There is calcification of the mitral valve annulus. There is mild mitral regurgitation.      SECONDARY PROCEDURE  Procedure: NM thallium stress test  Findings and Treatment: NST Normal myocardial perfusion scan, with no evidence of infarction or inducible ischemia.

## 2024-09-11 NOTE — DISCHARGE NOTE PROVIDER - CARE PROVIDER_API CALL
Bebeto Carmichael  Family Medicine  [address] [place] [address]  Phone: [phone]  Fax: [phone]  Follow Up Time:

## 2024-09-11 NOTE — DISCHARGE NOTE PROVIDER - NSDCCPCAREPLAN_GEN_ALL_CORE_FT
PRINCIPAL DISCHARGE DIAGNOSIS  Diagnosis: Chest pain  Assessment and Plan of Treatment: You came in with elevated BP and chest pressure.      SECONDARY DISCHARGE DIAGNOSES  Diagnosis: Hypertension  Assessment and Plan of Treatment:      PRINCIPAL DISCHARGE DIAGNOSIS  Diagnosis: Chest pain  Assessment and Plan of Treatment: You came in with elevated BP and chest pressure. We evaluated you with an ultrasound of the heart and a stress test. Both of these tests found that your heart had normla structure and qwas beating appropriately/ responding to stress appropriately. Your BP improved after we increased your norvasc to 10mg. Please continue to take this dose at home and follow up with your primary care provider for adjustments within the next 1-2 weeks.

## 2024-09-11 NOTE — DISCHARGE NOTE PROVIDER - NSDCFUSCHEDAPPT_GEN_ALL_CORE_FT
Rhonda Malhotra  [facility] Physician [facility]  CARDIOLOGY [id number]  Scheduled Appointment: 12/09/2024

## 2024-09-11 NOTE — DISCHARGE NOTE PROVIDER - HOSPITAL COURSE
80 yo f with past medical history of HLD, HTN, DM, OA on Evista, presenting for hypertension. Reporting intermittent chest pressure at home, with BP 170s systolic on typical home BP regiment, for which she reports compliance (baseline sBP 120-130). Cardiology consulted. TTE ____. NST demonstrated normal myocardial perfusion with no evidence of infarction or inducible ischemia. BP stable at 120-140s systolic range while inpatient. Cardiology cleared patient, no change to BP regimen.     On 9/11/24, discussed with Dr. Dill, patient is medically cleared and optimized for discharge today. All medications were reviewed with attending, and sent to mutually agreed upon pharmacy.  Reviewed discharge medications with patient; All new medications requiring new prescription sent to pharmacy of patients choice. Reviewed need for prescription for previous home medications and new prescriptions sent if requested. Patient in agreement and understands.      78 yo f with past medical history of HLD, HTN, DM, OA on Evista, presenting for hypertension. Reporting intermittent chest pressure at home, with BP 170s systolic on typical home BP regiment, for which she reports compliance (baseline sBP 120-130). Cardiology consulted. TTE normal structure and function, EF 65%. NST demonstrated normal myocardial perfusion with no evidence of infarction or inducible ischemia. BP stable at 120-140s systolic range while inpatient. Cardiology cleared patient, no change to BP regimen.     On 9/11/24, discussed with Dr. Dill, patient is medically cleared and optimized for discharge today. All medications were reviewed with attending, and sent to mutually agreed upon pharmacy.  Reviewed discharge medications with patient; All new medications requiring new prescription sent to pharmacy of patients choice. Reviewed need for prescription for previous home medications and new prescriptions sent if requested. Patient in agreement and understands.

## 2024-09-12 ENCOUNTER — TRANSCRIPTION ENCOUNTER (OUTPATIENT)
Age: 79
End: 2024-09-12

## 2024-09-13 ENCOUNTER — NON-APPOINTMENT (OUTPATIENT)
Age: 79
End: 2024-09-13

## 2024-09-13 ENCOUNTER — APPOINTMENT (OUTPATIENT)
Age: 79
End: 2024-09-13
Payer: MEDICARE

## 2024-09-13 DIAGNOSIS — E78.00 PURE HYPERCHOLESTEROLEMIA, UNSPECIFIED: ICD-10-CM

## 2024-09-13 DIAGNOSIS — I10 ESSENTIAL (PRIMARY) HYPERTENSION: ICD-10-CM

## 2024-09-13 DIAGNOSIS — E11.9 TYPE 2 DIABETES MELLITUS W/OUT COMPLICATIONS: ICD-10-CM

## 2024-09-13 PROCEDURE — 99442: CPT | Mod: 93

## 2024-09-16 NOTE — PHYSICAL EXAM
[de-identified] : Physical exam limited d/t telephonic encounter, pt A&O x3 and conversing with no distress noted

## 2024-09-16 NOTE — PLAN
[FreeTextEntry1] : Ms. Patel is a 79 year old female with past medical history of HLD, HTN, DM, OA on Evista, presented for hypertension and intermittent chest pressure at home. She was admitted from 9/9-9/11 and seen by cardiology. TTE normal structure and function, EF 65%. NST demonstrated normal myocardial perfusion with no evidence of infarction or inducible ischemia. BP stable at 120-140s systolic range while inpatient. Cardiology cleared patient, no change to BP regimen. Pt was discharged home for outpatient follow up. Seen today for follow up.  -Has optha appt 9/16 -Follow up with PCP Dr. Carmichael, I s/w office, appt scheduled for 9/18 12:45PM -Follow up with cards Dr. Landis, pt s/w MD today  [employer] TCM teaching: Adhere to low salt, low fat diet and educated patient on foods that should be avoided such as processed or fast food. Continue medications as ordered. Follow up with PCP and Cardiologist. Contact information given, patient advised to call with any questions/concerns.

## 2024-09-16 NOTE — PLAN
[FreeTextEntry1] : Ms. Patel is a 79 year old female with past medical history of HLD, HTN, DM, OA on Evista, presented for hypertension and intermittent chest pressure at home. She was admitted from 9/9-9/11 and seen by cardiology. TTE normal structure and function, EF 65%. NST demonstrated normal myocardial perfusion with no evidence of infarction or inducible ischemia. BP stable at 120-140s systolic range while inpatient. Cardiology cleared patient, no change to BP regimen. Pt was discharged home for outpatient follow up. Seen today for follow up.  -Has optha appt 9/16 -Follow up with PCP Dr. Carmichael, I s/w office, appt scheduled for 9/18 12:45PM -Follow up with cards Dr. Landis, pt s/w MD today  [employer] TCM teaching: Adhere to low salt, low fat diet and educated patient on foods that should be avoided such as processed or fast food. Continue medications as ordered. Follow up with PCP and Cardiologist. Contact information given, patient advised to call with any questions/concerns.

## 2024-09-16 NOTE — PHYSICAL EXAM
[de-identified] : Physical exam limited d/t telephonic encounter, pt A&O x3 and conversing with no distress noted

## 2024-09-16 NOTE — HISTORY OF PRESENT ILLNESS
[Home] : at home, [unfilled] , at the time of the visit. [Other Location: e.g. Home (Enter Location, City,State)___] : at [unfilled] [Verbal consent obtained from patient] : the patient, [unfilled] [FreeTextEntry1] : F/u post hospital discharge [facility] TCM [de-identified] : Ms. Patel is a 79 year old female with past medical history of HLD, HTN, DM, OA on Evista, presented for hypertension and intermittent chest pressure at home. She was admitted from 9/9-9/11 and seen by cardiology. TTE normal structure and function, EF 65%. NST demonstrated normal myocardial perfusion with no evidence of infarction or inducible ischemia. BP stable at 120-140s systolic range while inpatient. Cardiology cleared patient, no change to BP regimen. Pt was discharged home for outpatient follow up. Seen today for follow up.  Patient with no access to smart phone/laptop and does not have the ability to do the video telehealth, telephonic visit completed. Patient A&O x3, conversing with no distress noted. Since discharge home, she is feeling okay, denies any HA or chest pressure, and monitoring BPs at home, 130s/60-70s. Aware Amlodipine was increased to 10mg and taking as prescribed. Diet reviewed.  Pt states she is independent at baseline, ambulates with cane as needed and spouse provides transportation to [facility].

## 2024-09-16 NOTE — HISTORY OF PRESENT ILLNESS
[Home] : at home, [unfilled] , at the time of the visit. [Other Location: e.g. Home (Enter Location, City,State)___] : at [unfilled] [Verbal consent obtained from patient] : the patient, [unfilled] [FreeTextEntry1] : F/u post hospital discharge [facility] TCM [de-identified] : Ms. Patel is a 79 year old female with past medical history of HLD, HTN, DM, OA on Evista, presented for hypertension and intermittent chest pressure at home. She was admitted from 9/9-9/11 and seen by cardiology. TTE normal structure and function, EF 65%. NST demonstrated normal myocardial perfusion with no evidence of infarction or inducible ischemia. BP stable at 120-140s systolic range while inpatient. Cardiology cleared patient, no change to BP regimen. Pt was discharged home for outpatient follow up. Seen today for follow up.  Patient with no access to smart phone/laptop and does not have the ability to do the video telehealth, telephonic visit completed. Patient A&O x3, conversing with no distress noted. Since discharge home, she is feeling okay, denies any HA or chest pressure, and monitoring BPs at home, 130s/60-70s. Aware Amlodipine was increased to 10mg and taking as prescribed. Diet reviewed.  Pt states she is independent at baseline, ambulates with cane as needed and spouse provides transportation to [facility].

## 2024-09-19 ENCOUNTER — TRANSCRIPTION ENCOUNTER (OUTPATIENT)
Age: 79
End: 2024-09-19

## 2024-09-30 ENCOUNTER — TRANSCRIPTION ENCOUNTER (OUTPATIENT)
Age: 79
End: 2024-09-30

## 2024-10-08 ENCOUNTER — TRANSCRIPTION ENCOUNTER (OUTPATIENT)
Age: 79
End: 2024-10-08

## 2024-10-15 NOTE — PATIENT PROFILE ADULT - FUNCTIONAL ASSESSMENT - DAILY ACTIVITY 4.
Thank you for allowing us to participate in your care today!     Please do not hesitate to contact our office with any follow-up questions or concerns. We can be contacted by phone or through your patient portal via the [employer] erika.    JONAS Yusuf  Office hours: Monday - Friday 8 am to 5 pm    [facility] [address], [address] [place] [id number]  Phone: [phone]  Fax: [phone]    For scheduling:  Imaging (CT, x-ray): [phone]  Pulmonary function tests: [phone]  Sleep studies: [phone]     4 = No assist / stand by assistance

## 2024-12-09 ENCOUNTER — NON-APPOINTMENT (OUTPATIENT)
Age: 79
End: 2024-12-09

## 2024-12-09 ENCOUNTER — APPOINTMENT (OUTPATIENT)
Dept: CARDIOLOGY | Facility: CLINIC | Age: 79
End: 2024-12-09
Payer: MEDICARE

## 2024-12-09 VITALS — DIASTOLIC BLOOD PRESSURE: 68 MMHG | SYSTOLIC BLOOD PRESSURE: 120 MMHG

## 2024-12-09 VITALS
RESPIRATION RATE: 15 BRPM | OXYGEN SATURATION: 98 % | DIASTOLIC BLOOD PRESSURE: 70 MMHG | WEIGHT: 192 LBS | SYSTOLIC BLOOD PRESSURE: 148 MMHG | BODY MASS INDEX: 35.33 KG/M2 | TEMPERATURE: 97.3 F | HEART RATE: 79 BPM | HEIGHT: 62 IN

## 2024-12-09 DIAGNOSIS — I10 ESSENTIAL (PRIMARY) HYPERTENSION: ICD-10-CM

## 2024-12-09 DIAGNOSIS — E78.00 PURE HYPERCHOLESTEROLEMIA, UNSPECIFIED: ICD-10-CM

## 2024-12-09 DIAGNOSIS — I25.10 ATHEROSCLEROTIC HEART DISEASE OF NATIVE CORONARY ARTERY W/OUT ANGINA PECTORIS: ICD-10-CM

## 2024-12-09 PROCEDURE — 99214 OFFICE O/P EST MOD 30 MIN: CPT | Mod: 25

## 2024-12-09 PROCEDURE — 93000 ELECTROCARDIOGRAM COMPLETE: CPT

## 2025-01-30 NOTE — DISCHARGE NOTE ADULT - FUNCTIONAL SCREEN CURRENT LEVEL: AMBULATION, MLM
(0) independent
[facility]  Hematology/Oncology  [address] [id number]  Phone: [phone]  Fax:   Follow Up Time: 1 month

## 2025-02-28 NOTE — REASON FOR VISIT
28-Feb-2025 18:49 [Hyperlipidemia] : hyperlipidemia [Hypertension] : hypertension [Other: ____] : [unfilled]

## 2025-04-07 ENCOUNTER — APPOINTMENT (OUTPATIENT)
Dept: CARDIOLOGY | Facility: CLINIC | Age: 80
End: 2025-04-07
Payer: MEDICARE

## 2025-04-07 ENCOUNTER — NON-APPOINTMENT (OUTPATIENT)
Age: 80
End: 2025-04-07

## 2025-04-07 VITALS
DIASTOLIC BLOOD PRESSURE: 77 MMHG | TEMPERATURE: 98.3 F | WEIGHT: 192 LBS | SYSTOLIC BLOOD PRESSURE: 148 MMHG | BODY MASS INDEX: 35.33 KG/M2 | OXYGEN SATURATION: 98 % | HEIGHT: 62 IN | HEART RATE: 77 BPM | RESPIRATION RATE: 15 BRPM

## 2025-04-07 VITALS — SYSTOLIC BLOOD PRESSURE: 130 MMHG | DIASTOLIC BLOOD PRESSURE: 78 MMHG

## 2025-04-07 DIAGNOSIS — I25.10 ATHEROSCLEROTIC HEART DISEASE OF NATIVE CORONARY ARTERY W/OUT ANGINA PECTORIS: ICD-10-CM

## 2025-04-07 DIAGNOSIS — E78.00 PURE HYPERCHOLESTEROLEMIA, UNSPECIFIED: ICD-10-CM

## 2025-04-07 DIAGNOSIS — I10 ESSENTIAL (PRIMARY) HYPERTENSION: ICD-10-CM

## 2025-04-07 PROCEDURE — 99214 OFFICE O/P EST MOD 30 MIN: CPT | Mod: 25

## 2025-04-07 PROCEDURE — 93000 ELECTROCARDIOGRAM COMPLETE: CPT

## 2025-05-21 ENCOUNTER — NON-APPOINTMENT (OUTPATIENT)
Age: 80
End: 2025-05-21

## 2025-05-21 ENCOUNTER — APPOINTMENT (OUTPATIENT)
Dept: CARDIOLOGY | Facility: CLINIC | Age: 80
End: 2025-05-21
Payer: MEDICARE

## 2025-05-21 VITALS
RESPIRATION RATE: 16 BRPM | HEIGHT: 62 IN | OXYGEN SATURATION: 99 % | DIASTOLIC BLOOD PRESSURE: 81 MMHG | TEMPERATURE: 97.7 F | SYSTOLIC BLOOD PRESSURE: 161 MMHG | BODY MASS INDEX: 34.96 KG/M2 | WEIGHT: 190 LBS | HEART RATE: 73 BPM

## 2025-05-21 VITALS — DIASTOLIC BLOOD PRESSURE: 71 MMHG | SYSTOLIC BLOOD PRESSURE: 118 MMHG

## 2025-05-21 DIAGNOSIS — I10 ESSENTIAL (PRIMARY) HYPERTENSION: ICD-10-CM

## 2025-05-21 DIAGNOSIS — E78.00 PURE HYPERCHOLESTEROLEMIA, UNSPECIFIED: ICD-10-CM

## 2025-05-21 DIAGNOSIS — Z01.810 ENCOUNTER FOR PREPROCEDURAL CARDIOVASCULAR EXAMINATION: ICD-10-CM

## 2025-05-21 DIAGNOSIS — I25.10 ATHEROSCLEROTIC HEART DISEASE OF NATIVE CORONARY ARTERY W/OUT ANGINA PECTORIS: ICD-10-CM

## 2025-05-21 PROCEDURE — 93000 ELECTROCARDIOGRAM COMPLETE: CPT | Mod: NC

## 2025-05-21 PROCEDURE — 99214 OFFICE O/P EST MOD 30 MIN: CPT | Mod: 25

## 2025-07-10 ENCOUNTER — EMERGENCY (EMERGENCY)
Facility: HOSPITAL | Age: 80
LOS: 1 days | End: 2025-07-10
Attending: EMERGENCY MEDICINE | Admitting: EMERGENCY MEDICINE
Payer: MEDICARE

## 2025-07-10 VITALS
OXYGEN SATURATION: 100 % | SYSTOLIC BLOOD PRESSURE: 146 MMHG | HEART RATE: 78 BPM | RESPIRATION RATE: 16 BRPM | DIASTOLIC BLOOD PRESSURE: 78 MMHG | TEMPERATURE: 98 F | HEIGHT: 59 IN | WEIGHT: 190.04 LBS

## 2025-07-10 VITALS
HEART RATE: 60 BPM | TEMPERATURE: 98 F | DIASTOLIC BLOOD PRESSURE: 68 MMHG | SYSTOLIC BLOOD PRESSURE: 132 MMHG | OXYGEN SATURATION: 100 % | RESPIRATION RATE: 16 BRPM

## 2025-07-10 DIAGNOSIS — Z90.710 ACQUIRED ABSENCE OF BOTH CERVIX AND UTERUS: Chronic | ICD-10-CM

## 2025-07-10 DIAGNOSIS — Z98.890 OTHER SPECIFIED POSTPROCEDURAL STATES: Chronic | ICD-10-CM

## 2025-07-10 LAB
ALBUMIN SERPL ELPH-MCNC: 3.6 G/DL — SIGNIFICANT CHANGE UP (ref 3.3–5)
ALBUMIN SERPL ELPH-MCNC: 3.9 G/DL — SIGNIFICANT CHANGE UP (ref 3.3–5)
ALP SERPL-CCNC: 63 U/L — SIGNIFICANT CHANGE UP (ref 40–120)
ALP SERPL-CCNC: 72 U/L — SIGNIFICANT CHANGE UP (ref 40–120)
ALT FLD-CCNC: 16 U/L — SIGNIFICANT CHANGE UP (ref 4–33)
ALT FLD-CCNC: 9 U/L — SIGNIFICANT CHANGE UP (ref 4–33)
ANION GAP SERPL CALC-SCNC: 10 MMOL/L — SIGNIFICANT CHANGE UP (ref 7–14)
ANION GAP SERPL CALC-SCNC: 12 MMOL/L — SIGNIFICANT CHANGE UP (ref 7–14)
AST SERPL-CCNC: 13 U/L — SIGNIFICANT CHANGE UP (ref 4–32)
AST SERPL-CCNC: 43 U/L — HIGH (ref 4–32)
BASOPHILS # BLD AUTO: 0.03 K/UL — SIGNIFICANT CHANGE UP (ref 0–0.2)
BASOPHILS NFR BLD AUTO: 0.3 % — SIGNIFICANT CHANGE UP (ref 0–2)
BILIRUB SERPL-MCNC: 0.2 MG/DL — SIGNIFICANT CHANGE UP (ref 0.2–1.2)
BILIRUB SERPL-MCNC: 0.4 MG/DL — SIGNIFICANT CHANGE UP (ref 0.2–1.2)
BUN SERPL-MCNC: 17 MG/DL — SIGNIFICANT CHANGE UP (ref 7–23)
BUN SERPL-MCNC: 17 MG/DL — SIGNIFICANT CHANGE UP (ref 7–23)
CALCIUM SERPL-MCNC: 9.4 MG/DL — SIGNIFICANT CHANGE UP (ref 8.4–10.5)
CALCIUM SERPL-MCNC: 9.8 MG/DL — SIGNIFICANT CHANGE UP (ref 8.4–10.5)
CHLORIDE SERPL-SCNC: 103 MMOL/L — SIGNIFICANT CHANGE UP (ref 98–107)
CHLORIDE SERPL-SCNC: 103 MMOL/L — SIGNIFICANT CHANGE UP (ref 98–107)
CO2 SERPL-SCNC: 24 MMOL/L — SIGNIFICANT CHANGE UP (ref 22–31)
CO2 SERPL-SCNC: 26 MMOL/L — SIGNIFICANT CHANGE UP (ref 22–31)
CREAT SERPL-MCNC: 1.22 MG/DL — SIGNIFICANT CHANGE UP (ref 0.5–1.3)
CREAT SERPL-MCNC: 1.3 MG/DL — SIGNIFICANT CHANGE UP (ref 0.5–1.3)
EGFR: 41 ML/MIN/1.73M2 — LOW
EGFR: 41 ML/MIN/1.73M2 — LOW
EGFR: 44 ML/MIN/1.73M2 — LOW
EGFR: 44 ML/MIN/1.73M2 — LOW
EOSINOPHIL # BLD AUTO: 0.14 K/UL — SIGNIFICANT CHANGE UP (ref 0–0.5)
EOSINOPHIL NFR BLD AUTO: 1.3 % — SIGNIFICANT CHANGE UP (ref 0–6)
GLUCOSE SERPL-MCNC: 59 MG/DL — LOW (ref 70–99)
GLUCOSE SERPL-MCNC: 76 MG/DL — SIGNIFICANT CHANGE UP (ref 70–99)
HCT VFR BLD CALC: 34.2 % — LOW (ref 34.5–45)
HGB BLD-MCNC: 11.2 G/DL — LOW (ref 11.5–15.5)
IMM GRANULOCYTES # BLD AUTO: 0.05 K/UL — SIGNIFICANT CHANGE UP (ref 0–0.07)
IMM GRANULOCYTES NFR BLD AUTO: 0.5 % — SIGNIFICANT CHANGE UP (ref 0–0.9)
LYMPHOCYTES # BLD AUTO: 3.22 K/UL — SIGNIFICANT CHANGE UP (ref 1–3.3)
LYMPHOCYTES NFR BLD AUTO: 29.8 % — SIGNIFICANT CHANGE UP (ref 13–44)
MCHC RBC-ENTMCNC: 30.4 PG — SIGNIFICANT CHANGE UP (ref 27–34)
MCHC RBC-ENTMCNC: 32.7 G/DL — SIGNIFICANT CHANGE UP (ref 32–36)
MCV RBC AUTO: 92.7 FL — SIGNIFICANT CHANGE UP (ref 80–100)
MONOCYTES # BLD AUTO: 1.02 K/UL — HIGH (ref 0–0.9)
MONOCYTES NFR BLD AUTO: 9.4 % — SIGNIFICANT CHANGE UP (ref 2–14)
NEUTROPHILS # BLD AUTO: 6.34 K/UL — SIGNIFICANT CHANGE UP (ref 1.8–7.4)
NEUTROPHILS NFR BLD AUTO: 58.7 % — SIGNIFICANT CHANGE UP (ref 43–77)
NRBC # BLD AUTO: 0 K/UL — SIGNIFICANT CHANGE UP (ref 0–0)
NRBC # FLD: 0 K/UL — SIGNIFICANT CHANGE UP (ref 0–0)
NRBC BLD AUTO-RTO: 0 /100 WBCS — SIGNIFICANT CHANGE UP (ref 0–0)
PLATELET # BLD AUTO: 224 K/UL — SIGNIFICANT CHANGE UP (ref 150–400)
PMV BLD: 10.7 FL — SIGNIFICANT CHANGE UP (ref 7–13)
POTASSIUM SERPL-MCNC: 3.7 MMOL/L — SIGNIFICANT CHANGE UP (ref 3.5–5.3)
POTASSIUM SERPL-MCNC: 5.7 MMOL/L — HIGH (ref 3.5–5.3)
POTASSIUM SERPL-SCNC: 3.7 MMOL/L — SIGNIFICANT CHANGE UP (ref 3.5–5.3)
POTASSIUM SERPL-SCNC: 5.7 MMOL/L — HIGH (ref 3.5–5.3)
PROT SERPL-MCNC: 6.3 G/DL — SIGNIFICANT CHANGE UP (ref 6–8.3)
PROT SERPL-MCNC: 7.7 G/DL — SIGNIFICANT CHANGE UP (ref 6–8.3)
RBC # BLD: 3.69 M/UL — LOW (ref 3.8–5.2)
RBC # FLD: 13.3 % — SIGNIFICANT CHANGE UP (ref 10.3–14.5)
SODIUM SERPL-SCNC: 139 MMOL/L — SIGNIFICANT CHANGE UP (ref 135–145)
SODIUM SERPL-SCNC: 139 MMOL/L — SIGNIFICANT CHANGE UP (ref 135–145)
WBC # BLD: 10.8 K/UL — HIGH (ref 3.8–10.5)
WBC # FLD AUTO: 10.8 K/UL — HIGH (ref 3.8–10.5)

## 2025-07-10 PROCEDURE — 70498 CT ANGIOGRAPHY NECK: CPT | Mod: 26

## 2025-07-10 PROCEDURE — 70496 CT ANGIOGRAPHY HEAD: CPT | Mod: 26

## 2025-07-10 PROCEDURE — 93971 EXTREMITY STUDY: CPT | Mod: 26,LT

## 2025-07-10 PROCEDURE — 99285 EMERGENCY DEPT VISIT HI MDM: CPT

## 2025-07-10 PROCEDURE — 93010 ELECTROCARDIOGRAM REPORT: CPT

## 2025-07-10 RX ORDER — LIDOCAINE HYDROCHLORIDE 20 MG/ML
1 JELLY TOPICAL ONCE
Refills: 0 | Status: COMPLETED | OUTPATIENT
Start: 2025-07-10 | End: 2025-07-10

## 2025-07-10 RX ORDER — DIAZEPAM 5 MG/1
2 TABLET ORAL ONCE
Refills: 0 | Status: DISCONTINUED | OUTPATIENT
Start: 2025-07-10 | End: 2025-07-10

## 2025-07-10 RX ORDER — KETOROLAC TROMETHAMINE 30 MG/ML
15 INJECTION, SOLUTION INTRAMUSCULAR; INTRAVENOUS ONCE
Refills: 0 | Status: DISCONTINUED | OUTPATIENT
Start: 2025-07-10 | End: 2025-07-10

## 2025-07-10 RX ORDER — ACETAMINOPHEN 500 MG/5ML
1000 LIQUID (ML) ORAL ONCE
Refills: 0 | Status: COMPLETED | OUTPATIENT
Start: 2025-07-10 | End: 2025-07-10

## 2025-07-10 RX ADMIN — DIAZEPAM 2 MILLIGRAM(S): 5 TABLET ORAL at 09:53

## 2025-07-10 RX ADMIN — Medication 400 MILLIGRAM(S): at 09:53

## 2025-07-10 RX ADMIN — LIDOCAINE HYDROCHLORIDE 1 PATCH: 20 JELLY TOPICAL at 14:17

## 2025-07-10 RX ADMIN — Medication 1000 MILLIGRAM(S): at 10:53

## 2025-07-10 RX ADMIN — KETOROLAC TROMETHAMINE 15 MILLIGRAM(S): 30 INJECTION, SOLUTION INTRAMUSCULAR; INTRAVENOUS at 12:41

## 2025-08-18 ENCOUNTER — APPOINTMENT (OUTPATIENT)
Dept: CARDIOLOGY | Facility: CLINIC | Age: 80
End: 2025-08-18
Payer: MEDICARE

## 2025-08-18 VITALS
RESPIRATION RATE: 16 BRPM | BODY MASS INDEX: 35.7 KG/M2 | SYSTOLIC BLOOD PRESSURE: 145 MMHG | HEIGHT: 62 IN | OXYGEN SATURATION: 99 % | DIASTOLIC BLOOD PRESSURE: 69 MMHG | HEART RATE: 78 BPM | WEIGHT: 194 LBS | TEMPERATURE: 96.9 F

## 2025-08-18 VITALS — SYSTOLIC BLOOD PRESSURE: 108 MMHG | DIASTOLIC BLOOD PRESSURE: 64 MMHG

## 2025-08-18 DIAGNOSIS — I10 ESSENTIAL (PRIMARY) HYPERTENSION: ICD-10-CM

## 2025-08-18 DIAGNOSIS — E78.00 PURE HYPERCHOLESTEROLEMIA, UNSPECIFIED: ICD-10-CM

## 2025-08-18 DIAGNOSIS — I25.10 ATHEROSCLEROTIC HEART DISEASE OF NATIVE CORONARY ARTERY W/OUT ANGINA PECTORIS: ICD-10-CM

## 2025-08-18 PROCEDURE — 99214 OFFICE O/P EST MOD 30 MIN: CPT | Mod: 25

## 2025-08-18 PROCEDURE — 93000 ELECTROCARDIOGRAM COMPLETE: CPT
